# Patient Record
Sex: MALE | Race: WHITE
[De-identification: names, ages, dates, MRNs, and addresses within clinical notes are randomized per-mention and may not be internally consistent; named-entity substitution may affect disease eponyms.]

---
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
## 2023-01-13 ENCOUNTER — HOSPITAL ENCOUNTER (EMERGENCY)
Age: 18
Discharge: LEFT BEFORE DISCHARGE | End: 2023-01-13

## 2023-01-13 VITALS
SYSTOLIC BLOOD PRESSURE: 130 MMHG | RESPIRATION RATE: 16 BRPM | TEMPERATURE: 98.3 F | HEART RATE: 83 BPM | OXYGEN SATURATION: 94 % | DIASTOLIC BLOOD PRESSURE: 82 MMHG

## 2023-01-17 ENCOUNTER — HOSPITAL ENCOUNTER (EMERGENCY)
Age: 18
Discharge: LEFT BEFORE DISCHARGE | End: 2023-01-17
Payer: COMMERCIAL

## 2023-01-17 VITALS
RESPIRATION RATE: 16 BRPM | OXYGEN SATURATION: 98 % | HEIGHT: 68 IN | SYSTOLIC BLOOD PRESSURE: 124 MMHG | TEMPERATURE: 98.6 F | DIASTOLIC BLOOD PRESSURE: 80 MMHG | WEIGHT: 175 LBS | HEART RATE: 90 BPM | BODY MASS INDEX: 26.52 KG/M2

## 2023-01-17 DIAGNOSIS — Z53.21 ELOPED FROM EMERGENCY DEPARTMENT: Primary | ICD-10-CM

## 2023-01-17 PROCEDURE — 99281 EMR DPT VST MAYX REQ PHY/QHP: CPT

## 2023-01-17 ASSESSMENT — PAIN - FUNCTIONAL ASSESSMENT: PAIN_FUNCTIONAL_ASSESSMENT: NONE - DENIES PAIN

## 2023-01-17 NOTE — ED NOTES
Department of Emergency Medicine  FIRST PROVIDER ELOPEMENT NOTE                 [facility]          1/17/23  6:44 PM EST    MRN: [id number]    HPI: Cullen Julian is a 21 y.o. male who presents to the ED with the following complaint: Dizziness (Has not been eating (last intake 2 days ago))      (Please refer to [address] [place] [address] for pertinent ROS and PE documentation)  Labs:  No results found for this visit on 01/17/23. Imaging: All Radiology results interpreted by Radiologist unless otherwise noted. No orders to display     ED Course    Medications - No data to display     -------------------------  Disposition    Patient ELOPED from the department prior to completion of evaluation after triage. Results of triage orders that were completed at time of elopement as indicated above were reviewed.     Diagnosis at Time of Elopement: (Based on presenting complaint/available test results):   Eloped from the ED    Electronically signed by NAN Bran CNP   DD: 1/17/23      NAN Bran CNP  01/17/23 2443

## 2023-01-17 NOTE — ED PROVIDER NOTES
Department of Emergency Medicine  FIRST PROVIDER TRIAGE NOTE             Independent MLP           1/17/23  6:36 PM EST    Date of Encounter: 1/17/23   MRN: [id number]      HPI: Veronica Ferrara is a 21 y.o. male who presents to the ED for Dizziness (Has not been eating (last intake 2 days ago))   Patient reports he lives with his brother and took the bus here and does not have food in the home and has not eaten for 3 days and is dizzy and lightheaded. Patients grandmother and aunt brought him chicken nuggets, ff's and orange drink while sitting in triage and his finger stick blood sugar was 273 and states he is not diabetic and denies any excessive thirst, excessive urinating. He reports he was only drinking water and did not eat before the blood sugar was obtained. He denies any headache or head trauma. No neurologic or sensory deficits on examination and is no longer dizzy and smiling and eating out of a [employer] bag. ROS: Negative for cp, sob, abd pain, back pain, fever, cough, vomiting, diarrhea, urinary complaints, rash, headache, Suicidal ideation, Homicidal Ideation, bloody or tarry stools. PE: Gen Appearance/Constitutional: alert  HEENT: NC/NT. PERRLA,  Airway patent. Neck: supple  CV: regular rate  Pulm: CTA bilat  GI: soft and NT  Musculoskeletal: moves all extremities x 4  Lymphatics: no edema     Initial Plan of Care: All treatment areas with department are currently occupied. Plan to order/Initiate the following while awaiting opening in ED: labs and EKG to make sure the patient is not DM.   Initiate Treatment-Testing, Proceed toTreatment Area When Bed Available for ED Attending/MLP to Continue Care    Electronically signed by NAN Vogel CNP   DD: 1/17/23      NAN Vogel CNP  01/17/23 [address], APRN - CNP  01/17/23 2370

## 2023-01-22 ENCOUNTER — HOSPITAL ENCOUNTER (EMERGENCY)
Age: 18
Discharge: HOME | DRG: 753 | End: 2023-01-22
Attending: EMERGENCY MEDICINE
Payer: COMMERCIAL

## 2023-01-22 ENCOUNTER — APPOINTMENT (OUTPATIENT)
Dept: GENERAL RADIOLOGY | Age: 18
DRG: 753 | End: 2023-01-22
Payer: COMMERCIAL

## 2023-01-22 VITALS
RESPIRATION RATE: 16 BRPM | TEMPERATURE: 97.4 F | OXYGEN SATURATION: 98 % | DIASTOLIC BLOOD PRESSURE: 62 MMHG | RESPIRATION RATE: 16 BRPM | SYSTOLIC BLOOD PRESSURE: 125 MMHG | OXYGEN SATURATION: 98 % | DIASTOLIC BLOOD PRESSURE: 62 MMHG | SYSTOLIC BLOOD PRESSURE: 125 MMHG | TEMPERATURE: 97.4 F | HEART RATE: 98 BPM | HEART RATE: 98 BPM

## 2023-01-22 DIAGNOSIS — S93.401A SPRAIN OF RIGHT ANKLE, UNSPECIFIED LIGAMENT, INITIAL ENCOUNTER: Primary | ICD-10-CM

## 2023-01-22 PROCEDURE — 73610 X-RAY EXAM OF ANKLE: CPT

## 2023-01-22 PROCEDURE — 99283 EMERGENCY DEPT VISIT LOW MDM: CPT

## 2023-01-22 NOTE — ED PROVIDER NOTES
Shared CHRISTOPHER-ED Attending Visit. CC: [address]  Department of Emergency Medicine   ED  Encounter Note  Admit Date/RoomTime: 2023  3:07 PM  ED Room: [place] [address]    NAME: Misty Guzmán  : 2005  MRN: [id number]     Chief Complaint:  Ankle Pain (Slipped and fill on ice , complain of right ankle pain )    History of Present Illness        Misty Guzmán is a 21 y.o. old male presenting to the emergency department by private vehicle, for traumatic Right ankle pain which occured today, prior to arrival.  The complaint is due to slipping on ice. Since onset the symptoms have been remaining constant with ability to bear weight, but with some pain. Patient has no prior history of pain/injury with regards to today's visit. His pain is aggraveated by any use of and relieved by nothing, as no treatment has been provided prior to this visit. Patient stated that he slipped on ice. Patient states that he did not fall, just slipped and hurt his right ankle. ROS   Pertinent positives and negatives are stated within HPI, all other systems reviewed and are negative. Past Medical History:  has no past medical history on file. Surgical History:  has no past surgical history on file. Social History:      Family History: family history is not on file. Allergies: Patient has no known allergies. Physical Exam   Oxygen Saturation Interpretation: Normal.        ED Triage Vitals [23 1502]   BP Temp Temp src Heart Rate Resp SpO2 Height Weight   125/62 97.4 °F (36.3 °C) -- 98 16 98 % -- --       Physical Exam  Constitutional:  Alert, development consistent with age. Neck:  Normal ROM. Supple. Right Ankle: diffusely across ankle              Tenderness:  mild. Swelling: Mild. Deformity: no deformity observed/palpated. ROM: full range with pain. Skin:  no wounds, erythema, or swelling.        Neurovascular: Motor deficit: none. Sensory deficit:   none. Pulse deficit: none. Capillary refill: normal.  Right Knee:              Tenderness:  none. Swelling: none. Deformity: no deformity observed/palpated. ROM: full range of motion. Skin:  no wounds, erythema, or swelling. Right Foot: diffusely across entire foot              Tenderness:  none. Swelling: None. Deformity: no deformity observed/palpated. ROM: full range of motion. Skin:  no wounds, erythema, or swelling  Gait:  limp due to affected limb. Lymphatics: No lymphangitis or adenopathy noted. Neurological:  Oriented. Motor functions intact. Lab / Imaging Results   (All laboratory and radiology results have been personally reviewed by myself)  Labs:  No results found for this visit on 01/22/23. Imaging: All Radiology results interpreted by Radiologist unless otherwise noted. XR ANKLE RIGHT (MIN 3 VIEWS)   Final Result   Small well corticated bony fragment at the tip of the medial malleolus likely   a prior avulsion injury. No acute fractures. Soft tissue swelling laterally. ED Course / Medical Decision Making   Medications - No data to display     MDM:       Patient presents to the emergency department by private vehicle, for traumatic Right ankle pain which occured today, prior to arrival.  The complaint is due to slipping on ice. Since onset the symptoms have been remaining constant with ability to bear weight, but with some pain. Patient has no prior history of pain/injury with regards to today's visit. His pain is aggraveated by any use of and relieved by nothing, as no treatment has been provided prior to this visit. Patient stated that he slipped on ice. Patient states that he did not fall, just slipped and hurt his right ankle. Right ankle has diffuse tenderness noted with mild swelling. Full ROM with pain. No wounds, erythema, or deformity observed/palpated. Right foot is neurovascularly intact with no motor deficit, no sensory deficit, no pulse deficit, and normal capillary refill. All compartments soft and compressible. No proximal tibular tenderness to palpation. No tenderness, swelling or deformity observed/palpated to right knee. Patient ordered X-ray of right ankle that shows no acute fracture. Ace wrap applied to right ankle. Patient given number for [facility] so he can establish a PCP for follow up care. Patient verbalized understanding of discharge instructions and has no questions at this time. Plan of Care/Counseling:  NAN Hudson CNP reviewed today's visit with the patient in addition to providing specific details for the plan of care and counseling regarding the diagnosis and prognosis. Questions are answered at this time and are agreeable with the plan. Assessment      1. Sprain of right ankle, unspecified ligament, initial encounter      Plan   Discharged home. Patient condition is stable    New Medications     There are no discharge medications for this patient. Electronically signed by NAN Hudson CNP   DD: 1/22/23  **This report was transcribed using voice recognition software. Every effort was made to ensure accuracy; however, inadvertent computerized transcription errors may be present. END OF ED PROVIDER NOTE       NAN Hudson CNP  01/22/23 9189    ATTENDING PROVIDER ATTESTATION:     Calin Barba presented to the emergency department for evaluation of Ankle Pain (Slipped and fill on ice , complain of right ankle pain )    I have reviewed and discussed the case, including pertinent history (medical, surgical, family and social) and exam findings with the Midlevel and the Nurse assigned to Calin Barba.   I have personally performed and/or participated in a substantive portion of the history, exam, medical decision making, and procedures and agree with all pertinent clinical information. I personally saw the patient and performed a substantive portion of the visit including all aspects of the medical decision making    I, Dr. Maddy Valentin am the primary physician of record. Celestina Sneed is a 21 y.o. male who presents to the ED for ankle pain  Vital signs upon arrival /62   Pulse 98   Temp 97.4 °F (36.3 °C)   Resp 16   SpO2 98%     History From: The patient as well as the patient's medical record. Independent historian: None    Limitations to history: None    History: Patient is presenting to the emergency department with a chief complaint of right ankle pain. The patient states that he slipped on ice. He has had pain located diffusely in the right ankle. The patient's pain is moderate in severity. Worse with bearing weight. Nothing makes it better. No treatment prior to arrival.  He did not strike his head. He denies any numbness, weakness or paresthesias. Physical exam: Patient is sitting in the bed in no acute distress. The patient has no fibular head tenderness. He does have minimal base of fifth metatarsal tenderness. 2+ dorsalis pedis and posterior tibial pulse present. There is tenderness to palpation in the medial as well as lateral malleolus. All compartments of the right lower extremity are soft. Differential diagnosis includes but not limited to:  Fracture-not seen on imaging  Dislocation-not seen on imaging  Sprain-we will treat as sprain will give Ace wrap and crutches. Diagnostic Considerations : Consider CT of the ankle for further evaluation for occult fracture. At this time not necessary as patient's pain is well controlled. To consider x-ray of the foot as patient did have mild tenderness to palpation over the base of the fifth metatarsal.  Patient adamantly declined foot x-ray.     Patient treated with   Ace wrap      Imaging reviewed and interpreted by me demonstrates:  No acute fracture or dislocation. Discussions with other health care professionals:   Spoke with Nevaeh Flower NP who did see the patient as well. The patient will be discharged home with Ace wrap and crutches. Risk/Disposition: Mild to moderate-patient presents emergency department after ankle pain. The patient did have pain in his ankle. The patient had no fracture dislocation seen. He will be treated with Ace wrap and crutches. Pt will be d/c and will follow up with his PCP . He is educated on signs and symptoms that require emergent evaluation. Pt is advised to return to the ED if his symptoms change or worsen. If his pain persists, pt may need further evaluation. Pt is agreeable to plan and all questions have been answered at this time. PATIENT REFERRED TO:  [facility]  [phone]  Schedule an appointment as soon as possible for a visit   to schedule a follow-up appt. for re-evaluation      DISCHARGE MEDICATIONS:  There are no discharge medications for this patient. I have reviewed my findings and recommendations with Valeria Washington and members of family present at the time of disposition. My findings/plan: The encounter diagnosis was Sprain of right ankle, unspecified ligament, initial encounter. There are no discharge medications for this patient.     Meena Sr, [address],   01/24/23 0922

## 2023-01-22 NOTE — ED NOTES
Ace wrap applied to right ankle and crutches given.      [facility]  01/22/23 3101

## 2023-01-23 ENCOUNTER — HOSPITAL ENCOUNTER (INPATIENT)
Age: 18
LOS: 3 days | Discharge: HOME | DRG: 753 | End: 2023-01-27
Attending: EMERGENCY MEDICINE | Admitting: PSYCHIATRY & NEUROLOGY
Payer: COMMERCIAL

## 2023-01-23 DIAGNOSIS — F39 MOOD DISORDER (HCC): Primary | ICD-10-CM

## 2023-01-23 LAB
ACETAMINOPHEN LEVEL: <5 MCG/ML (ref 10–30)
ALBUMIN SERPL-MCNC: 4.2 G/DL (ref 3.5–5.2)
ALP BLD-CCNC: 84 U/L (ref 40–129)
ALT SERPL-CCNC: 11 U/L (ref 0–40)
AMPHETAMINE SCREEN, URINE: NOT DETECTED
ANION GAP SERPL CALCULATED.3IONS-SCNC: 10 MMOL/L (ref 7–16)
AST SERPL-CCNC: 16 U/L (ref 0–39)
BARBITURATE SCREEN URINE: NOT DETECTED
BASOPHILS ABSOLUTE: 0.05 E9/L (ref 0–0.2)
BASOPHILS RELATIVE PERCENT: 0.5 % (ref 0–2)
BENZODIAZEPINE SCREEN, URINE: NOT DETECTED
BILIRUB SERPL-MCNC: 0.3 MG/DL (ref 0–1.2)
BUN BLDV-MCNC: 12 MG/DL (ref 6–20)
CALCIUM SERPL-MCNC: 9.6 MG/DL (ref 8.6–10.2)
CANNABINOID SCREEN URINE: NOT DETECTED
CHLORIDE BLD-SCNC: 102 MMOL/L (ref 98–107)
CO2: 28 MMOL/L (ref 22–29)
COCAINE METABOLITE SCREEN URINE: NOT DETECTED
CREAT SERPL-MCNC: 0.9 MG/DL (ref 0.7–1.2)
EOSINOPHILS ABSOLUTE: 0.45 E9/L (ref 0.05–0.5)
EOSINOPHILS RELATIVE PERCENT: 4.9 % (ref 0–6)
ETHANOL: <10 MG/DL (ref 0–0.08)
FENTANYL SCREEN, URINE: NOT DETECTED
GFR SERPL CREATININE-BSD FRML MDRD: >60 ML/MIN/1.73
GLUCOSE BLD-MCNC: 115 MG/DL (ref 74–99)
HCT VFR BLD CALC: 47.4 % (ref 37–54)
HEMOGLOBIN: 16.3 G/DL (ref 12.5–16.5)
IMMATURE GRANULOCYTES #: 0.04 E9/L
IMMATURE GRANULOCYTES %: 0.4 % (ref 0–5)
LYMPHOCYTES ABSOLUTE: 2.98 E9/L (ref 1.5–4)
LYMPHOCYTES RELATIVE PERCENT: 32.5 % (ref 20–42)
Lab: NORMAL
MCH RBC QN AUTO: 29 PG (ref 26–35)
MCHC RBC AUTO-ENTMCNC: 34.4 % (ref 32–34.5)
MCV RBC AUTO: 84.3 FL (ref 80–99.9)
METHADONE SCREEN, URINE: NOT DETECTED
MONOCYTES ABSOLUTE: 0.61 E9/L (ref 0.1–0.95)
MONOCYTES RELATIVE PERCENT: 6.7 % (ref 2–12)
NEUTROPHILS ABSOLUTE: 5.03 E9/L (ref 1.8–7.3)
NEUTROPHILS RELATIVE PERCENT: 55 % (ref 43–80)
OPIATE SCREEN URINE: NOT DETECTED
OXYCODONE URINE: NOT DETECTED
PDW BLD-RTO: 12.4 FL (ref 11.5–15)
PHENCYCLIDINE SCREEN URINE: NOT DETECTED
PLATELET # BLD: 288 E9/L (ref 130–450)
PMV BLD AUTO: 8.9 FL (ref 7–12)
POTASSIUM SERPL-SCNC: 3.6 MMOL/L (ref 3.5–5)
RBC # BLD: 5.62 E12/L (ref 3.8–5.8)
SALICYLATE, SERUM: <0.3 MG/DL (ref 0–30)
SODIUM BLD-SCNC: 140 MMOL/L (ref 132–146)
TOTAL PROTEIN: 7.1 G/DL (ref 6.4–8.3)
TRICYCLIC ANTIDEPRESSANTS SCREEN SERUM: NEGATIVE NG/ML
VALPROIC ACID LEVEL: 3 MCG/ML (ref 50–100)
WBC # BLD: 9.2 E9/L (ref 4.5–11.5)

## 2023-01-23 PROCEDURE — 80307 DRUG TEST PRSMV CHEM ANLYZR: CPT

## 2023-01-23 PROCEDURE — 80053 COMPREHEN METABOLIC PANEL: CPT

## 2023-01-23 PROCEDURE — 85025 COMPLETE CBC W/AUTO DIFF WBC: CPT

## 2023-01-23 PROCEDURE — 80164 ASSAY DIPROPYLACETIC ACD TOT: CPT

## 2023-01-23 PROCEDURE — 80143 DRUG ASSAY ACETAMINOPHEN: CPT

## 2023-01-23 PROCEDURE — 82077 ASSAY SPEC XCP UR&BREATH IA: CPT

## 2023-01-23 PROCEDURE — 99285 EMERGENCY DEPT VISIT HI MDM: CPT

## 2023-01-23 PROCEDURE — 80179 DRUG ASSAY SALICYLATE: CPT

## 2023-01-23 ASSESSMENT — PAIN - FUNCTIONAL ASSESSMENT: PAIN_FUNCTIONAL_ASSESSMENT: NONE - DENIES PAIN

## 2023-01-23 NOTE — LETTER
[address] [id number]  Phone: [phone]          January 27, 2023     Patient: Orlando Zurita   YOB: 2005   Date of Visit: 1/23/2023       To Whom It May Concern:    Please be advised that Orlando Zurita was admitted to [address] [place] [address] in [facility] on 1/23/2023 and discharged on 1/27/2023. If you have any questions or concerns, please don't hesitate to call. Sincerely,    Chantelle Greer MSW, LSW.

## 2023-01-24 PROBLEM — F31.60 BIPOLAR AFFECTIVE, MIXED (HCC): Status: RESOLVED | Noted: 2022-08-20 | Resolved: 2023-01-24

## 2023-01-24 PROBLEM — F31.60 BIPOLAR 1 DISORDER, MIXED (HCC): Status: ACTIVE | Noted: 2023-01-24

## 2023-01-24 LAB
EKG ATRIAL RATE: 57 BPM
EKG P AXIS: 39 DEGREES
EKG P-R INTERVAL: 130 MS
EKG Q-T INTERVAL: 394 MS
EKG QRS DURATION: 82 MS
EKG QTC CALCULATION (BAZETT): 383 MS
EKG R AXIS: 52 DEGREES
EKG T AXIS: 52 DEGREES
EKG VENTRICULAR RATE: 57 BPM
INFLUENZA A: NOT DETECTED
INFLUENZA B: NOT DETECTED
SARS-COV-2 RNA, RT PCR: NOT DETECTED

## 2023-01-24 PROCEDURE — 6370000000 HC RX 637 (ALT 250 FOR IP): Performed by: PSYCHIATRY & NEUROLOGY

## 2023-01-24 PROCEDURE — 93010 ELECTROCARDIOGRAM REPORT: CPT | Performed by: INTERNAL MEDICINE

## 2023-01-24 PROCEDURE — 87636 SARSCOV2 & INF A&B AMP PRB: CPT

## 2023-01-24 PROCEDURE — 93005 ELECTROCARDIOGRAM TRACING: CPT | Performed by: EMERGENCY MEDICINE

## 2023-01-24 PROCEDURE — 6370000000 HC RX 637 (ALT 250 FOR IP): Performed by: NURSE PRACTITIONER

## 2023-01-24 PROCEDURE — 90792 PSYCH DIAG EVAL W/MED SRVCS: CPT | Performed by: NURSE PRACTITIONER

## 2023-01-24 PROCEDURE — 1240000000 HC EMOTIONAL WELLNESS R&B

## 2023-01-24 RX ORDER — MAGNESIUM HYDROXIDE/ALUMINUM HYDROXICE/SIMETHICONE 120; 1200; 1200 MG/30ML; MG/30ML; MG/30ML
30 SUSPENSION ORAL PRN
Status: DISCONTINUED | OUTPATIENT
Start: 2023-01-24 | End: 2023-01-27 | Stop reason: HOSPADM

## 2023-01-24 RX ORDER — LANOLIN ALCOHOL/MO/W.PET/CERES
3 CREAM (GRAM) TOPICAL NIGHTLY
Status: DISCONTINUED | OUTPATIENT
Start: 2023-01-24 | End: 2023-01-27 | Stop reason: HOSPADM

## 2023-01-24 RX ORDER — NICOTINE 21 MG/24HR
1 PATCH, TRANSDERMAL 24 HOURS TRANSDERMAL DAILY
Status: DISCONTINUED | OUTPATIENT
Start: 2023-01-24 | End: 2023-01-24

## 2023-01-24 RX ORDER — ACETAMINOPHEN 325 MG/1
650 TABLET ORAL EVERY 6 HOURS PRN
Status: DISCONTINUED | OUTPATIENT
Start: 2023-01-24 | End: 2023-01-27 | Stop reason: HOSPADM

## 2023-01-24 RX ORDER — HALOPERIDOL 5 MG/1
5 TABLET ORAL EVERY 6 HOURS PRN
Status: DISCONTINUED | OUTPATIENT
Start: 2023-01-24 | End: 2023-01-27 | Stop reason: HOSPADM

## 2023-01-24 RX ORDER — DIVALPROEX SODIUM 500 MG/1
1000 TABLET, EXTENDED RELEASE ORAL DAILY
Status: DISCONTINUED | OUTPATIENT
Start: 2023-01-24 | End: 2023-01-27 | Stop reason: HOSPADM

## 2023-01-24 RX ORDER — DIVALPROEX SODIUM 500 MG/1
1000 TABLET, EXTENDED RELEASE ORAL DAILY
COMMUNITY
End: 2023-03-09

## 2023-01-24 RX ORDER — HYDROXYZINE PAMOATE 25 MG/1
50 CAPSULE ORAL 3 TIMES DAILY PRN
Status: DISCONTINUED | OUTPATIENT
Start: 2023-01-24 | End: 2023-01-27 | Stop reason: HOSPADM

## 2023-01-24 RX ORDER — HALOPERIDOL 5 MG/ML
5 INJECTION INTRAMUSCULAR EVERY 6 HOURS PRN
Status: DISCONTINUED | OUTPATIENT
Start: 2023-01-24 | End: 2023-01-27 | Stop reason: HOSPADM

## 2023-01-24 RX ADMIN — DIVALPROEX SODIUM 1000 MG: 500 TABLET, EXTENDED RELEASE ORAL at 15:42

## 2023-01-24 RX ADMIN — NICOTINE POLACRILEX 4 MG: 2 GUM, CHEWING BUCCAL at 15:43

## 2023-01-24 RX ADMIN — MELATONIN 3 MG ORAL TABLET 3 MG: 3 TABLET ORAL at 21:06

## 2023-01-24 ASSESSMENT — PAIN SCALES - GENERAL: PAINLEVEL_OUTOF10: 0

## 2023-01-24 ASSESSMENT — LIFESTYLE VARIABLES
HOW MANY STANDARD DRINKS CONTAINING ALCOHOL DO YOU HAVE ON A TYPICAL DAY: PATIENT DOES NOT DRINK
HOW OFTEN DO YOU HAVE A DRINK CONTAINING ALCOHOL: NEVER
HOW MANY STANDARD DRINKS CONTAINING ALCOHOL DO YOU HAVE ON A TYPICAL DAY: PATIENT DOES NOT DRINK

## 2023-01-24 ASSESSMENT — SLEEP AND FATIGUE QUESTIONNAIRES
DO YOU HAVE DIFFICULTY SLEEPING: YES
AVERAGE NUMBER OF SLEEP HOURS: 8
AVERAGE NUMBER OF SLEEP HOURS: 5
DO YOU HAVE DIFFICULTY SLEEPING: NO
DO YOU USE A SLEEP AID: NO
SLEEP PATTERN: DISTURBED/INTERRUPTED SLEEP
SLEEP PATTERN: NORMAL
DO YOU USE A SLEEP AID: NO

## 2023-01-24 NOTE — ED NOTES
states this patient will need to be presented to psychiatrist for admission. Reviewed chart and noted no EKG completed. EKG is required for admission.       Bella Boone RN  01/24/23 4775

## 2023-01-24 NOTE — ED NOTES
Pt resting comfortably on cot with eyes closed. Chest rise even and RR non labored. CO remains at bedside. NAD noted. Bed locked and in lowest position with side rails up x2 for patient safety. Will continue to monitor.       Suma Diaz RN  01/24/23 9191

## 2023-01-24 NOTE — PROGRESS NOTES
Department of Psychiatry  History and Physical - Adult     CHIEF COMPLAINT:      Patient was seen after discussing with the treatment team and reviewing the chart    CIRCUMSTANCES OF ADMISSION:     HISTORY OF PRESENT ILLNESS:      The patient is a 21 y.o. male with significant past history of autistic disorder bipolar disorder was treated for ADHD in the past microcephaly presented to the ED after patient presented to [facility] for his ankle and was pink slipped after patient was heard making threats to his uncle apparently threatening to go get his Glock 39 and going to his parents house in [place] to kill them and robbed them arrival to the ED patient denied that he ever threatened to kill anyone. He states he told his uncle he was looking to beat his \"asked. \"  When he sees him but not kill him or his parents. He states that he is not sure why [place] children was saying this. He stated that he has been feeling more depressed lately because he lost a close friend in a house fire  in [facility]. In the ED his urine drug screen negative his blood alcohol level is negative he was medically cleared admitted to  S adult psychiatric unit for further psychiatric assessment stabilization and treatment. Upon evaluation today patient states that he never made any threats to kill anybody. He states he only went to [facility] because of his ankle. He vehemently denies any suicidal homicidal ideations intent or plan he denies any auditory or visual hallucinations. He states that he has been taking his medication has been getting his monthly injection every month and states that he is due for it in a few days. He states he follows outpatient [facility]to where he receives his injection. He does not appear to be internally stimulated or internally preoccupied and he denies any auditory or visual hallucinations.   His affect is bright and pleasant he is smiling he denies any suicidal homicidal ideations intent or plan denies any auditory visual hallucinations he is alert oriented time place and person                      Past psychiatric history  History of ADHD bipolar disorder autism and last admission in with multiple inpatient psychiatric hospitalizations last year at [facility] [address] in June 2022 he is also been at [facility] on 5/26/2020. He has a history of being a be noncompliant with medications. Evan Larsen He reports a past history of suicide attempts by walking into traffic     Substance abuse history  Patient states he was drunk when he made the suicidal statements blood alcohol is negative his urine drug is positive for cannabis     legal history  Patient is currently on probation [place] in [facility]      personal family and social history  Single never  has no children lives with her grandmother. Grandmother was POA until about age 23. His parents are both in intermediate for 18 years due to the same crime but he does not want to say what is it. He has 1 sister. Denies any major mental illness in the family. Denies physical sexual or emotional abuse while growing up. States he is currently living with his brother. Past Medical History:        Diagnosis Date    ADHD (attention deficit hyperactivity disorder)     Autism disorder     GERD (gastroesophageal reflux disease)     Hypotonia     Microcephalic (HCC)        Medications Prior to Admission:   Medications Prior to Admission: divalproex (DEPAKOTE ER) 500 MG extended release tablet, Take 1,000 mg by mouth daily  risperiDONE ER (PERSERIS) 120 MG PRSY subcutaneous injection, Inject 120 mg into the skin every 30 days Last dose was 12/28/22, due on 1/26/23  [DISCONTINUED] divalproex (DEPAKOTE) 250 MG DR tablet, Take 3 tablets by mouth 2 times daily    Past Surgical History:    History reviewed. No pertinent surgical history. Allergies:   Patient has no known allergies. Family History  History reviewed.  No pertinent family history.          EXAMINATION:    REVIEW OF SYSTEMS:    ROS:  [x] All negative/unchanged except if checked. Explain positive(checked items) below:  [] Constitutional  [] Eyes  [] Ear/Nose/Mouth/Throat  [] Respiratory  [] CV  [] GI  []   [] Musculoskeletal  [] Skin/Breast  [] Neurological  [] Endocrine  [] Heme/Lymph  [] Allergic/Immunologic    Explanation:     Vitals:  /71   Pulse 74   Temp 97.1 °F (36.2 °C) (Temporal)   Resp 16   Ht 5' 8\" (1.727 m)   Wt 175 lb (79.4 kg)   SpO2 98%   BMI 26.61 kg/m²      Physical Examination:   Head: x  Atraumatic: x normocephalic  Skin and Mucosa        Moist x  Dry   Pale  x Normal   Neck:  Thyroid  Palpable   x  Not palpable   venus distention   adenopathy   Chest: x Clear   Rhonchi     Wheezing   CV:  xS1   xS2    xNo murmer   Abdomen:  x  Soft    Tender    Viceromegaly   Extremities:  x No Edema     Edema     Cranial Nerves Examination:   CN II:   xPupils are reactive to light  Pupils are non reactive to light  CN III, IV, VI:  xNo eye deviation    No diplopia or ptosis   CN V:    xFacial Sensation is intact     Facial Sensation is not intact   CN IIIV:   x Hearing is normal to rubbing fingers   CN IX, X:     xNormal gag reflex and phonation   CN XI:   xShoulder shrug and neck rotation is normal  CNXII:    xTongue is midline no deviation or atrophy    Mental Status Examination:    Level of consciousness:  within normal limits   Appearance:  fair grooming and fair hygiene  Behavior/Motor:  no abnormalities noted  Attitude toward examiner:  cooperative  Speech:  spontaneous, normal rate and normal volume   Mood: \" My mood is good.\"  Affect: Bright and pleasant almost elated  Thought processes: Linear thought flight of ideas loose associations  Thought content: Devoid of any auditory visual hallucinations delusions or other perceptual normalities.  Denies SI/HI intent or plan   Language: able to name objects and repeate phrases  Remote Memory: intact  Recent Memory:  intact  Cognition:  oriented to person, place, and time   Fund of Knowledge: Vocabulary intact, pt is aware of current events and past history  Attetion and Concentration intact  Insight fair   Judgement fair       DIAGNOSIS:  Bipolar 1 mixed  Autism spectrum disorder          LABS: REVIEWED TODAY:  Recent Labs     01/23/23 2050   WBC 9.2   HGB 16.3        Recent Labs     01/23/23 2050      K 3.6      CO2 28   BUN 12   CREATININE 0.9   GLUCOSE 115*     Recent Labs     01/23/23 2050   BILITOT 0.3   ALKPHOS 84   AST 16   ALT 11     Lab Results   Component Value Date/Time    LABAMPH NOT DETECTED 01/23/2023 09:30 PM    BARBSCNU NOT DETECTED 01/23/2023 09:30 PM    LABBENZ NOT DETECTED 01/23/2023 09:30 PM    LABMETH NOT DETECTED 01/23/2023 09:30 PM    OPIATESCREENURINE NOT DETECTED 01/23/2023 09:30 PM    PHENCYCLIDINESCREENURINE NOT DETECTED 01/23/2023 09:30 PM    ETOH <10 01/23/2023 08:50 PM     Lab Results   Component Value Date/Time    TSH 2.210 05/27/2022 10:18 PM     No results found for: LITHIUM  Lab Results   Component Value Date    VALPROATE 3 (L) 01/23/2023     Lab Results   Component Value Date/Time    VALPROATE 3 01/23/2023 08:50 PM         Radiology XR CHEST PORTABLE    Result Date: 12/28/2022  EXAMINATION: ONE XRAY VIEW OF THE CHEST 12/28/2022 1:15 am COMPARISON: 12/15/2022 HISTORY: ORDERING SYSTEM PROVIDED HISTORY: cough, congestion TECHNOLOGIST PROVIDED HISTORY: Reason for exam:->cough, congestion FINDINGS: The lungs are without acute focal process. There is no effusion or pneumothorax. The cardiomediastinal silhouette is without acute process. The osseous structures are without acute process. No acute process. TREATMENT PLAN:    Risk Management: Based on the diagnosis and assessment biopsychosocial treatment model was presented to the patient and was given the opportunity to ask any question.   The patient was agreeable to the plan and all the patient's questions were answered to the patient's satisfaction. I discussed with the patient the risk, benefit, alternative and common side effects for the proposed medication treatment. The patient is consenting to this treatment. Collateral Information:  Will obtain collateral information from the family or friends. Will obtain medical records as appropriate from out patient providers  Will consult the hospitalist for a physical exam to rule out any co-morbid physical condition. Home medication Reconciled       New Medications started during this admission :        Prn Haldol 5mg and Vistaril 50mg q6hr for extreme agitation. Trazodone as ordered for insomnia  Vistaril as ordered for anxiety      Psychotherapy:   Encourage participation in milieu and group therapy  Individual therapy as needed        Patient's diagnosis, treatment plan, medication management was formulated at the end of evaluation and after reviewing relevant documentation. Patient was seen directly by myself and Dr. Dallin Escudero    Continue Depakote ER 1000 mg daily  Continue Perseris 120 mg subcu every 30 days due on 1/26/2023    Can discontinue constant observer.   Patient is deemed to be moderate risk for suicide based on productive risk factors as well as risk mitigation    Risk Factors: mental health diagnosis, recent loss, limited family/friend support, several inpatient psychiatric admissions, off prescribed psych meds, currently on probation      Protective Factors: support from brother, outpatient provider, help-seeking behavior, steady income-[facility]       Behavioral Services  Medicare Certification Upon Admission    I certify that this patient's inpatient psychiatric hospital admission is medically necessary for:    [x] (1) Treatment which could reasonably be expected to improve this patient's condition,       [ ] (2) Or for diagnostic study;     AND     [x](2) The inpatient psychiatric services are provided while the individual is under the care of a physician and are included in the individualized plan of care.     Estimated length of stay/service 3 to 7 days based on stability    Plan for post-hospital care outpatient psychiatric and counseling services      Electronically signed by NAN Chicas CNP on 6/69/3194 at 3:35 PM

## 2023-01-24 NOTE — ED NOTES
Belongings bag is in [address] which include pt's cell phone , birth certificate, clothes and shoes. Pt has a garcia hoodie with him that holds sentimental value.       Lana Ceballos RN  01/23/23 8436
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
## 2023-01-24 NOTE — GROUP NOTE
Group Therapy Note    Date: 1/24/2023    Group Start Time: 4301  Group End Time: 1110  Group Topic: Cognitive Skills    [facility] 7SE ACUTE BH 1    Thankful CHELSEY Todd, MURRAY        Group Therapy Note    Attendees: 20       Patient's Goal:  Pt attended community support group. We discussed unit rules and expectations. We also discussed trauma, how it impacts us and how to heal from it. Notes:  Pt attended group and was able to identify their daily goal as, \"I don't know yet. \"    Status After Intervention:  Unchanged    Participation Level: Active Listener    Participation Quality: Attentive and Sharing      Speech:  normal      Thought Process/Content: Logical      Affective Functioning: Incongruent      Mood: euthymic      Level of consciousness:  Attentive      Response to Learning: Able to verbalize current knowledge/experience      Endings: None Reported    Modes of Intervention: Education, Support, Socialization, Exploration, Clarifying, and Problem-solving      Discipline Responsible: /Counselor      Signature:   CHELSEY Gandhi, MURRAY

## 2023-01-24 NOTE — CARE COORDINATION
Navigator is following patient case in the community for linkage to additional services and possible community plan development. Navigator last correspondence with patient re-appointed  Angel Velasco at [facility] was on 12/20/22. Navigator had discussed patient case in the [address] monthly team meeting regarding exploration of DD services. [facility] expressed interest in re-evaluating patient for DD services if he were to sign an NINA. Patient had an appointment scheduled with [facility] 12/22/22 where they were going to have him sign a NINA. Unclear if this was obtained, if not Navigator will meet with patient to have him sign a NINA for [facility]. Navigator will follow up with patient [facility] , if he consents to sign a NINA for this admission.  Updated treatment team.    Electronically signed by CHELSEY Allen, MURRAY on 1/24/2023 at 10:42 AM

## 2023-01-24 NOTE — ED NOTES
Assigned [id number] to [facility] in admitting    [id number]     Angel Peters, W  01/24/23 [facility] 346, W  01/24/23 1021
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
## 2023-01-24 NOTE — ED NOTES
Call placed and message left requesting a return phone call to present the patient for admission.       Alexandra Vieira RN  01/24/23 2234

## 2023-01-24 NOTE — ED NOTES
Discussed case with Dr. Tony Tadeo / Eloise Bailey NP Patient to be accepted to [address] [place] [address] diagnosis of Bipolar affective mixed. Please ensure original pink slip / voluntary admission accompanies patient's chart. Addison Holloway to inform primary care RN of further admission details once bed is available.        Rolan Vital, BETINA  01/24/23 5130

## 2023-01-25 PROCEDURE — 99232 SBSQ HOSP IP/OBS MODERATE 35: CPT | Performed by: NURSE PRACTITIONER

## 2023-01-25 PROCEDURE — 1240000000 HC EMOTIONAL WELLNESS R&B

## 2023-01-25 PROCEDURE — 6370000000 HC RX 637 (ALT 250 FOR IP): Performed by: NURSE PRACTITIONER

## 2023-01-25 PROCEDURE — 6370000000 HC RX 637 (ALT 250 FOR IP): Performed by: PSYCHIATRY & NEUROLOGY

## 2023-01-25 RX ADMIN — NICOTINE POLACRILEX 4 MG: 2 GUM, CHEWING BUCCAL at 16:51

## 2023-01-25 RX ADMIN — HALOPERIDOL 5 MG: 5 TABLET ORAL at 19:10

## 2023-01-25 RX ADMIN — DIVALPROEX SODIUM 1000 MG: 500 TABLET, EXTENDED RELEASE ORAL at 08:44

## 2023-01-25 RX ADMIN — HYDROXYZINE PAMOATE 50 MG: 50 CAPSULE ORAL at 10:57

## 2023-01-25 RX ADMIN — NICOTINE POLACRILEX 4 MG: 2 GUM, CHEWING BUCCAL at 07:04

## 2023-01-25 NOTE — DISCHARGE INSTRUCTIONS
Follow up for Tobacco Cessation at:    [facility] Tobacco Treatment                                 Date:  Friday 2/3 at [address]  [place],  [address]   ([address]    take B elevators to 7th floor)   Phone: [phone]   Fax: [phone]

## 2023-01-25 NOTE — BH NOTE
[facility]  Initial Interdisciplinary Treatment Plan NOTE    Review Date & Time: 1/25 0915    Patient was in treatment team    Admission Type:   Admission Type:  Involuntary    Reason for admission:  Reason for Admission: \"I DIDN'T DO ANYTHING WRONG\"      Estimated Length of Stay Update:  5-7  Estimated Discharge Date Update: 2/2/23    EDUCATION:   Learner Progress Toward Treatment Goals: Reviewed results and recommendations of this team, Reviewed group plan and strategies, Reviewed signs, symptoms and risk of self harm and violent behavior, and Reviewed goals and plan of care    Method: Small group    Outcome: Verbalized understanding and Needs reinforcement    PATIENT GOALS: go home    PLAN/TREATMENT RECOMMENDATIONS UPDATE:encourage medications and group    GOALS UPDATE:   Time frame for Short-Term Goals: 1-3    Fay Norman RN

## 2023-01-25 NOTE — PLAN OF CARE
Problem: Shilpa  Goal: Will exhibit normal sleep and speech and no impulsivity  Description: INTERVENTIONS:  1. Administer medication as ordered  2. Set limits on impulsive behavior  3. Make attempts to decrease external stimuli as possible  1/25/2023 1654 by Margarette Landrum RN  Outcome: Progressing     Problem: Behavior  Goal: Pt/Family maintain appropriate behavior and adhere to behavioral management agreement, if implemented  Description: INTERVENTIONS:  1. Assess patient/family's coping skills and  non-compliant behavior (including use of illegal substances)  2. Notify security of behavior or suspected illegal substances which indicate the need for search of the family and/or belongings  3. Encourage verbalization of thoughts and concerns in a socially appropriate manner  4. Utilize positive, consistent limit setting strategies supporting safety of patient, staff and others  5. Encourage participation in the decision making process about the behavioral management agreement  6. If a visitor's behavior poses a threat to safety call refer to organization policy. 7. Initiate consult with , Psychosocial CNS, Spiritual Care as appropriate  1/25/2023 1654 by Margarette Landrum RN  Outcome: Progressing     Problem: Anxiety  Goal: Will report anxiety at manageable levels  Description: INTERVENTIONS:  1. Administer medication as ordered  2. Teach and rehearse alternative coping skills  3. Provide emotional support with 1:1 interaction with staff  1/25/2023 1654 by Margarette Landrum RN  Outcome: Progressing   Pt is out on the unit and is social with peers. He is pleasant on approach and states that he feels good. He denies any harmful ideations and hallucinations.

## 2023-01-25 NOTE — CARE COORDINATION
Shanita contacted [facility]  to schedule outpatient appointments for pt. They report that pt is scheduled for an injection 1/26. This injection is already noted in pt scheduled medications. Pt has appointment 2/2 at 11:30am for medication management in office.

## 2023-01-25 NOTE — GROUP NOTE
Group Therapy Note    Date: 1/25/2023    Group Start Time: 1000  Group End Time: 1100  Group Topic: Cognitive Skills    [facility] ACUTE BH 1    Thankful CHELSEY Todd LSW        Group Therapy Note    Attendees: 15       Patient's Goal:  Pt attended community support group. We discussed rules and expectations of the unit. We also learned about dissociation and practiced grounding techniques. Notes:  Pt was an active participant in group therapy. They were able to identify their daily goal as, \"be good and think about my son. \"    Status After Intervention:  Unchanged    Participation Level: Active Listener and Interactive    Participation Quality: Attentive, Sharing, and Intrusive      Speech:  normal      Thought Process/Content: Flight of ideas      Affective Functioning: Congruent      Mood: euthymic      Level of consciousness:  Attentive      Response to Learning: Able to verbalize current knowledge/experience and Able to retain information      Endings: None Reported    Modes of Intervention: Education, Support, Socialization, Exploration, Clarifying, and Problem-solving      Discipline Responsible: /Counselor      Signature:   CHELSEY Ceron LSW

## 2023-01-25 NOTE — CARE COORDINATION
Pt approached [facility] stating that he would like to speak with the dr. Today. Pt stated that he hurt his right ankle a few days ago and it has been hurting him and he is having a hard time walking. Pt stated that he would like to speak with the doctor to discuss his ankle pain and have it looked at.  SW informed NP.

## 2023-01-25 NOTE — CARE COORDINATION
Pt approached sw and told sw that he is going to be discharging soon. He reports that he would like referred to the crisis unit. Sw discussed with pt that he needs to stay at the crisis unit. Pt admits that he never stays at the crisis unit after he is accepted there. Sw discussed other discharge options with pt since he does not stay at the crisis unit. Pt states that he would like to discharge home with his brother. He states that he does not have his brother's contact information, but that the address is [place]. Pt states that he would like to take a bus to get there. Sw will continue to assist pt with discharge planning.

## 2023-01-25 NOTE — PROGRESS NOTES
BEHAVIORAL HEALTH FOLLOW-UP NOTE     1/25/2023     Patient was seen and examined in person, Chart reviewed   Patient's case discussed with staff/team    Chief Complaint: \"I am doing good I am not can hurt anybody. \"    Interim History: Patient seen up on the unit today. He vehemently denies suicidal homicidal ideations intent or plan denies any auditory visual hallucinations. He states that he is not can hurt anybody when he leaves the hospital.  He states he \"does not need to be here. \"  States everything is okay and that [address] children just misinterpreted what he was saying. He is out visible in the milieu attending groups social with peers there are no overt or covert signs psychosis eating well sleeping well no neurovegetative signs of depression      Appetite: [x] Normal/Unchanged  [] Increased  [] Decreased      Sleep:       [x] Normal/Unchanged  [] Fair       [] Poor              Energy:    [x] Normal/Unchanged  [] Increased  [] Decreased        SI [] Present  [x] Absent    HI  []Present  [x] Absent     Aggression:  [] yes  [x] no    Patient is [x] able  [] unable to CONTRACT FOR SAFETY     PAST MEDICAL/PSYCHIATRIC HISTORY:   Past Medical History:   Diagnosis Date    ADHD (attention deficit hyperactivity disorder)     Autism disorder     GERD (gastroesophageal reflux disease)     Hypotonia     Intermittent explosive disorder     Microcephalic ([address]     Schizoaffective disorder ([address]        FAMILY/SOCIAL HISTORY:  History reviewed. No pertinent family history.   Social History     Socioeconomic History    Marital status: Single     Spouse name: Not on file    Number of children: 2    Years of education: 11    Highest education level: Not on file   Occupational History    Not on file   Tobacco Use    Smoking status: Every Day     Packs/day: 1.00     Years: 6.00     Pack years: 6.00     Types: Cigarettes    Smokeless tobacco: Never   Vaping Use    Vaping Use: Every day    Substances: Nicotine    Devices: Disposable   Substance and Sexual Activity    Alcohol use: Yes     Alcohol/week: 15.0 standard drinks     Types: 15 Shots of liquor per week     Comment: 15 per weekend    Drug use: Yes     Frequency: 7.0 times per week     Types: Marijuana Lewisdeborah Esqueda)    Sexual activity: Yes     Partners: Female   Other Topics Concern    Not on file   Social History Narrative    Not on file     Social Determinants of Health     Financial Resource Strain: Not on file   Food Insecurity: Not on file   Transportation Needs: Not on file   Physical Activity: Not on file   Stress: Not on file   Social Connections: Not on file   Intimate Partner Violence: Not on file   Housing Stability: Not on file           ROS:  [x] All negative/unchanged except if checked.  Explain positive(checked items) below:  [] Constitutional  [] Eyes  [] Ear/Nose/Mouth/Throat  [] Respiratory  [] CV  [] GI  []   [] Musculoskeletal  [] Skin/Breast  [] Neurological  [] Endocrine  [] Heme/Lymph  [] Allergic/Immunologic    Explanation:     MEDICATIONS:    Current Facility-Administered Medications:     acetaminophen (TYLENOL) tablet 650 mg, 650 mg, Oral, Q6H PRN, Malick Yip MD    magnesium hydroxide (MILK OF MAGNESIA) 400 MG/5ML suspension 30 mL, 30 mL, Oral, Daily PRN, Malick Yip MD    aluminum & magnesium hydroxide-simethicone (MAALOX) 200-200-20 MG/5ML suspension 30 mL, 30 mL, Oral, PRN, Malick Yip MD    hydrOXYzine pamoate (VISTARIL) capsule 50 mg, 50 mg, Oral, TID PRN, Malick Yip MD, 50 mg at 01/25/23 1057    haloperidol (HALDOL) tablet 5 mg, 5 mg, Oral, Q6H PRN **OR** haloperidol lactate (HALDOL) injection 5 mg, 5 mg, IntraMUSCular, Q6H PRN, Malick Yip MD    melatonin tablet 3 mg, 3 mg, Oral, Nightly, Malick Yip MD, 3 mg at 01/24/23 2106    nicotine polacrilex (NICORETTE) gum 4 mg, 4 mg, Oral, F0P PRN, Damaris Eye Dellick, APRN - CNP, 4 mg at 01/25/23 0704    divalproex (DEPAKOTE ER) extended release tablet 1,000 mg, 1,000 mg, Oral, Daily, Oral Shirts, APRN - CNP, 1,228 mg at 01/25/23 0844    [START ON 1/26/2023] risperiDONE ER (PERSERIS) subcutaneous injection 120 mg, 120 mg, SubCUTAneous, Q30 Days, Georgiana Estradajohnyga, APRN - CNP      Examination:  BP (!) 115/55   Pulse 64   Temp 97.5 °F (36.4 °C) (Temporal)   Resp 16   Ht 5' 8\" (1.727 m)   Wt 175 lb (79.4 kg)   SpO2 98%   BMI 26.61 kg/m²   Gait - steady  Medication side effects(SE):     Mental Status Examination:    Level of consciousness:  within normal limits   Appearance:  fair grooming and fair hygiene  Behavior/Motor:  no abnormalities noted  Attitude toward examiner:  cooperative  Speech:  spontaneous, normal rate and normal volume   Mood: \" My mood is good. \"  Affect: Bright appropriate and pleasant  Thought processes: Linear thought flight of ideas loose associations  Thought content: Devoid of any auditory visual hallucinations delusions or other perceptual normalities.   Denies SI/HI intent or plan   Language: able to name objects and repeate phrases  Remote Memory: intact  Recent Memory: intact  Cognition:  oriented to person, place, and time   Fund of Knowledge: Vocabulary intact, pt is aware of current events and past history  Attetion and Concentration intact  Insight fair   Judgement fair       ASSESSMENT: Patient symptoms are:  [] Well controlled  [] Improving  [] Worsening  [] No change      Diagnosis:  Principal Problem:    Bipolar 1 disorder, mixed ([facility]  Active Problems:    Autism spectrum disorder  Resolved Problems:    Bipolar affective, mixed ([facility]      LABS:    Recent Labs     01/23/23 2050   WBC 9.2   HGB 16.3        Recent Labs     01/23/23 2050      K 3.6      CO2 28   BUN 12   CREATININE 0.9   GLUCOSE 115*     Recent Labs     01/23/23 2050   BILITOT 0.3   ALKPHOS 84   AST 16   ALT 11     Lab Results   Component Value Date/Time    LABAMPH NOT DETECTED 01/23/2023 09:30 PM    BARBSCNU NOT DETECTED 01/23/2023 09:30 PM    LABBENZ NOT DETECTED 01/23/2023 09:30 PM    LABMETH NOT DETECTED 01/23/2023 09:30 PM    OPIATESCREENURINE NOT DETECTED 01/23/2023 09:30 PM    PHENCYCLIDINESCREENURINE NOT DETECTED 01/23/2023 09:30 PM    ETOH <10 01/23/2023 08:50 PM     Lab Results   Component Value Date/Time    TSH 2.210 05/27/2022 10:18 PM     No results found for: LITHIUM  Lab Results   Component Value Date    VALPROATE 3 (L) 01/23/2023           Treatment Plan:  Reviewed current Medications with the patient. Risks, benefits, side effects, drug-to-drug interactions and alternatives to treatment were discussed. Collateral information:   CD evaluation  Encourage patient to attend group and other milieu activities.   Discharge planning discussed with the patient and treatment team.  Depakote DR 1000 mg at bedtime  Patient is due for his Perseris injection 120 mg subcu tomorrow      PSYCHOTHERAPY/COUNSELING:  [x] Therapeutic interview  [x] Supportive  [] CBT  [] Ongoing  [] Other    [x] Patient continues to need, on a daily basis, active treatment furnished directly by or requiring the supervision of inpatient psychiatric personnel      Anticipated Length of stay: 3 to 7 days based on stability            Electronically signed by NAN Jaquez CNP on 0/13/0199 at 4:03 PM

## 2023-01-25 NOTE — BH NOTE
Patient is bright and loud in the day area. Patient voices no complaints of discomfort. Patient sociable with others. Patient repeatedly states he does not need to be here. Takes meds and goes to groups. Patient attention seeking.

## 2023-01-25 NOTE — PROGRESS NOTES
Pt's mother called from senior living giving information. She did have pin number. She stated that the pt has an appointment on Thursday at [facility] for his BONILLA. He see's Dr. Jane Mark and his  is Migue Tatum. She stated that pt refuses to take depakote d/t it causing him to drool. She also wanted to add 2 allergies to the pt's list. Abilify and Ritalin. Unable to verify this with pt d/t pt resting in bed at this time. Pt denies SI, HI and AVH. Pt social. Incongruent. Tangential. Spoke about getting a girl pregnant \"She's going to give me a baby girl. \" Pt attends group. Medication compliant. Will continue to monitor.

## 2023-01-26 PROCEDURE — 1240000000 HC EMOTIONAL WELLNESS R&B

## 2023-01-26 PROCEDURE — 99232 SBSQ HOSP IP/OBS MODERATE 35: CPT | Performed by: NURSE PRACTITIONER

## 2023-01-26 PROCEDURE — 6370000000 HC RX 637 (ALT 250 FOR IP): Performed by: NURSE PRACTITIONER

## 2023-01-26 PROCEDURE — 6370000000 HC RX 637 (ALT 250 FOR IP): Performed by: PSYCHIATRY & NEUROLOGY

## 2023-01-26 RX ADMIN — DIVALPROEX SODIUM 1000 MG: 500 TABLET, EXTENDED RELEASE ORAL at 08:45

## 2023-01-26 RX ADMIN — MELATONIN 3 MG ORAL TABLET 3 MG: 3 TABLET ORAL at 20:57

## 2023-01-26 RX ADMIN — HYDROXYZINE PAMOATE 50 MG: 50 CAPSULE ORAL at 12:07

## 2023-01-26 RX ADMIN — NICOTINE POLACRILEX 4 MG: 2 GUM, CHEWING BUCCAL at 08:28

## 2023-01-26 ASSESSMENT — PAIN SCALES - GENERAL
PAINLEVEL_OUTOF10: 0
PAINLEVEL_OUTOF10: 0

## 2023-01-26 NOTE — CARE COORDINATION
Yovani contacted pt friend/brother Luis . No answer, yovani unable to leave voicemail due to mailbox not being set up.

## 2023-01-26 NOTE — CARE COORDINATION
Sw met with pt to discuss the threats that he made that are documented in his chart. Pt states that he never made any threats. He states that his mother is in detention and his father left when he was 1years old and he has no idea where he currently is. Pt states that he never made any threats against his grandparents either. Pt denies that he made any threats against his uncle Marii Yeung who lives by [employer], but was documented to have made threats against him. Pt agreed to sign NINA for his brother/friend that he has been staying with and plans to discharge [facility] [address] [facility]. Yovani contacted [employer] [phone] and spoke with [facility] who states that she took down the information, but that yovani will need to contact [phone] to make a formal report to make sure all bases are covered. Yovani called and spoke with Lennette Gilford and provided information relevant for duty to warn. She states that she is familiar with pt. Yovani contacted pt friend/brother Luis . No answer, yovani unable to leave voicemail due to mailbox not being set up.

## 2023-01-26 NOTE — GROUP NOTE
Group Therapy Note    Date: 1/26/2023    Group Start Time: 1000  Group End Time: 1050  Group Topic: Cognitive Skills    [facility] ACUTE BH 1    Thankful CHELSEY Todd, MURRAY        Group Therapy Note    Attendees: 10       Patient's Goal:  Pt was an active participant in group therapy. We discussed unit rules and regulations. We also learned about depression and mental health facts. Notes:  Pt identified their daily goal as, \"contact brother for collateral.\"    Status After Intervention:  Unchanged    Participation Level: Minimal    Participation Quality: Resistant      Speech:  normal      Thought Process/Content: Logical      Affective Functioning: Congruent      Mood: depressed and irritable      Level of consciousness:  Inattentive      Response to Learning: Resistant      Endings: None Reported    Modes of Intervention: Education, Support, Socialization, Exploration, Clarifying, and Problem-solving      Discipline Responsible: /Counselor      Signature:   CHELSEY Varghese, MURRAY

## 2023-01-26 NOTE — PLAN OF CARE
Problem: Shilpa  Goal: Will exhibit normal sleep and speech and no impulsivity  Description: INTERVENTIONS:  1. Administer medication as ordered  2. Set limits on impulsive behavior  3. Make attempts to decrease external stimuli as possible  1/26/2023 0748 by Larry Virk RN  Outcome: Progressing  1/26/2023 0524 by Shirin Pratt RN  Outcome: Progressing     Problem: Behavior  Goal: Pt/Family maintain appropriate behavior and adhere to behavioral management agreement, if implemented  Description: INTERVENTIONS:  1. Assess patient/family's coping skills and  non-compliant behavior (including use of illegal substances)  2. Notify security of behavior or suspected illegal substances which indicate the need for search of the family and/or belongings  3. Encourage verbalization of thoughts and concerns in a socially appropriate manner  4. Utilize positive, consistent limit setting strategies supporting safety of patient, staff and others  5. Encourage participation in the decision making process about the behavioral management agreement  6. If a visitor's behavior poses a threat to safety call refer to organization policy. 7. Initiate consult with , Psychosocial CNS, Spiritual Care as appropriate  1/26/2023 4304 by Larry Virk RN  Outcome: Progressing  1/26/2023 0524 by Shirin Pratt RN  Outcome: Progressing     Problem: Anxiety  Goal: Will report anxiety at manageable levels  Description: INTERVENTIONS:  1. Administer medication as ordered  2. Teach and rehearse alternative coping skills  3. Provide emotional support with 1:1 interaction with staff  1/26/2023 0748 by Larry Virk RN  Outcome: Progressing  1/26/2023 0524 by Shirin Pratt RN  Outcome: Progressing     Problem: Sleep Disturbance  Goal: Will exhibit normal sleeping pattern  Description: INTERVENTIONS:  1. Administer medication as ordered  2. Decrease environmental stimuli, including noise, as appropriate  3.  Discourage social isolation and naps during the day  1/26/2023 0748 by Betsey Dc RN  Outcome: Progressing  1/26/2023 0524 by Nick Pedersen RN  Outcome: Progressing     Problem: Involuntary Admit  Goal: Will cooperate with staff recommendations and doctor's orders and will demonstrate appropriate behavior  Description: INTERVENTIONS:  1. Treat underlying conditions and offer medication as ordered  2. Educate regarding involuntary admission procedures and rules  3. Contain excessive/inappropriate behavior per unit and hospital policies  9/63/6119 [id number] by Betsey Dc RN  Outcome: Progressing  1/26/2023 0524 by Nick Pedersen RN  Outcome: Progressing     Problem: Risk for Elopement  Goal: Patient will not exit the unit/facility without proper excort  1/26/2023 0748 by Betsey Dc RN  Outcome: Progressing  1/26/2023 0524 by Nick Pedersen RN  Outcome: Progressing     Problem: Pain  Goal: Verbalizes/displays adequate comfort level or baseline comfort level  1/26/2023 0748 by Betsey Dc RN  Outcome: Progressing  1/26/2023 0524 by Nick Pedersen RN  Outcome: Progressing     Received report from previous shift, alert and oriented x 4, pleasant and cooperative with staff, irritable, easily agitated, poor insight, impulsive, disheveled dressed in own clothes, fixated on discharge today, signed voluntary admission form, needs redirection. Pt medication compliant, attended groups, denies suicidal or homicidal ideation, contracts for safety, denies hallucinations, no somatic complaints noted, Q 15 minute checks for his safety and protection. 1204 hours, Pt agitated about having to wait to find out if he's being discharged, needing frequent redirection, poor insight, impulsive, demanding, expects immediate gratification. 1207 hours, Pt complaining of anxiety, requested and received vistaril 50 mg p.o., will assess for results.

## 2023-01-26 NOTE — PROGRESS NOTES
BEHAVIORAL HEALTH FOLLOW-UP NOTE     1/26/2023     Patient was seen and examined in person, Chart reviewed   Patient's case discussed with staff/team    Chief Complaint: \"I never threatened to hurt anybody. \"    Interim History: Patient up on the unit denies that he ever threatened to hurt anybody states that he never made those threats at [facility] children. 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 He states that he plans to live with his friend/brother on discharge and is working with social workers to provide the phone number. He is out visible in the milieu he denies suicidal or homicidal ideations intent or plan denies any auditory or visual hallucinations no overt or covert signs of psychosis he has been eating well sleeping well no neurovegetative signs of depression    Appetite: [x] Normal/Unchanged  [] Increased  [] Decreased      Sleep:       [x] Normal/Unchanged  [] Fair       [] Poor              Energy:    [x] Normal/Unchanged  [] Increased  [] Decreased        SI [] Present  [x] Absent    HI  []Present  [x] Absent     Aggression:  [] yes  [x] no    Patient is [x] able  [] unable to CONTRACT FOR SAFETY     PAST MEDICAL/PSYCHIATRIC HISTORY:   Past Medical History:   Diagnosis Date    ADHD (attention deficit hyperactivity disorder)     Autism disorder     GERD (gastroesophageal reflux disease)     Hypotonia     Intermittent explosive disorder     Microcephalic ([address]     Schizoaffective disorder ([facility]        FAMILY/SOCIAL HISTORY:  History reviewed. No pertinent family history.   Social History     Socioeconomic History    Marital status: Single     Spouse name: Not on file    Number of children: 2    Years of education: 11    Highest education level: Not on file   Occupational History    Not on file   Tobacco Use    Smoking status: Every Day     Packs/day: 1.00     Years: 6.00     Pack years: 6.00     Types: Cigarettes    Smokeless tobacco: Never   Vaping Use    Vaping Use: Every day Substances: Nicotine    Devices: Disposable   Substance and Sexual Activity    Alcohol use: Yes     Alcohol/week: 15.0 standard drinks     Types: 15 Shots of liquor per week     Comment: 15 per weekend    Drug use: Yes     Frequency: 7.0 times per week     Types: Marijuana [place])    Sexual activity: Yes     Partners: Female   Other Topics Concern    Not on file   Social History Narrative    Not on file     Social Determinants of Health     Financial Resource Strain: Not on file   Food Insecurity: Not on file   Transportation Needs: Not on file   Physical Activity: Not on file   Stress: Not on file   Social Connections: Not on file   Intimate Partner Violence: Not on file   Housing Stability: Not on file           ROS:  [x] All negative/unchanged except if checked.  Explain positive(checked items) below:  [] Constitutional  [] Eyes  [] Ear/Nose/Mouth/Throat  [] Respiratory  [] CV  [] GI  []   [] Musculoskeletal  [] Skin/Breast  [] Neurological  [] Endocrine  [] Heme/Lymph  [] Allergic/Immunologic    Explanation:     MEDICATIONS:    Current Facility-Administered Medications:     acetaminophen (TYLENOL) tablet 650 mg, 650 mg, Oral, Q6H PRN, Airam Arizmendi MD    magnesium hydroxide (MILK OF MAGNESIA) 400 MG/5ML suspension 30 mL, 30 mL, Oral, Daily PRN, Airam Arizmendi MD    aluminum & magnesium hydroxide-simethicone (MAALOX) 200-200-20 MG/5ML suspension 30 mL, 30 mL, Oral, PRN, Airam Arizmendi MD    hydrOXYzine pamoate (VISTARIL) capsule 50 mg, 50 mg, Oral, TID PRN, Airam Arizmendi MD, 50 mg at 01/26/23 1207    haloperidol (HALDOL) tablet 5 mg, 5 mg, Oral, Q6H PRN, 5 mg at 01/25/23 1910 **OR** haloperidol lactate (HALDOL) injection 5 mg, 5 mg, IntraMUSCular, Q6H PRN, Airam Arizmendi MD    melatonin tablet 3 mg, 3 mg, Oral, Nightly, Airam Arizmendi MD, 3 mg at 01/24/23 2106    nicotine polacrilex (NICORETTE) gum 4 mg, 4 mg, Oral, H0B PRN, NAN Dial CNP, 4 mg at 01/26/23 0828    divalproex (DEPAKOTE ER) extended release tablet 1,000 mg, 1,000 mg, Oral, Daily, Wing Emma Hart APRMARLO - CNP, 1,131 mg at 01/26/23 0845    risperiDONE ER (PERSERIS) subcutaneous injection 120 mg, 120 mg, SubCUTAneous, Q30 Days, Wing Emma Hart APRMARLO Joe CNP, 548 mg at 01/26/23 1112      Examination:  BP (!) 104/56   Pulse 51   Temp 97.8 °F (36.6 °C) (Oral)   Resp 15   Ht 5' 8\" (1.727 m)   Wt 175 lb (79.4 kg)   SpO2 98%   BMI 26.61 kg/m²   Gait - steady  Medication side effects(SE):     Mental Status Examination:    Level of consciousness:  within normal limits   Appearance:  fair grooming and fair hygiene  Behavior/Motor:  no abnormalities noted  Attitude toward examiner:  cooperative  Speech:  spontaneous, normal rate and normal volume   Mood: \" My mood is good. \"  Affect: Bright appropriate and pleasant  Thought processes: Linear thought flight of ideas loose associations  Thought content: Devoid of any auditory visual hallucinations delusions or other perceptual normalities.   Denies SI/HI intent or plan   Language: able to name objects and repeate phrases  Remote Memory: intact  Recent Memory: intact  Cognition:  oriented to person, place, and time   Fund of Knowledge: Vocabulary intact, pt is aware of current events and past history  Attetion and Concentration intact  Insight fair   Judgement fair       ASSESSMENT: Patient symptoms are:  [] Well controlled  [] Improving  [] Worsening  [] No change      Diagnosis:  Principal Problem:    Bipolar 1 disorder, mixed ([facility]  Active Problems:    Autism spectrum disorder  Resolved Problems:    Bipolar affective, mixed ([facility]      LABS:    Recent Labs     01/23/23 2050   WBC 9.2   HGB 16.3        Recent Labs     01/23/23 2050      K 3.6      CO2 28   BUN 12   CREATININE 0.9   GLUCOSE 115*     Recent Labs     01/23/23 2050   BILITOT 0.3   ALKPHOS 84   AST 16   ALT 11     Lab Results   Component Value Date/Time    LABAMPH NOT DETECTED 01/23/2023 09:30 PM BARBSCNU NOT DETECTED 01/23/2023 09:30 PM    LABBENZ NOT DETECTED 01/23/2023 09:30 PM    LABMETH NOT DETECTED 01/23/2023 09:30 PM    OPIATESCREENURINE NOT DETECTED 01/23/2023 09:30 PM    PHENCYCLIDINESCREENURINE NOT DETECTED 01/23/2023 09:30 PM    ETOH <10 01/23/2023 08:50 PM     Lab Results   Component Value Date/Time    TSH 2.210 05/27/2022 10:18 PM     No results found for: LITHIUM  Lab Results   Component Value Date    VALPROATE 3 (L) 01/23/2023           Treatment Plan:  Reviewed current Medications with the patient. Risks, benefits, side effects, drug-to-drug interactions and alternatives to treatment were discussed. Collateral information:   CD evaluation  Encourage patient to attend group and other milieu activities.   Discharge planning discussed with the patient and treatment team.  Depakote DR 1000 mg at bedtime  Patient is due for his Perseris injection 120 mg subcu today    Duty to warn is being conducted by   Social workers attempting to get in touch with patient's friend/brother  PSYCHOTHERAPY/COUNSELING:  [x] Therapeutic interview  [x] Supportive  [] CBT  [] Ongoing  [] Other    [x] Patient continues to need, on a daily basis, active treatment furnished directly by or requiring the supervision of inpatient psychiatric personnel      Anticipated Length of stay: 3 to 7 days based on stability            Electronically signed by NAN Cosby CNP on 4/69/0706 at 3:01 PM
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
## 2023-01-27 VITALS
HEIGHT: 68 IN | WEIGHT: 175 LBS | BODY MASS INDEX: 26.52 KG/M2 | OXYGEN SATURATION: 98 % | DIASTOLIC BLOOD PRESSURE: 51 MMHG | SYSTOLIC BLOOD PRESSURE: 93 MMHG | HEART RATE: 89 BPM | RESPIRATION RATE: 14 BRPM | TEMPERATURE: 97 F

## 2023-01-27 LAB — VALPROIC ACID LEVEL: 35 MCG/ML (ref 50–100)

## 2023-01-27 PROCEDURE — 6370000000 HC RX 637 (ALT 250 FOR IP): Performed by: NURSE PRACTITIONER

## 2023-01-27 PROCEDURE — 36415 COLL VENOUS BLD VENIPUNCTURE: CPT

## 2023-01-27 PROCEDURE — 80164 ASSAY DIPROPYLACETIC ACD TOT: CPT

## 2023-01-27 PROCEDURE — 99239 HOSP IP/OBS DSCHRG MGMT >30: CPT | Performed by: NURSE PRACTITIONER

## 2023-01-27 RX ORDER — LANOLIN ALCOHOL/MO/W.PET/CERES
3 CREAM (GRAM) TOPICAL NIGHTLY
Qty: 30 TABLET | Refills: 0 | Status: SHIPPED | OUTPATIENT
Start: 2023-01-27 | End: 2023-02-26

## 2023-01-27 RX ADMIN — NICOTINE POLACRILEX 4 MG: 2 GUM, CHEWING BUCCAL at 09:17

## 2023-01-27 RX ADMIN — DIVALPROEX SODIUM 1000 MG: 500 TABLET, EXTENDED RELEASE ORAL at 12:26

## 2023-01-27 ASSESSMENT — PAIN SCALES - GENERAL: PAINLEVEL_OUTOF10: 0

## 2023-01-27 NOTE — BH NOTE
[facility]  Day 3 Interdisciplinary Treatment Plan NOTE    Review Date & Time: 1/27 0915    Patient was in treatment team    Estimated Length of Stay Update:  3-5  Estimated Discharge Date Update: 1/27    EDUCATION:   Learner Progress Toward Treatment Goals: Reviewed results and recommendations of this team, Reviewed group plan and strategies, Reviewed signs, symptoms and risk of self harm and violent behavior, and Reviewed goals and plan of care    Method: Small group    Outcome: Verbalized understanding and Demonstrated Understanding    PATIENT GOALS: go home    PLAN/TREATMENT RECOMMENDATIONS UPDATE:encourage meds and groups    GOALS UPDATE:   Time frame for Short-Term Goals: 1-3      Denis Bonilla RN

## 2023-01-27 NOTE — GROUP NOTE
Group Therapy Note    Date: 1/27/2023    Group Start Time: 1000  Group End Time: 1100  Group Topic: Cognitive Skills    [facility] ACUTE BH 1    Thankful CHELSEY Todd, MURRAY        Group Therapy Note    Attendees: 12       Patient's Goal:  Pt attended community support group. We discussed unit rules and expectations. We also learned about music therapy, the benefits of music and how it impacts us, our mood and behavior. Notes:  Pt was an active participant in group therapy. They identified their daily goal as, \"to stop acting stupid and coming back. \"    Status After Intervention:  Improved    Participation Level: Active Listener, Interactive, and Monopolizing    Participation Quality: Attentive, Sharing, and Intrusive      Speech:  normal      Thought Process/Content: Logical      Affective Functioning: Congruent      Mood: euthymic      Level of consciousness:  Alert and Attentive      Response to Learning: Able to retain information      Endings: None Reported    Modes of Intervention: Education, Support, Socialization, Exploration, Clarifying, and Problem-solving      Discipline Responsible: /Counselor      Signature:   CHELSEY Velasquez LSW

## 2023-01-27 NOTE — DISCHARGE SUMMARY
DISCHARGE SUMMARY      Patient ID:  Kavitha Burton  [id number]  21 y.o.  2005    Admit date: 1/23/2023    Discharge date and time: 1/27/2023    Admitting Physician: Kimberly Barrett MD     Discharge Physician: Dr Flaca Macdonald MD    Discharge Diagnoses:   Patient Active Problem List   Diagnosis    Acute psychosis ([facility]    Bipolar affective disorder, manic, severe, with psychotic behavior ([facility]    Autism spectrum disorder    Cannabis abuse    Bipolar 1 disorder, mixed ([facility]       Admission Condition: poor    Discharged Condition: stable    Admission Circumstance: presented to [facility] for his ankle and was pink slipped after patient was heard making threats to his uncle apparently threatening to go get his Glock 39 and going to his parents house in [place] to kill them and robbed them arrival to the ED patient denied that he ever threatened to kill anyone. PAST MEDICAL/PSYCHIATRIC HISTORY:   Past Medical History:   Diagnosis Date    ADHD (attention deficit hyperactivity disorder)     Autism disorder     GERD (gastroesophageal reflux disease)     Hypotonia     Intermittent explosive disorder     Microcephalic ([facility]     Schizoaffective disorder (HCC)        FAMILY/SOCIAL HISTORY:  History reviewed. No pertinent family history. Social History     Socioeconomic History    Marital status: Single     Spouse name: Not on file    Number of children: 2    Years of education: 11    Highest education level: Not on file   Occupational History    Not on file   Tobacco Use    Smoking status: Every Day     Packs/day: 1.00     Years: 6.00     Pack years: 6.00     Types: Cigarettes    Smokeless tobacco: Never   Vaping Use    Vaping Use: Every day    Substances: Nicotine    Devices: Disposable   Substance and Sexual Activity    Alcohol use:  Yes     Alcohol/week: 15.0 standard drinks     Types: 15 Shots of liquor per week     Comment: 15 per weekend    Drug use: Yes     Frequency: 7.0 times per week Types: Marijuana (Jennifer Quale)    Sexual activity: Yes     Partners: Female   Other Topics Concern    Not on file   Social History Narrative    Not on file     Social Determinants of Health     Financial Resource Strain: Not on file   Food Insecurity: Not on file   Transportation Needs: Not on file   Physical Activity: Not on file   Stress: Not on file   Social Connections: Not on file   Intimate Partner Violence: Not on file   Housing Stability: Not on file       MEDICATIONS:    Current Facility-Administered Medications:     acetaminophen (TYLENOL) tablet 650 mg, 650 mg, Oral, Q6H PRN, Eveline Snell MD    magnesium hydroxide (MILK OF MAGNESIA) 400 MG/5ML suspension 30 mL, 30 mL, Oral, Daily PRN, Eveline Snell MD    aluminum & magnesium hydroxide-simethicone (MAALOX) 200-200-20 MG/5ML suspension 30 mL, 30 mL, Oral, PRN, Eveline Snell MD    hydrOXYzine pamoate (VISTARIL) capsule 50 mg, 50 mg, Oral, TID PRN, Eveline Snell MD, 50 mg at 01/26/23 1207    haloperidol (HALDOL) tablet 5 mg, 5 mg, Oral, Q6H PRN, 5 mg at 01/25/23 1910 **OR** haloperidol lactate (HALDOL) injection 5 mg, 5 mg, IntraMUSCular, Q6H PRN, Eveline Snell MD    melatonin tablet 3 mg, 3 mg, Oral, Nightly, Eveline Snell MD, 3 mg at 01/26/23 2057    nicotine polacrilex (NICORETTE) gum 4 mg, 4 mg, Oral, I8F PRN, Rachel Ramon APRN - CNP, 4 mg at 01/27/23 0917    divalproex (DEPAKOTE ER) extended release tablet 1,000 mg, 1,000 mg, Oral, Daily, Rachel Ramon APRN - CNP, 6,387 mg at 01/27/23 1226    risperiDONE ER (PERSERIS) subcutaneous injection 120 mg, 120 mg, SubCUTAneous, Q30 Days, Clarita Hart APRN - CNP, 718 mg at 01/26/23 1112    Examination:  BP (!) 93/51   Pulse 89   Temp 97 °F (36.1 °C) (Oral)   Resp 14   Ht 5' 8\" (1.727 m)   Wt 175 lb (79.4 kg)   SpO2 98%   BMI 26.61 kg/m²   Gait - steady    HOSPITAL COURSE[de-identified]   Patient was admitted to the unit on 1/23/23 was closely monitored for suicidal ideations.   He was evaluated was treated with Perseris 120 mg subcu every 30 days given on 1/26/2023 Next injection be due 2/25/2023 and Depakote ER 1000 g at bedtime. Medical events were insignificant and patient continued to improve on the floor. He start coming out of his room he is attending groups to socializing with peers. He never made any suicidal statements or any suicidal gestures while in the unit. He never made any homicidal statements or any homicidal threats. He denies that he ever made any homicidal threat towards anyone denies access to any guns. Social workers to conduct a duty to warn due to report the patient made threats about [place] children[facility] although he never made any threats while here on the unit. Social workers attempted to gain confirmation from patient's brother who patient lives with however social workers did not receive a call back despite multiple times to contact him. Patient indicated that he knew the address and knew how to get there on the bus so patient was discharged the bus station in order to transport back to where he had been staying. He was treatment team felt the patient obtain the maximum benefit from his hospitalization he was set up with an outpatient mental health agency for outpatient follow-up services. At the time of discharge patient did not show any impulsive behavior. He was up on the unit he was attending groups and socializing with peers. He vehemently denied any suicidal homicidal ideations intent or plan. He was eating well and sleeping well there are no neurovegetative signs or symptoms of depression he denied any auditory or visual hallucinations. There are no overt or covert signs of psychosis. He was appreciative of the help that he received here. This patient no longer meets criteria for inpatient hospitalization.       No AVH or paranoid thoughts  No Hopeless or worthless feeling  No active SI/HI  Appetite:  [x] Normal  [] Increased  [] Decreased    Sleep:       [x] Normal  [] Fair       [] Poor            Energy:    [x] Normal  [] Increased  [] Decreased     SI [] Present  [x] Absent  HI  []Present  [x] Absent   Aggression:  [] yes  [x] no  Patient is [x] able  [] unable to CONTRACT FOR SAFETY   Medication side effects(SE):  [x] None(Psych. Meds.) [] Other      Mental Status Examination on discharge:    Level of consciousness:  within normal limits   Appearance:  well-appearing  Behavior/Motor:  no abnormalities noted  Attitude toward examiner:  attentive and good eye contact  Speech:  spontaneous, normal rate and normal volume   Mood: \"My mood is good. \"  Affect: Appropriate and pleasant  Thought processes: linear without flight of ideas loose associations  Thought content: Devoid of any auditory visual hallucinations delusions or perceptual abnormalities. SI/HI intent or plan  Cognition:  oriented to person, place, and time   Concentration intact  Memory intact  Insight good   Judgement fair   Fund of Knowledge adequate      ASSESSMENT:  Patient symptoms are:  [x] Well controlled  [x] Improving  [] Worsening  [] No change    Reason for more than one antipsychotic:  [x] N/A  [] 3 Failed Monotherapy attempts (Drugs tried:)  [] Crossover to a new antipsychotic  [] Taper to Monotherapy from Polypharmacy  [] Augmentation of clozapine therapy due to treatment resistance to single therapy    Diagnosis:  Principal Problem:    Bipolar 1 disorder, mixed ([facility]  Active Problems:    Autism spectrum disorder  Resolved Problems:    Bipolar affective, mixed ([facility]      LABS:    No results for input(s): WBC, HGB, PLT in the last 72 hours. No results for input(s): NA, K, CL, CO2, BUN, CREATININE, GLUCOSE in the last 72 hours. No results for input(s): BILITOT, ALKPHOS, AST, ALT in the last 72 hours.   Lab Results   Component Value Date/Time    LABAMPH NOT DETECTED 01/23/2023 09:30 PM    BARBSCNU NOT DETECTED 01/23/2023 09:30 PM    LABBENZ NOT DETECTED 01/23/2023 09:30 PM    LABMETH NOT DETECTED 01/23/2023 09:30 PM    OPIATESCREENURINE NOT DETECTED 01/23/2023 09:30 PM    PHENCYCLIDINESCREENURINE NOT DETECTED 01/23/2023 09:30 PM    ETOH <10 01/23/2023 08:50 PM     Lab Results   Component Value Date/Time    TSH 2.210 05/27/2022 10:18 PM     No results found for: LITHIUM  Lab Results   Component Value Date    VALPROATE 3 (L) 01/23/2023       RISK ASSESSMENT AT DISCHARGE: Low risk for suicide and homicide. Treatment Plan:  Reviewed current Medications with the patient. Education provided on the complaince with treatment. Risks, benefits, side effects, drug-to-drug interactions and alternatives to treatment were discussed. Encourage patient to attend outpatient follow up appointment and therapy. Patient was advised to call the outpatient provider, visit the nearest ED or call 911 if symptoms are not manageable. Patient's family member was contacted prior to the discharge.          Medication List        START taking these medications      melatonin 3 MG Tabs tablet  Take 1 tablet by mouth nightly     nicotine polacrilex 4 MG gum  Commonly known as: NICORETTE  Take 1 each by mouth every 2 hours as needed for Smoking cessation            CHANGE how you take these medications      risperiDONE  MG Prsy subcutaneous injection  Commonly known as: PERSERIS  Inject 0.8 mLs into the skin every 30 days for 1 day Next injection is due 2/25/23  Start taking on: February 25, 2023  What changed: additional instructions            CONTINUE taking these medications      divalproex 500 MG extended release tablet  Commonly known as: DEPAKOTE ER            STOP taking these medications      risperiDONE microspheres ER 50 MG injection  Commonly known as: RISPERNARAYAN PARKA               Where to Get Your Medications        These medications were sent to [facility] [address] [address] [address]      Phone: [phone]   melatonin 3 MG Tabs tablet       You can get these medications from any pharmacy    Bring a paper prescription for each of these medications  risperiDONE  MG Prsy subcutaneous injection       Information about where to get these medications is not yet available    Ask your nurse or doctor about these medications  nicotine polacrilex 4 MG gum       Patient is counseled this may complaint all medications outpatient follow-up ointments    Patient is discharged home in stable condition    TIME SPEND - [facility], DISCHARGE SUMMARY, MEDICATION RECONCILIATION AND FOLLOW UP CARE     Signed:  NAN Sarmiento - CNP  0/10/9011  12:46 PM

## 2023-01-27 NOTE — PROGRESS NOTES
CLINICAL PHARMACY NOTE: MEDS TO BEDS    Total # of Prescriptions Filled: 1   The following medications were delivered to the patient:  Melatonin 3    Additional Documentation:

## 2023-01-27 NOTE — CARE COORDINATION
SW met with pt in treatment team to discuss a possible discharge today. Pt denied having any SI, HI, AVH. Pt reported that he does not have access to any guns. Pt stated that he is going to be staying with his brother when he is discharged from the hospital but he has not been in contact with his brother and feels that his phone is off. Pt stated that he is feeling good and his is planning on taking the bus home. Pt reported that he will be discharged to [place] [id number]. Pt reported that he may be able to call his grandma for transportation if needed. Pt did not give SW permission to speak with grandma to discuss discharge planning. Pt was bright and happy when he came into treatment team and he was smiling. Pt denied any SI, HI, AVH. NINA is signed for brother George Suarez. SW has not been able to get into contact with brother. Pt has follow up appointments scheduled at [facility] on 2/2.   In order to ensure appropriate transition and discharge planning is in place, the following documents have been transmitted to [facility], as the new outpatient provider:    The d/c diagnosis was transmitted to the next care provider  The reason for hospitalization was transmitted to the next care provider  The d/c medications (dosage and indication) were transmitted to the next care provider   The continuing care plan was transmitted to the next care provider'

## 2023-01-27 NOTE — CARE COORDINATION
Pt approached SW stating that at this time he will have to use the bus for transportation. Pt reported that he does not know how to use the bus to get back home. SW asked pt how he normally gets around in the community and pt reported that he uses the bus. Pt was then able to state that he knows what buses he needs to take to get back home where he was living before he came into the hospital.     At this time pt will be using the bus to return back home to his brothers house. Shanita has not been able to get into contact with pt's brother. Shanita contacted pt friend/brother Luis . There was no answer and his voicemail is not set up.

## 2023-01-27 NOTE — GROUP NOTE
Group Therapy Note    Date: 1/27/2023    Group Start Time: 2292  Group End Time: 1150  Group Topic: Psychotherapy    [facility]  [address], [facility]        Group Therapy Note    Attendees: 7       Goal:  To increase social interaction and improve relationships with others. Notes: Pt was attentive in group and was able to identify an agenda. Pt was also able to verbalize relating to others within the group.      Status After Intervention:  Improved    Participation Level: Monopolizing    Participation Quality: Appropriate, Attentive, and Sharing      Speech:  pressured      Thought Process/Content: Flight of ideas      Affective Functioning: Exaggerated      Mood: anxious      Level of consciousness:  Preoccupied      Response to Learning: Resistant      Endings: None Reported    Modes of Intervention: Support, Socialization, and Exploration      Discipline Responsible: /Counselor      Signature:  CHELSEY Olivares, [place]

## 2023-01-27 NOTE — PLAN OF CARE
Patient denies suicidal ideation, homicidal ideations and AVH. Patient denies anxiety and depression. Patient appears flat and anxious on approach but brightens with conversation. Patient states he is feeling Candor of Man. \"  Presents calm and cooperative during assessment. Patient is out on the unit and is social with peers. Medications taken without issue. No complaints or concerns verbalized at this time. No unit problems reported. Will continue to observe and support. Problem: Shilpa  Goal: Will exhibit normal sleep and speech and no impulsivity  Description: INTERVENTIONS:  1. Administer medication as ordered  2. Set limits on impulsive behavior  3. Make attempts to decrease external stimuli as possible  1/26/2023 2107 by Martina Ramey RN  Outcome: Progressing     Problem: Behavior  Goal: Pt/Family maintain appropriate behavior and adhere to behavioral management agreement, if implemented  Description: INTERVENTIONS:  1. Assess patient/family's coping skills and  non-compliant behavior (including use of illegal substances)  2. Notify security of behavior or suspected illegal substances which indicate the need for search of the family and/or belongings  3. Encourage verbalization of thoughts and concerns in a socially appropriate manner  4. Utilize positive, consistent limit setting strategies supporting safety of patient, staff and others  5. Encourage participation in the decision making process about the behavioral management agreement  6. If a visitor's behavior poses a threat to safety call refer to organization policy. 7. Initiate consult with , Psychosocial CNS, Spiritual Care as appropriate  1/26/2023 2107 by Martina Ramey RN  Outcome: Progressing     Problem: Anxiety  Goal: Will report anxiety at manageable levels  Description: INTERVENTIONS:  1. Administer medication as ordered  2. Teach and rehearse alternative coping skills  3.  Provide emotional support with 1:1 interaction with staff  1/26/2023 2107 by Philipp Elizalde RN  Outcome: Progressing  Flowsheets (Taken 1/26/2023 1256 by Bret Shah RN)  Will report anxiety at manageable levels:   Teach and rehearse alternative coping skills   Provide emotional support with 1:1 interaction with staff     Problem: Sleep Disturbance  Goal: Will exhibit normal sleeping pattern  Description: INTERVENTIONS:  1. Administer medication as ordered  2. Decrease environmental stimuli, including noise, as appropriate  3. Discourage social isolation and naps during the day  1/26/2023 2107 by Philipp Elizalde RN  Outcome: Progressing     Problem: Involuntary Admit  Goal: Will cooperate with staff recommendations and doctor's orders and will demonstrate appropriate behavior  Description: INTERVENTIONS:  1. Treat underlying conditions and offer medication as ordered  2. Educate regarding involuntary admission procedures and rules  3.  Contain excessive/inappropriate behavior per unit and hospital policies  6/28/4567 [id number] by Philipp Elizalde RN  Outcome: Progressing

## 2023-01-28 ENCOUNTER — APPOINTMENT (OUTPATIENT)
Dept: GENERAL RADIOLOGY | Age: 18
End: 2023-01-28
Payer: COMMERCIAL

## 2023-01-28 ENCOUNTER — HOSPITAL ENCOUNTER (EMERGENCY)
Age: 18
Discharge: HOME | End: 2023-01-28
Payer: COMMERCIAL

## 2023-01-28 VITALS
WEIGHT: 175 LBS | HEIGHT: 68 IN | RESPIRATION RATE: 16 BRPM | OXYGEN SATURATION: 97 % | HEART RATE: 79 BPM | BODY MASS INDEX: 26.52 KG/M2 | TEMPERATURE: 98.7 F | SYSTOLIC BLOOD PRESSURE: 137 MMHG | DIASTOLIC BLOOD PRESSURE: 85 MMHG

## 2023-01-28 DIAGNOSIS — M25.561 ACUTE PAIN OF RIGHT KNEE: Primary | ICD-10-CM

## 2023-01-28 DIAGNOSIS — Z59.00 HOMELESS: ICD-10-CM

## 2023-01-28 DIAGNOSIS — S80.01XA CONTUSION OF RIGHT KNEE, INITIAL ENCOUNTER: ICD-10-CM

## 2023-01-28 PROCEDURE — 73552 X-RAY EXAM OF FEMUR 2/>: CPT

## 2023-01-28 PROCEDURE — 6370000000 HC RX 637 (ALT 250 FOR IP): Performed by: NURSE PRACTITIONER

## 2023-01-28 PROCEDURE — 99283 EMERGENCY DEPT VISIT LOW MDM: CPT

## 2023-01-28 PROCEDURE — 73562 X-RAY EXAM OF KNEE 3: CPT

## 2023-01-28 RX ORDER — IBUPROFEN 800 MG/1
800 TABLET ORAL ONCE
Status: COMPLETED | OUTPATIENT
Start: 2023-01-28 | End: 2023-01-28

## 2023-01-28 RX ADMIN — IBUPROFEN 800 MG: 800 TABLET, FILM COATED ORAL at 20:12

## 2023-01-28 SDOH — ECONOMIC STABILITY - HOUSING INSECURITY: HOMELESSNESS UNSPECIFIED: Z59.00

## 2023-01-29 NOTE — ED PROVIDER NOTES
independent  HPI:  1/28/23, Time: 8:10 PM ANTHONY Santiago is a 21 y.o. male presenting to the ED for complaints of right knee pain after a fall just prior to arrival.  Patient presents to the emergency department by EMS, stating that he tripped over a large curb landing on his right knee. Patient denies striking his head denied feeling weak or dizzy. Reports pain primarily to the right lateral knee. Denies take anything for pain relief. Patient also requesting to speak with the  due to him being homeless. Per medical records patient was actually seen at [facility] earlier in the day with complaints of right ankle pain and also requested to see the . Per their note patient was seen by the  and patient at 1 point was in the [address] but he left there because he thought that it was dirty. Patient is not allowed in the  [facility] rescue [facility] due to noncompliance. The  did call that facility to verify this. The  also called the [address] but they do not have an intake person over the weekend and he was advised to check on Monday morning. Also per medical records they provided him with a round trip ride to the [place] rescue [facility] and he was to go directly to the [facility] leaving [facility]. Patient now here in  [place] and would like to speak with the  again. Patient is denying any suicidal or homicidal ideations. No hallucinations. No delusions.     Review of Systems:   A complete review of systems was performed and pertinent positives and negatives are stated within HPI, all other systems reviewed and are negative.          --------------------------------------------- PAST HISTORY ---------------------------------------------  Past Medical History:  has a past medical history of ADHD (attention deficit hyperactivity disorder), Autism disorder, GERD (gastroesophageal reflux disease), Hypotonia, Intermittent explosive disorder, Microcephalic ([facility] [address] and Schizoaffective disorder ([facility] [address] Past Surgical History:  has no past surgical history on file. Social History:  reports that he has been smoking cigarettes. He has a 6.00 pack-year smoking history. He has never used smokeless tobacco. He reports current alcohol use of about 15.0 standard drinks per week. He reports current drug use. Frequency: 7.00 times per week. Drug: Marijuana Berneta Kit). Family History: family history is not on file. The patients home medications have been reviewed. Allergies: Patient has no known allergies. -------------------------------------------------- RESULTS -------------------------------------------------  All laboratory and radiology results have been personally reviewed by myself   LABS:  No results found for this visit on 01/28/23. RADIOLOGY:  Interpreted by Radiologist.  XR KNEE RIGHT (3 VIEWS)   Final Result   Normal right femur and normal right knee. RECOMMENDATION:   The patient has a long history of right femur pain and if further evaluation   is desired, I recommend a bone scan         XR FEMUR RIGHT (MIN 2 VIEWS)   Final Result   Normal right femur and normal right knee. RECOMMENDATION:   The patient has a long history of right femur pain and if further evaluation   is desired, I recommend a bone scan             ------------------------- NURSING NOTES AND VITALS REVIEWED ---------------------------   The nursing notes within the ED encounter and vital signs as below have been reviewed.    /85   Pulse 79   Temp 98.7 °F (37.1 °C)   Resp 16   Ht 5' 8\" (1.727 m)   Wt 175 lb (79.4 kg)   SpO2 97%   BMI 26.61 kg/m²   Oxygen Saturation Interpretation: Normal      ---------------------------------------------------PHYSICAL EXAM--------------------------------------      Constitutional/General: Alert and oriented x3, unkept  Head: Normocephalic and atraumatic  Eyes: PERRL, EOMI  Mouth: Oropharynx clear, handling secretions, no trismus  Neck: Supple, full ROM,   Pulmonary: Lungs clear to auscultation bilaterally, no wheezes, rales, or rhonchi. Not in respiratory distress  Cardiovascular:  Regular rate and rhythm, no murmurs, gallops, or rubs. 2+ distal pulses  Abdomen: Soft, non tender, non distended,   Extremities: Moves all extremities x 4. Warm and well perfused, no obvious deformity or any area of soft tissue swelling or abrasion noted to the right knee. Patient expressed more point tenderness to the right lateral patella. Compartments soft otherwise full sensation intact. 2+ posterior tibial pulse  Skin: warm and dry without rash  Neurologic: GCS 15,  Psych: Normal Affect      ------------------------------ ED COURSE/MEDICAL DECISION MAKING----------------------  Medications   ibuprofen (ADVIL;MOTRIN) tablet 800 mg (800 mg Oral Given 1/28/23 2012)         ED COURSE:       Medical Decision Making:    Angel Foley is a 21 y.o. male presenting to the ED for complaints of right knee pain after a fall just prior to arrival.  Patient presents to the emergency department by EMS, stating that he tripped over a large curb landing on his right knee. Patient denies striking his head denied feeling weak or dizzy. Reports pain primarily to the right lateral knee. Denies take anything for pain relief. Patient also requesting to speak with the  due to him being homeless. Per medical records patient was actually seen at [facility] earlier in the day with complaints of right ankle pain and also requested to see the . Per their note patient was seen by the  and patient at 1 point was in the [address] but he left there because he thought that it was dirty. Patient is not allowed in the [place] [facility] due to noncompliance.   The  did call that facility to verify this.  The  also called the [address] but they do not have an intake person over the weekend and he was advised to check on Monday morning. Also per medical records they provided him with a round trip ride to the "[employer]" and he was to go directly to the [facility] after leaving [facility]. Patient now here in [facility] and would like to speak with the  again. Patient is denying any suicidal or homicidal ideations. No hallucinations. No delusions. Differential diagnosis includes right patella fracture versus right patella contusion versus right knee sprain.  also consulted. Patient provided with Motrin 800 mg tablet. X-ray of the right knee and the femur showing normal right femur as well as normal right knee. .  I did speak further with patient asking him why he was not at the [facility] per notes from [facility] stated that he was going to go there. Patient reports that at last minute he changed his mind and had them bring him to his brother's house. Patient states that the [facility] was not able to take him in for about another hour and he did not want to wait that 1 hour. Patient states he then went to his brother's house but his brother was not home. Patient noted to be on the phone during most of his emergency room stay. I question to he was on the phone with as if he could probably stay there. Patient states he was talking to his cousin Arabella Bonilla and she will let him stay there. I did speak with Hope this  she was made aware of patient's earlier visit at [facility].  She reports that she is well aware of him. She will try calling the [facility]. Patient no longer wants to wait to see the . He states that he will try calling friends himself. Patient was provided with 2 pops as well as a turkey sandwich. Patient discharged.   He did have an Ace wrap applied to his right knee. Patient educated on following up and following up with [facility] or any additional services to assist with home assistance. Patient is neurovascular and hemodynamically intact. Patient will be safely discharged        Counseling: The emergency provider has spoken with the patient and discussed todays results, in addition to providing specific details for the plan of care and counseling regarding the diagnosis and prognosis. Questions are answered at this time and they are agreeable with the plan. History from : Patient and Medical records     Limitations to history : None    Chronic Conditions: has a past medical history of ADHD (attention deficit hyperactivity disorder), Autism disorder, GERD (gastroesophageal reflux disease), Hypotonia, Intermittent explosive disorder, Microcephalic ([address] and Schizoaffective disorder ([address] CONSULTS:    Discussion with Other Profesionals : None    Social Determinants : Homeless,  reports that he has been smoking cigarettes. He has a 6.00 pack-year smoking history. He has never used smokeless tobacco. He reports current alcohol use of about 15.0 standard drinks per week. He reports current drug use. Frequency: 7.00 times per week. Drug: Marijuana Leela Amble). Records Reviewed : Source patient and Inpatient Notes epic medical records        Disposition Considerations (Tests not ordered but considered, Shared Decision Making, Pt Expectation of Test or Tx.):   Appropriate for outpatient management yes and Evaluation by myself and discharge recommended. I am the Primary Clinician of Record.    --------------------------------- IMPRESSION AND DISPOSITION ---------------------------------    IMPRESSION  1. Acute pain of right knee    2. Contusion of right knee, initial encounter    3.  Homeless        DISPOSITION  Disposition: Discharge   Patient condition is good      NOTE: This report was transcribed using voice recognition software.  Every effort was made to ensure accuracy; however, inadvertent computerized transcription errors may be present      NAN Doherty - CNP  01/28/23 [facility]:     Supervising Physician, on-site, available for consultation, non-participatory in the evaluation or care of this patient       Imtiaz Gleason MD  01/28/23 [id number]

## 2023-01-30 ENCOUNTER — HOSPITAL ENCOUNTER (EMERGENCY)
Age: 18
Discharge: HOME | End: 2023-01-31
Payer: COMMERCIAL

## 2023-01-30 VITALS
BODY MASS INDEX: 25.92 KG/M2 | SYSTOLIC BLOOD PRESSURE: 124 MMHG | HEIGHT: 69 IN | HEART RATE: 93 BPM | OXYGEN SATURATION: 100 % | DIASTOLIC BLOOD PRESSURE: 80 MMHG | TEMPERATURE: 98.7 F | RESPIRATION RATE: 18 BRPM | WEIGHT: 175 LBS

## 2023-01-30 DIAGNOSIS — B07.0 PLANTAR WART OF RIGHT FOOT: Primary | ICD-10-CM

## 2023-01-30 PROCEDURE — 99283 EMERGENCY DEPT VISIT LOW MDM: CPT

## 2023-01-30 ASSESSMENT — PAIN - FUNCTIONAL ASSESSMENT: PAIN_FUNCTIONAL_ASSESSMENT: 0-10

## 2023-01-30 ASSESSMENT — PAIN SCALES - GENERAL: PAINLEVEL_OUTOF10: 10

## 2023-01-31 ENCOUNTER — APPOINTMENT (OUTPATIENT)
Dept: GENERAL RADIOLOGY | Age: 18
End: 2023-01-31
Payer: COMMERCIAL

## 2023-01-31 ENCOUNTER — HOSPITAL ENCOUNTER (EMERGENCY)
Age: 18
Discharge: HOME | End: 2023-02-01
Attending: STUDENT IN AN ORGANIZED HEALTH CARE EDUCATION/TRAINING PROGRAM
Payer: COMMERCIAL

## 2023-01-31 DIAGNOSIS — F48.9 MENTAL HEALTH PROBLEM: ICD-10-CM

## 2023-01-31 DIAGNOSIS — M79.673 PAIN OF FOOT, UNSPECIFIED LATERALITY: Primary | ICD-10-CM

## 2023-01-31 LAB
ALBUMIN SERPL-MCNC: 4.9 G/DL (ref 3.5–5.2)
ALP BLD-CCNC: 87 U/L (ref 40–129)
ALT SERPL-CCNC: 12 U/L (ref 0–40)
ANION GAP SERPL CALCULATED.3IONS-SCNC: 11 MMOL/L (ref 7–16)
AST SERPL-CCNC: 20 U/L (ref 0–39)
BACTERIA: NORMAL /HPF
BASOPHILS ABSOLUTE: 0.05 E9/L (ref 0–0.2)
BASOPHILS RELATIVE PERCENT: 0.5 % (ref 0–2)
BILIRUB SERPL-MCNC: 0.3 MG/DL (ref 0–1.2)
BILIRUBIN URINE: NEGATIVE
BLOOD, URINE: NEGATIVE
BUN BLDV-MCNC: 11 MG/DL (ref 6–20)
CALCIUM SERPL-MCNC: 9.7 MG/DL (ref 8.6–10.2)
CHLORIDE BLD-SCNC: 100 MMOL/L (ref 98–107)
CLARITY: CLEAR
CO2: 29 MMOL/L (ref 22–29)
COLOR: YELLOW
CREAT SERPL-MCNC: 0.9 MG/DL (ref 0.7–1.2)
EOSINOPHILS ABSOLUTE: 0.93 E9/L (ref 0.05–0.5)
EOSINOPHILS RELATIVE PERCENT: 9.3 % (ref 0–6)
GFR SERPL CREATININE-BSD FRML MDRD: >60 ML/MIN/1.73
GLUCOSE BLD-MCNC: 88 MG/DL (ref 74–99)
GLUCOSE URINE: NEGATIVE MG/DL
HCT VFR BLD CALC: 51.1 % (ref 37–54)
HEMOGLOBIN: 17 G/DL (ref 12.5–16.5)
IMMATURE GRANULOCYTES #: 0.04 E9/L
IMMATURE GRANULOCYTES %: 0.4 % (ref 0–5)
INFLUENZA A: NOT DETECTED
INFLUENZA B: NOT DETECTED
KETONES, URINE: NEGATIVE MG/DL
LEUKOCYTE ESTERASE, URINE: NEGATIVE
LYMPHOCYTES ABSOLUTE: 3.09 E9/L (ref 1.5–4)
LYMPHOCYTES RELATIVE PERCENT: 31.1 % (ref 20–42)
MCH RBC QN AUTO: 29.5 PG (ref 26–35)
MCHC RBC AUTO-ENTMCNC: 33.3 % (ref 32–34.5)
MCV RBC AUTO: 88.6 FL (ref 80–99.9)
MONOCYTES ABSOLUTE: 0.6 E9/L (ref 0.1–0.95)
MONOCYTES RELATIVE PERCENT: 6 % (ref 2–12)
NEUTROPHILS ABSOLUTE: 5.24 E9/L (ref 1.8–7.3)
NEUTROPHILS RELATIVE PERCENT: 52.7 % (ref 43–80)
NITRITE, URINE: NEGATIVE
PDW BLD-RTO: 12.6 FL (ref 11.5–15)
PH UA: 7 (ref 5–9)
PLATELET # BLD: 236 E9/L (ref 130–450)
PMV BLD AUTO: 9.1 FL (ref 7–12)
POTASSIUM REFLEX MAGNESIUM: 3.8 MMOL/L (ref 3.5–5)
PROTEIN UA: NEGATIVE MG/DL
RBC # BLD: 5.77 E12/L (ref 3.8–5.8)
RBC UA: NORMAL /HPF (ref 0–2)
SARS-COV-2 RNA, RT PCR: NOT DETECTED
SODIUM BLD-SCNC: 140 MMOL/L (ref 132–146)
SPECIFIC GRAVITY UA: 1.01 (ref 1–1.03)
TOTAL PROTEIN: 7.6 G/DL (ref 6.4–8.3)
UROBILINOGEN, URINE: 1 E.U./DL
WBC # BLD: 10 E9/L (ref 4.5–11.5)
WBC UA: NORMAL /HPF (ref 0–5)

## 2023-01-31 PROCEDURE — 99285 EMERGENCY DEPT VISIT HI MDM: CPT

## 2023-01-31 PROCEDURE — 6370000000 HC RX 637 (ALT 250 FOR IP): Performed by: PHYSICIAN ASSISTANT

## 2023-01-31 PROCEDURE — 80179 DRUG ASSAY SALICYLATE: CPT

## 2023-01-31 PROCEDURE — 81001 URINALYSIS AUTO W/SCOPE: CPT

## 2023-01-31 PROCEDURE — 73610 X-RAY EXAM OF ANKLE: CPT

## 2023-01-31 PROCEDURE — 85025 COMPLETE CBC W/AUTO DIFF WBC: CPT

## 2023-01-31 PROCEDURE — 82077 ASSAY SPEC XCP UR&BREATH IA: CPT

## 2023-01-31 PROCEDURE — 80143 DRUG ASSAY ACETAMINOPHEN: CPT

## 2023-01-31 PROCEDURE — 87636 SARSCOV2 & INF A&B AMP PRB: CPT

## 2023-01-31 PROCEDURE — 80307 DRUG TEST PRSMV CHEM ANLYZR: CPT

## 2023-01-31 PROCEDURE — 36415 COLL VENOUS BLD VENIPUNCTURE: CPT

## 2023-01-31 PROCEDURE — 80053 COMPREHEN METABOLIC PANEL: CPT

## 2023-01-31 PROCEDURE — 73630 X-RAY EXAM OF FOOT: CPT

## 2023-01-31 RX ORDER — GINSENG 100 MG
CAPSULE ORAL ONCE
Status: COMPLETED | OUTPATIENT
Start: 2023-01-31 | End: 2023-01-31

## 2023-01-31 RX ADMIN — Medication 1 APPLICATION: at 01:11

## 2023-01-31 ASSESSMENT — PAIN - FUNCTIONAL ASSESSMENT
PAIN_FUNCTIONAL_ASSESSMENT: ACTIVITIES ARE NOT PREVENTED
PAIN_FUNCTIONAL_ASSESSMENT: ACTIVITIES ARE NOT PREVENTED
PAIN_FUNCTIONAL_ASSESSMENT: 0-10
PAIN_FUNCTIONAL_ASSESSMENT: 0-10

## 2023-01-31 ASSESSMENT — PAIN SCALES - GENERAL
PAINLEVEL_OUTOF10: 9
PAINLEVEL_OUTOF10: 9

## 2023-01-31 ASSESSMENT — ENCOUNTER SYMPTOMS
CHEST TIGHTNESS: 0
BACK PAIN: 0
ABDOMINAL DISTENTION: 0
NAUSEA: 0
PHOTOPHOBIA: 0
ABDOMINAL PAIN: 0
COUGH: 0
VOMITING: 0
SHORTNESS OF BREATH: 0
DIARRHEA: 0

## 2023-01-31 ASSESSMENT — PAIN DESCRIPTION - ORIENTATION
ORIENTATION: RIGHT
ORIENTATION: RIGHT

## 2023-01-31 ASSESSMENT — PAIN DESCRIPTION - PAIN TYPE
TYPE: ACUTE PAIN
TYPE: ACUTE PAIN

## 2023-01-31 ASSESSMENT — PAIN DESCRIPTION - ONSET
ONSET: ON-GOING
ONSET: ON-GOING

## 2023-01-31 ASSESSMENT — PAIN DESCRIPTION - FREQUENCY
FREQUENCY: CONTINUOUS
FREQUENCY: CONTINUOUS

## 2023-01-31 ASSESSMENT — PAIN DESCRIPTION - DESCRIPTORS: DESCRIPTORS: ACHING

## 2023-01-31 ASSESSMENT — PAIN DESCRIPTION - LOCATION
LOCATION: FOOT
LOCATION: FOOT

## 2023-01-31 NOTE — ED PROVIDER NOTES
Independent CHRISTOPHER Visit. [facility]  Department of Emergency Medicine   ED  Encounter Note  Admit Date/RoomTime: 2023 11:56 PM  ED Room: [facility] [address]    NAME: Sierra Boone  : 2005  MRN: [id number]     Chief Complaint:  Foot Pain (Patient states he has a callous on the bottom of his right foot, he was seen at [facility] a few days ago but lost the rx for the \"cream\" they wanted him to use\")    History of Present Illness        Sierra Boone is a 21 y.o. old male who presents to the emergency department by private vehicle, for evaluation of callous located on right foot, which occured a few day(s) prior to arrival.  Pt had a cream from another hospital which he lost. The wound is / has clean and dry. Tetanus Status:  up to date. ROS   Pertinent positives and negatives are stated within HPI, all other systems reviewed and are negative. Past Medical History:  has a past medical history of ADHD (attention deficit hyperactivity disorder), Autism disorder, GERD (gastroesophageal reflux disease), Hypotonia, Intermittent explosive disorder, Microcephalic ([address] and Schizoaffective disorder ([address] Surgical History:  has no past surgical history on file. Social History:  reports that he has been smoking cigarettes. He has a 6.00 pack-year smoking history. He has never used smokeless tobacco. He reports current alcohol use of about 15.0 standard drinks per week. He reports current drug use. Frequency: 7.00 times per week. Drug: Marijuana Jenet Darien). Family History: family history is not on file. Allergies: Strawberry    Physical Exam   Oxygen Saturation Interpretation: Normal.        ED Triage Vitals [23 2301]   BP Temp Temp Source Heart Rate Resp SpO2 Height Weight   124/80 98.7 °F (37.1 °C) Oral 93 18 100 % 5' 9\" (1.753 m) 175 lb (79.4 kg)         Physical Exam  Constitutional:  Alert, development consistent with age. HEENT:  NC/NT.   Airway patent.  Extremities: No tenderness or edema noted.  On the right foot overlying the approximate area of the base of the fifth metatarsal there is a plantars wart 1 cm.  Integument:  Normal turgor.  Warm, dry, without visible rash, unless noted elsewhere.  Lymphatics: No lymphangitis or adenopathy noted.  Neurological:  Oriented.  Motor functions intact.     Lab / Imaging Results   (All laboratory and radiology results have been personally reviewed by myself)  Labs:  No results found for this visit on 01/30/23.  Imaging:  All Radiology results interpreted by Radiologist unless otherwise noted.  No orders to display     ED Course / Medical Decision Making     Medications   bacitracin ointment (1 application Topical Given 1/31/23 0111)        Consult(s):   None    Procedure(s):   None    MDM:   Patient presents to emergency for a refill of cream that he was prescribed for a callus on his right foot.  Examination of the area on patient's foot shows that it is a plantars wart.  He is advised to follow-up with podiatry for removal of the wart.  Advised patient that no cream will really help the area however I did give him a tube of bacitracin and some gauze rolls at his request.  Patient is asking for crutches.  I advised patient that he does not need crutches for plantars wart he can walk on it.    Plan of Care/Counseling:  Gela Paez PA-C reviewed today's visit with the patient in addition to providing specific details for the plan of care and counseling regarding the diagnosis and prognosis.  Questions are answered at this time and are agreeable with the plan.    Assessment     1. Plantar wart of right foot      Plan   Discharged home.  Patient condition is good    New Medications     Discharge Medication List as of 1/31/2023 12:45 AM        Electronically signed by Gela Paez PA-C   DD: 1/31/23  **This report was transcribed using voice recognition software. Every effort was made to ensure accuracy; however,  inadvertent computerized transcription errors may be present.  END OF ED PROVIDER NOTE       Gela Paez PA-C  01/31/23 0319

## 2023-02-01 VITALS
RESPIRATION RATE: 16 BRPM | TEMPERATURE: 97.7 F | OXYGEN SATURATION: 99 % | SYSTOLIC BLOOD PRESSURE: 112 MMHG | BODY MASS INDEX: 25.84 KG/M2 | DIASTOLIC BLOOD PRESSURE: 56 MMHG | HEART RATE: 60 BPM | WEIGHT: 175 LBS

## 2023-02-01 LAB
ACETAMINOPHEN LEVEL: <5 MCG/ML (ref 10–30)
AMPHETAMINE SCREEN, URINE: NOT DETECTED
BARBITURATE SCREEN URINE: NOT DETECTED
BENZODIAZEPINE SCREEN, URINE: NOT DETECTED
CANNABINOID SCREEN URINE: NOT DETECTED
COCAINE METABOLITE SCREEN URINE: NOT DETECTED
EKG ATRIAL RATE: 64 BPM
EKG P AXIS: 54 DEGREES
EKG P-R INTERVAL: 138 MS
EKG Q-T INTERVAL: 390 MS
EKG QRS DURATION: 88 MS
EKG QTC CALCULATION (BAZETT): 402 MS
EKG R AXIS: 47 DEGREES
EKG T AXIS: 51 DEGREES
EKG VENTRICULAR RATE: 64 BPM
ETHANOL: <10 MG/DL (ref 0–0.08)
FENTANYL SCREEN, URINE: NOT DETECTED
Lab: NORMAL
METHADONE SCREEN, URINE: NOT DETECTED
OPIATE SCREEN URINE: NOT DETECTED
OXYCODONE URINE: NOT DETECTED
PHENCYCLIDINE SCREEN URINE: NOT DETECTED
SALICYLATE, SERUM: <0.3 MG/DL (ref 0–30)
TOTAL CK: 207 U/L (ref 20–200)
TRICYCLIC ANTIDEPRESSANTS SCREEN SERUM: NEGATIVE NG/ML
VALPROIC ACID LEVEL: 3 MCG/ML (ref 50–100)

## 2023-02-01 PROCEDURE — 93005 ELECTROCARDIOGRAM TRACING: CPT | Performed by: STUDENT IN AN ORGANIZED HEALTH CARE EDUCATION/TRAINING PROGRAM

## 2023-02-01 PROCEDURE — 80164 ASSAY DIPROPYLACETIC ACD TOT: CPT

## 2023-02-01 PROCEDURE — 36415 COLL VENOUS BLD VENIPUNCTURE: CPT

## 2023-02-01 PROCEDURE — 93010 ELECTROCARDIOGRAM REPORT: CPT | Performed by: INTERNAL MEDICINE

## 2023-02-01 PROCEDURE — 82550 ASSAY OF CK (CPK): CPT

## 2023-02-01 ASSESSMENT — PAIN - FUNCTIONAL ASSESSMENT: PAIN_FUNCTIONAL_ASSESSMENT: NONE - DENIES PAIN

## 2023-02-01 ASSESSMENT — LIFESTYLE VARIABLES
HOW MANY STANDARD DRINKS CONTAINING ALCOHOL DO YOU HAVE ON A TYPICAL DAY: PATIENT DOES NOT DRINK
HOW OFTEN DO YOU HAVE A DRINK CONTAINING ALCOHOL: MONTHLY OR LESS
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
## 2023-02-01 NOTE — ED NOTES
I have faxed and emailed pt's chart to [facility] 5 times - they are having connection difficulties    [facility] so [facility] can review for [facility]    Awaiting call back     MURRAY Sheridan  02/01/23 6930
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
## 2023-02-01 NOTE — ED NOTES
FAX AND EMAILED FAILED AGAIN    I CALLED AND SPOKE TO THE CSU DIRECTOR Darek Rainey, REVIEWED CHART AND PT WAS ACCEPTED     CALLED [facility] AND ARRANGED TRANSPORT TO [facility]    [facility] ([phone]) WILL CALL PT'S NURSE WHEN THEY ARRIVE - TICKET NUMBER #[phone] FOR TRANSPORT         Deni Amos, [place]  02/01/23 0959

## 2023-02-01 NOTE — ED NOTES
Patient awakened and notified that a  will be calling on the IPAD to speak to him via telehealth. Signed consent which was faxed to [address] [place] [address] with face sheet.       Nidia Das RN  02/01/23 0433

## 2023-02-01 NOTE — ED NOTES
Pt on computer pad talking with social work personal      Roland Borja [place]  02/01/23 9842

## 2023-02-01 NOTE — ED PROVIDER NOTES
12:51 PM EST    I received this patient at sign out from Dr. Josef Goel   I have discussed the patient's initial exam, treatment, and plan of care with the out going physician. Time he denies any homicidal ideation or suicidal ideation. He appears well, nontoxic, tolerated a meal here and is ambulatory in the ER. He is requesting to go to the crisis unit. Social work consulted for this. He poses no risk to himself or others at this time. --------------------------------- IMPRESSION AND DISPOSITION ---------------------------------    IMPRESSION  1. Pain of foot, unspecified laterality    2.  Mental health problem        DISPOSITION  Disposition: Discharge to crisis unit  Patient condition is stable       Lady Powers, DO  02/01/23 45 [facility], DO  02/01/23 2026
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
## 2023-02-01 NOTE — ED NOTES
[address] and spoke with Paco Shah,  who requests face sheet and consent for telehealth be faxed to behavioral health and patient will be placed in the queue     [facility],[address], [facility]  02/01/23 4396

## 2023-02-01 NOTE — ED NOTES
All personal belongings in pt hands. Received call from [employer] who states that they are on there way. Pt denying suicidal or homicidal thoughts.  Pt informed how happy he is that he is going to [place], RN  02/01/23 1442

## 2023-02-01 NOTE — ED NOTES
Hand off received from previous rn. Call placed to social work personal and awaiting returned call. Pt remains sleeping in room and chest rises and falls.       Salvador Aase, RN  02/01/23 3155

## 2023-03-09 ENCOUNTER — APPOINTMENT (OUTPATIENT)
Dept: GENERAL RADIOLOGY | Age: 18
End: 2023-03-09
Payer: COMMERCIAL

## 2023-03-09 ENCOUNTER — HOSPITAL ENCOUNTER (EMERGENCY)
Age: 18
Discharge: HOME | End: 2023-03-09
Attending: EMERGENCY MEDICINE
Payer: COMMERCIAL

## 2023-03-09 VITALS
DIASTOLIC BLOOD PRESSURE: 74 MMHG | OXYGEN SATURATION: 100 % | TEMPERATURE: 98.2 F | SYSTOLIC BLOOD PRESSURE: 124 MMHG | BODY MASS INDEX: 25.84 KG/M2 | HEART RATE: 84 BPM | WEIGHT: 175 LBS | RESPIRATION RATE: 16 BRPM

## 2023-03-09 DIAGNOSIS — M25.562 ACUTE PAIN OF LEFT KNEE: Primary | ICD-10-CM

## 2023-03-09 PROCEDURE — 99283 EMERGENCY DEPT VISIT LOW MDM: CPT

## 2023-03-09 PROCEDURE — 6370000000 HC RX 637 (ALT 250 FOR IP): Performed by: EMERGENCY MEDICINE

## 2023-03-09 PROCEDURE — 73562 X-RAY EXAM OF KNEE 3: CPT

## 2023-03-09 RX ORDER — IBUPROFEN 400 MG/1
400 TABLET ORAL ONCE
Status: COMPLETED | OUTPATIENT
Start: 2023-03-09 | End: 2023-03-09

## 2023-03-09 RX ADMIN — IBUPROFEN 400 MG: 400 TABLET ORAL at 22:02

## 2023-03-09 ASSESSMENT — PAIN DESCRIPTION - LOCATION: LOCATION: KNEE

## 2023-03-09 ASSESSMENT — PAIN - FUNCTIONAL ASSESSMENT: PAIN_FUNCTIONAL_ASSESSMENT: 0-10

## 2023-03-09 ASSESSMENT — PAIN DESCRIPTION - ORIENTATION: ORIENTATION: RIGHT

## 2023-03-09 ASSESSMENT — PAIN SCALES - GENERAL: PAINLEVEL_OUTOF10: 10
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
## 2023-05-24 ENCOUNTER — HOSPITAL ENCOUNTER (EMERGENCY)
Age: 18
Discharge: LEFT BEFORE DISCHARGE | End: 2023-05-24

## 2023-05-25 ENCOUNTER — HOSPITAL ENCOUNTER (EMERGENCY)
Age: 18
Discharge: HOME | End: 2023-05-25
Attending: EMERGENCY MEDICINE
Payer: COMMERCIAL

## 2023-05-25 VITALS
BODY MASS INDEX: 21.22 KG/M2 | HEIGHT: 68 IN | DIASTOLIC BLOOD PRESSURE: 70 MMHG | WEIGHT: 140 LBS | RESPIRATION RATE: 16 BRPM | TEMPERATURE: 98 F | HEART RATE: 80 BPM | SYSTOLIC BLOOD PRESSURE: 115 MMHG | OXYGEN SATURATION: 98 %

## 2023-05-25 DIAGNOSIS — F39 MOOD DISORDER (HCC): Primary | ICD-10-CM

## 2023-05-25 LAB
ALBUMIN SERPL-MCNC: 4.7 G/DL (ref 3.5–5.2)
ALP SERPL-CCNC: 67 U/L (ref 40–129)
ALT SERPL-CCNC: 7 U/L (ref 0–40)
AMPHET UR QL SCN: NOT DETECTED
ANION GAP SERPL CALCULATED.3IONS-SCNC: 11 MMOL/L (ref 7–16)
APAP SERPL-MCNC: <5 MCG/ML (ref 10–30)
AST SERPL-CCNC: 14 U/L (ref 0–39)
BARBITURATES UR QL SCN: NOT DETECTED
BASOPHILS # BLD: 0.05 E9/L (ref 0–0.2)
BASOPHILS NFR BLD: 0.6 % (ref 0–2)
BENZODIAZ UR QL SCN: NOT DETECTED
BILIRUB SERPL-MCNC: 0.5 MG/DL (ref 0–1.2)
BUN SERPL-MCNC: 11 MG/DL (ref 6–20)
CALCIUM SERPL-MCNC: 9.5 MG/DL (ref 8.6–10.2)
CANNABINOIDS UR QL SCN: NOT DETECTED
CHLORIDE SERPL-SCNC: 98 MMOL/L (ref 98–107)
CO2 SERPL-SCNC: 29 MMOL/L (ref 22–29)
COCAINE UR QL SCN: NOT DETECTED
CREAT SERPL-MCNC: 0.7 MG/DL (ref 0.7–1.2)
DRUG SCREEN COMMENT UR-IMP: NORMAL
EKG ATRIAL RATE: 75 BPM
EKG P AXIS: 68 DEGREES
EKG P-R INTERVAL: 140 MS
EKG Q-T INTERVAL: 358 MS
EKG QRS DURATION: 74 MS
EKG QTC CALCULATION (BAZETT): 399 MS
EKG R AXIS: 59 DEGREES
EKG T AXIS: 44 DEGREES
EKG VENTRICULAR RATE: 75 BPM
EOSINOPHIL # BLD: 0.37 E9/L (ref 0.05–0.5)
EOSINOPHIL NFR BLD: 4.7 % (ref 0–6)
ERYTHROCYTE [DISTWIDTH] IN BLOOD BY AUTOMATED COUNT: 13.3 FL (ref 11.5–15)
ETHANOLAMINE SERPL-MCNC: <10 MG/DL (ref 0–0.08)
FENTANYL SCREEN, URINE: NOT DETECTED
GLUCOSE SERPL-MCNC: 91 MG/DL (ref 74–99)
HCT VFR BLD AUTO: 50.2 % (ref 37–54)
HGB BLD-MCNC: 16.8 G/DL (ref 12.5–16.5)
IMM GRANULOCYTES # BLD: 0.05 E9/L
IMM GRANULOCYTES NFR BLD: 0.6 % (ref 0–5)
LYMPHOCYTES # BLD: 2.22 E9/L (ref 1.5–4)
LYMPHOCYTES NFR BLD: 28.1 % (ref 20–42)
MCH RBC QN AUTO: 29.5 PG (ref 26–35)
MCHC RBC AUTO-ENTMCNC: 33.5 % (ref 32–34.5)
MCV RBC AUTO: 88.1 FL (ref 80–99.9)
METHADONE UR QL SCN: NOT DETECTED
MONOCYTES # BLD: 0.64 E9/L (ref 0.1–0.95)
MONOCYTES NFR BLD: 8.1 % (ref 2–12)
NEUTROPHILS # BLD: 4.56 E9/L (ref 1.8–7.3)
NEUTS SEG NFR BLD: 57.9 % (ref 43–80)
OPIATES UR QL SCN: NOT DETECTED
OXYCODONE URINE: NOT DETECTED
PCP UR QL SCN: NOT DETECTED
PLATELET # BLD AUTO: 163 E9/L (ref 130–450)
PMV BLD AUTO: 8.5 FL (ref 7–12)
POTASSIUM SERPL-SCNC: 3.5 MMOL/L (ref 3.5–5)
PROT SERPL-MCNC: 7 G/DL (ref 6.4–8.3)
RBC # BLD AUTO: 5.7 E12/L (ref 3.8–5.8)
SALICYLATES SERPL-MCNC: <0.3 MG/DL (ref 0–30)
SODIUM SERPL-SCNC: 138 MMOL/L (ref 132–146)
TRICYCLIC ANTIDEPRESSANTS SCREEN SERUM: NEGATIVE NG/ML
WBC # BLD: 7.9 E9/L (ref 4.5–11.5)

## 2023-05-25 PROCEDURE — 80307 DRUG TEST PRSMV CHEM ANLYZR: CPT

## 2023-05-25 PROCEDURE — 80053 COMPREHEN METABOLIC PANEL: CPT

## 2023-05-25 PROCEDURE — 99284 EMERGENCY DEPT VISIT MOD MDM: CPT

## 2023-05-25 PROCEDURE — 82077 ASSAY SPEC XCP UR&BREATH IA: CPT

## 2023-05-25 PROCEDURE — 80179 DRUG ASSAY SALICYLATE: CPT

## 2023-05-25 PROCEDURE — 85025 COMPLETE CBC W/AUTO DIFF WBC: CPT

## 2023-05-25 PROCEDURE — 6370000000 HC RX 637 (ALT 250 FOR IP): Performed by: EMERGENCY MEDICINE

## 2023-05-25 PROCEDURE — 93010 ELECTROCARDIOGRAM REPORT: CPT | Performed by: INTERNAL MEDICINE

## 2023-05-25 PROCEDURE — 93005 ELECTROCARDIOGRAM TRACING: CPT | Performed by: EMERGENCY MEDICINE

## 2023-05-25 PROCEDURE — 80143 DRUG ASSAY ACETAMINOPHEN: CPT

## 2023-05-25 RX ORDER — NICOTINE 21 MG/24HR
1 PATCH, TRANSDERMAL 24 HOURS TRANSDERMAL ONCE
Status: DISCONTINUED | OUTPATIENT
Start: 2023-05-25 | End: 2023-05-25 | Stop reason: HOSPADM

## 2023-05-25 ASSESSMENT — LIFESTYLE VARIABLES
HOW MANY STANDARD DRINKS CONTAINING ALCOHOL DO YOU HAVE ON A TYPICAL DAY: PATIENT DOES NOT DRINK
HOW OFTEN DO YOU HAVE A DRINK CONTAINING ALCOHOL: NEVER

## 2023-05-25 ASSESSMENT — PAIN - FUNCTIONAL ASSESSMENT: PAIN_FUNCTIONAL_ASSESSMENT: NONE - DENIES PAIN

## 2023-05-25 ASSESSMENT — PATIENT HEALTH QUESTIONNAIRE - PHQ9: SUM OF ALL RESPONSES TO PHQ QUESTIONS 1-9: 2

## 2023-05-25 NOTE — ED NOTES
1 patient belongings bag labeled and locked in locker [address]  05/25/23 7545
Breakfast tray given.      Bola Clement RN  05/25/23 5997
Called CSU and spoke to Rajesh Enamorado (who has been in a meeting for the past couple of hours)    Pt is accepted to [facility] transport through [facility]  05/25/23 5114
Consumed care of pt. Pt resting comfortably in bed. CO and family member at the bedside.       Geovany Hernandes RN  05/25/23 5505
Fax to nursing office for CO.  Notified Rody Connolly in staffing     Stefanie Souza RN  05/25/23 0739
JUAN met with ashly Jones while she was in the hospital meeting with the pt. Lionel Jones [phone]. Grandma stated that pt is diagnosed with schizophrenia, has a history of VH, ADHD, bipolar and Asperger disorder. Grandma stated that the pt is very defiant and does not take any responsibility for his own actions. Grandma stated that pt was in alf for 2 months for aggravated menacing and he was not on any MH medications. Grandma stated that the pt was on a BONILLA Risperdal and Depakote but she is unsure the last time the pt was on medications. Grandma reported that the pt was trying to pay someone $500 to kill his ex boyfriend and that is the reasoning he was in alf for 2 months and was just released 2 days ago. Grandma stated that the pt was active with [facility] but he was discharged from their services due to non-compliance and pt ws taking pictures with guns and when grandma showed pt's counselor they stated that they are unable to help the pt and he is a risk to himself and others and discharged him from services. Grandma stated that the pt does not have any children and pt has very unrealistic thinking. Grandma stated that the pt only has one sister and she is alive and pt has been telling people that the sister is . Grandma reported that pt was at the crisis unit 4 months ago. Selene Leander feels that pt needs to be monitored and ensure that they pt is taking his medications. Grandma stated that at this moment the pt is not a risk to himself or others but pt gets agitated easily. When pt makes threats to kill people it is never towards anyone in specific and it could be towards who ever upset him. Grandma stated that the pt does not have a brother but he does have a friend [place] who has access to guns. Grandma stated that the was court ordered for [facility] treatment. Grandma stated that it is always the same patterns with the pt and nothing ever changes. Grandma stated that the pt is attention seeking.  Grandma
PT HAS BEEN REFERRED TO [address] [place] [address] A Arvind Yee, LSW  05/25/23 5976
Pt agitated because \"he's been sitting here for 5 fucking hours to get to the crisis canter\" This RN offered the pt something to eat, drink and a pillow. Pt declined and asked for a nicotine patch. Dr. Washington Orourke notified. Awaiting orders.       Ana Paula Salinas RN  05/25/23 9489
Pt becoming agitated stating \"I almost got shot yesterday and that coco sparrow doesn't believe me. I can tell you one thing that when I get out of here I am still going to carry my gun and if someone tries to shoot me they're getting 30 rounds. I never go down.  I'll kill them mother amarjitkers\"     Pili Miranda, BETINA  05/25/23 4374
Pt given a dinner tray     Marcy De Jesus  05/25/23 8564
Pt sitting upright in bed. Talking with CO, in control, calm, cooperative. Safety breakfast tray ordered for pt.       Jennifer Marroquin RN  05/25/23 0322
Pt's grandma called in and reported that she is active on pt's my chart and read that the  documented that she is not willing to help him obtain an ID.   She wants it charted that she is willing to help him     Becky Melendez, MURRAY  05/25/23 6512
Report given to Alexx weiner RN.      Nelly Gutierres RN  05/25/23 9963
Wander guard # 3 placed on pt     Nazia Collazo RN  05/25/23 1873
past hallucinations. Pt reported to the use of marijuana and stated that he last used 2 months ago prior to going to detention. Pt stated that his sleep has been normal and his appetite has been good. Pt stated that he currently has no income and he would like to apply for SSI. Pt stated that he was in detention for aggravated menacing charges and it was \"some random kid. \" Pt stated that this person currently has a protection order on him. Pt stated that he was released from detention 2 days ago. Pt stated that he has 2 children daughter Rohit Carrillo who just turned 3year old and Eloy Austin who is 5. Children are currently with the pt's godmother. Pt stated that his main support system is his cousin and he is homeless. Pt stated that he does have guns at his brothers home. Pt stated that he wants to go to the [address] [place], [place] [address] Unit and follow up with [facility] for [facility] health services. Collateral Information: to be obtained from grandmother. Risk Factors: legal history  Marijuana use  Protection order  Homeless  No income  No MH medications currently   Access to guns  Lack of self care  Previous hospital admissions  Recent loss of friend    Protective Factors: children to care for  Future focused  Help seeking behaviors  Supportive cousin     Suicidal Ideations:  [] Reports:    [] Past [] Present   [x] Denies    Suicide Attempts:  [] Reports:   [x] Denies    C-SSRS Screening Completed by RN: Current Suicide Risk:  [] No Risk [x] Low [] Moderate [] High    Homicidal Ideations:  [] Reports:   [] Past [] Present   [x] Denies     Self Injurious/Self Mutilation Behaviors:  [] Reports:    [] Past [] Present   [x] Denies    Hallucinations/Delusions:  [] Reports:   [x] Denies     Substance Use/Alcohol Use/Addiction:  [x] Reports: marijuana   [] Denies   [x] SBIRT Screen Complete.      Current or Past Substance Abuse Treatment:  [] Yes, When and Where:  [x] No    Current or Past Mental Health Treatment:  [x] Yes, When

## 2023-05-25 NOTE — ED PROVIDER NOTES
75  Rhythm: Sinus  Interpretation: no acute changes  Comparison: stable as compared to patient's most recent EKG 2/1/23      ------------------------- NURSING NOTES AND VITALS REVIEWED ---------------------------   The nursing notes within the ED encounter and vital signs as below have been reviewed by myself. BP (!) 154/87   Pulse 87   Temp 98 °F (36.7 °C)   Resp 12   Ht 5' 8\" (1.727 m)   Wt 140 lb (63.5 kg)   SpO2 97%   BMI 21.29 kg/m²   Oxygen Saturation Interpretation: Normal    The patients available past medical records and past encounters were reviewed. ------------------------------ ED COURSE/MEDICAL DECISION MAKING----------------------  Medications - No data to display          Medical Decision Making:      History From:        Rachel Abdalla is a 19 y.o. male history of ADHD autism history of acid reflux history of schizoaffective disorder presenting to the ED for psychiatric evaluation, beginning days ago. The complaint has been persistent, moderate in severity, and worsened by emotional upset. Patient reporting feeling depressed and suicidal.  Patient reports he has been feeling this way since his sister passed away. Patient reporting no active plan. He has attempted in the past by jumping off balcony. Patient reporting no thoughts of harming others he reports no hallucinations. Patient reporting no fever no chills no chest pain or abdominal pain he reports no headache he reports no weakness or dizziness. There is no history of cough   CC/HPI Summary, DDx, ED Course, Reassessment, Tests Considered, Patient expectation:        Rachel Abdalla is a 19 y.o. male history of ADHD autism history of acid reflux history of schizoaffective disorder presenting to the ED for psychiatric evaluation, beginning days ago. The complaint has been persistent, moderate in severity, and worsened by emotional upset.   Patient reporting feeling depressed and suicidal.  Patient reports he has

## 2023-05-25 NOTE — ED NOTES
Patient stated that he got out of [facility] yesterday 5/23/23 per a court order due to charges patient has to seek mental health treatment to avoid going back to [facility]. Patient stated he is not suicidal, he does not want to hurt anyone or himself. Patient stated he just does not want to go back to [facility] and trying to do everything the right way. Patient did not want to wait any longer and stated he was going to have his cousin come get him and go to a different hospital. Patient did sign an ED withdraw form and was seen getting into a vehicle to leave.        Claudia Held  05/24/23 2041

## 2023-05-25 NOTE — DISCHARGE INSTRUCTIONS
Go directly to [address] [place] [address] [phone] or [phone] or [phone]   24 hour crisis line for the following [address]. Alisia Arellano    PEER WARM LINE: [phone]  Monday through Friday 4pm to 8pm and Saturday 1pm-3pm   You can call during these times to talk with a peer support person as needed

## 2023-09-17 ENCOUNTER — HOSPITAL ENCOUNTER (EMERGENCY)
Age: 18
Discharge: LEFT BEFORE DISCHARGE | End: 2023-09-17
Payer: COMMERCIAL

## 2023-09-17 VITALS — TEMPERATURE: 97.8 F | HEART RATE: 90 BPM | OXYGEN SATURATION: 98 %

## 2023-09-17 DIAGNOSIS — M25.571 ACUTE RIGHT ANKLE PAIN: Primary | ICD-10-CM

## 2023-09-17 PROCEDURE — 99283 EMERGENCY DEPT VISIT LOW MDM: CPT

## 2023-09-18 ENCOUNTER — HOSPITAL ENCOUNTER (EMERGENCY)
Age: 18
Discharge: HOME | End: 2023-09-18
Payer: COMMERCIAL

## 2023-09-18 ENCOUNTER — APPOINTMENT (OUTPATIENT)
Dept: GENERAL RADIOLOGY | Age: 18
End: 2023-09-18
Payer: COMMERCIAL

## 2023-09-18 VITALS
RESPIRATION RATE: 16 BRPM | HEART RATE: 91 BPM | TEMPERATURE: 97.5 F | SYSTOLIC BLOOD PRESSURE: 110 MMHG | OXYGEN SATURATION: 97 % | DIASTOLIC BLOOD PRESSURE: 63 MMHG

## 2023-09-18 DIAGNOSIS — M25.561 ACUTE PAIN OF RIGHT KNEE: Primary | ICD-10-CM

## 2023-09-18 DIAGNOSIS — M25.571 ACUTE RIGHT ANKLE PAIN: ICD-10-CM

## 2023-09-18 PROCEDURE — 99283 EMERGENCY DEPT VISIT LOW MDM: CPT

## 2023-09-18 PROCEDURE — 73564 X-RAY EXAM KNEE 4 OR MORE: CPT

## 2023-09-18 PROCEDURE — 73610 X-RAY EXAM OF ANKLE: CPT

## 2023-09-18 ASSESSMENT — LIFESTYLE VARIABLES
HOW OFTEN DO YOU HAVE A DRINK CONTAINING ALCOHOL: MONTHLY OR LESS
HOW MANY STANDARD DRINKS CONTAINING ALCOHOL DO YOU HAVE ON A TYPICAL DAY: 1 OR 2

## 2023-09-18 NOTE — ED PROVIDER NOTES
Independent CHRISTOPHER Visit. [facility]  ED  Encounter Note  Admit Date/RoomTime: 2023  2:08 AM  ED Room: NLOCV78/A6  NAME: Sunshine Branch  : 2005  MRN: [id number]  PCP: No primary care provider on file. CHIEF COMPLAINT     Knee Pain (Pt left AMA about 2 hours ago for twisting his left ankle in a pot hole. Pt came back stating his left knee is now more swollen and would like to be seen. )    HISTORY OF PRESENT ILLNESS        Sunshine Branch is a 19 y.o. male who presents to the ED via private vehicle with complaint of right ankle pain. Injured in a pothole earlier today presented to the emergency department and then eloped prior to any imaging being done. States that pain is now radiating into his knee. Has not tried any over-the-counter analgesics. REVIEW OF SYSTEMS     Pertinent positives and negatives are stated within HPI, all other systems reviewed and are negative. Past Medical History:  has a past medical history of ADHD (attention deficit hyperactivity disorder), Autism disorder, GERD (gastroesophageal reflux disease), Hypotonia, Intermittent explosive disorder, Microcephalic ([address]), and Schizoaffective disorder ([address] Surgical History:  has no past surgical history on file. Social History:  reports that he has been smoking cigarettes. He has a 6.00 pack-year smoking history. He has never used smokeless tobacco. He reports current alcohol use of about 15.0 standard drinks of alcohol per week. He reports current drug use. Frequency: 7.00 times per week. Drug: Marijuana Kreg Sjogren). Family History: family history is not on file.    Allergies: Strawberry  CURRENT MEDICATIONS       Previous Medications    MELATONIN 3 MG TABS TABLET    Take 1 tablet by mouth nightly       SCREENINGS     [place] Coma Scale  Eye Opening: Spontaneous  Best Verbal Response: Oriented  Best Motor Response: Obeys commands  [place] Coma Scale Score: 15

## 2023-09-23 ENCOUNTER — APPOINTMENT (OUTPATIENT)
Dept: GENERAL RADIOLOGY | Age: 18
End: 2023-09-23
Payer: COMMERCIAL

## 2023-09-23 ENCOUNTER — HOSPITAL ENCOUNTER (EMERGENCY)
Age: 18
Discharge: HOME | End: 2023-09-23
Attending: EMERGENCY MEDICINE
Payer: COMMERCIAL

## 2023-09-23 VITALS
TEMPERATURE: 98.2 F | RESPIRATION RATE: 16 BRPM | OXYGEN SATURATION: 98 % | DIASTOLIC BLOOD PRESSURE: 64 MMHG | HEART RATE: 93 BPM | HEIGHT: 68 IN | BODY MASS INDEX: 21.98 KG/M2 | WEIGHT: 145 LBS | SYSTOLIC BLOOD PRESSURE: 109 MMHG

## 2023-09-23 DIAGNOSIS — M79.604 RIGHT LEG PAIN: Primary | ICD-10-CM

## 2023-09-23 PROCEDURE — 99283 EMERGENCY DEPT VISIT LOW MDM: CPT

## 2023-09-23 PROCEDURE — 73590 X-RAY EXAM OF LOWER LEG: CPT

## 2023-09-23 PROCEDURE — 6370000000 HC RX 637 (ALT 250 FOR IP): Performed by: EMERGENCY MEDICINE

## 2023-09-23 RX ORDER — ACETAMINOPHEN 325 MG/1
650 TABLET ORAL ONCE
Status: COMPLETED | OUTPATIENT
Start: 2023-09-23 | End: 2023-09-23

## 2023-09-23 RX ADMIN — ACETAMINOPHEN 650 MG: 325 TABLET ORAL at 03:38

## 2023-09-23 ASSESSMENT — PAIN DESCRIPTION - DESCRIPTORS
DESCRIPTORS: ACHING
DESCRIPTORS: GNAWING

## 2023-09-23 ASSESSMENT — ENCOUNTER SYMPTOMS
NAUSEA: 0
SORE THROAT: 0
SHORTNESS OF BREATH: 0
SINUS PRESSURE: 0
VOMITING: 0
ABDOMINAL PAIN: 0
DIARRHEA: 0
WHEEZING: 0
COUGH: 0
BACK PAIN: 0
EYE PAIN: 0
EYE REDNESS: 0
EYE DISCHARGE: 0

## 2023-09-23 ASSESSMENT — PAIN SCALES - GENERAL
PAINLEVEL_OUTOF10: 10
PAINLEVEL_OUTOF10: 8

## 2023-09-23 ASSESSMENT — PAIN DESCRIPTION - ORIENTATION
ORIENTATION: RIGHT
ORIENTATION: RIGHT

## 2023-09-23 ASSESSMENT — PAIN - FUNCTIONAL ASSESSMENT: PAIN_FUNCTIONAL_ASSESSMENT: 0-10

## 2023-09-23 ASSESSMENT — PAIN DESCRIPTION - LOCATION
LOCATION: LEG
LOCATION: LEG

## 2023-09-24 ENCOUNTER — APPOINTMENT (OUTPATIENT)
Dept: GENERAL RADIOLOGY | Age: 18
End: 2023-09-24
Payer: COMMERCIAL

## 2023-09-24 ENCOUNTER — HOSPITAL ENCOUNTER (EMERGENCY)
Age: 18
Discharge: HOME | End: 2023-09-24
Attending: EMERGENCY MEDICINE
Payer: COMMERCIAL

## 2023-09-24 VITALS
TEMPERATURE: 97.4 F | HEART RATE: 82 BPM | OXYGEN SATURATION: 97 % | SYSTOLIC BLOOD PRESSURE: 132 MMHG | DIASTOLIC BLOOD PRESSURE: 87 MMHG | RESPIRATION RATE: 18 BRPM

## 2023-09-24 DIAGNOSIS — S83.402A SPRAIN OF COLLATERAL LIGAMENT OF LEFT KNEE, INITIAL ENCOUNTER: Primary | ICD-10-CM

## 2023-09-24 PROCEDURE — 99283 EMERGENCY DEPT VISIT LOW MDM: CPT

## 2023-09-24 PROCEDURE — 73560 X-RAY EXAM OF KNEE 1 OR 2: CPT

## 2023-09-24 PROCEDURE — 6370000000 HC RX 637 (ALT 250 FOR IP): Performed by: EMERGENCY MEDICINE

## 2023-09-24 RX ORDER — IBUPROFEN 800 MG/1
800 TABLET ORAL ONCE
Status: COMPLETED | OUTPATIENT
Start: 2023-09-24 | End: 2023-09-24

## 2023-09-24 RX ADMIN — IBUPROFEN 800 MG: 800 TABLET, FILM COATED ORAL at 02:41

## 2023-09-24 ASSESSMENT — PAIN DESCRIPTION - FREQUENCY: FREQUENCY: CONTINUOUS

## 2023-09-24 ASSESSMENT — PAIN DESCRIPTION - ONSET: ONSET: ON-GOING

## 2023-09-24 ASSESSMENT — PAIN DESCRIPTION - PAIN TYPE: TYPE: ACUTE PAIN

## 2023-09-24 ASSESSMENT — PAIN - FUNCTIONAL ASSESSMENT
PAIN_FUNCTIONAL_ASSESSMENT: NONE - DENIES PAIN
PAIN_FUNCTIONAL_ASSESSMENT: 0-10
PAIN_FUNCTIONAL_ASSESSMENT: ACTIVITIES ARE NOT PREVENTED

## 2023-09-24 ASSESSMENT — PAIN SCALES - GENERAL: PAINLEVEL_OUTOF10: 9

## 2023-09-24 ASSESSMENT — PAIN DESCRIPTION - DESCRIPTORS: DESCRIPTORS: ACHING;DULL;SORE

## 2023-09-24 ASSESSMENT — PAIN DESCRIPTION - ORIENTATION: ORIENTATION: LEFT

## 2023-09-24 ASSESSMENT — PAIN DESCRIPTION - LOCATION: LOCATION: KNEE

## 2023-09-24 NOTE — ED NOTES
Patient to take ace bandage home since will not fit under jeans and plans to take a bus home so does not want to wear [address]69 Len Funes, RN  09/24/23 3332

## 2023-10-02 ENCOUNTER — APPOINTMENT (OUTPATIENT)
Dept: GENERAL RADIOLOGY | Age: 18
End: 2023-10-02
Payer: COMMERCIAL

## 2023-10-02 ENCOUNTER — HOSPITAL ENCOUNTER (EMERGENCY)
Age: 18
Discharge: HOME | End: 2023-10-03
Attending: EMERGENCY MEDICINE
Payer: COMMERCIAL

## 2023-10-02 VITALS
RESPIRATION RATE: 16 BRPM | DIASTOLIC BLOOD PRESSURE: 82 MMHG | OXYGEN SATURATION: 98 % | TEMPERATURE: 97.7 F | SYSTOLIC BLOOD PRESSURE: 130 MMHG | HEART RATE: 90 BPM

## 2023-10-02 DIAGNOSIS — S93.401A MODERATE RIGHT ANKLE SPRAIN, INITIAL ENCOUNTER: Primary | ICD-10-CM

## 2023-10-02 PROCEDURE — 73610 X-RAY EXAM OF ANKLE: CPT

## 2023-10-02 PROCEDURE — 99283 EMERGENCY DEPT VISIT LOW MDM: CPT

## 2023-10-02 PROCEDURE — 6370000000 HC RX 637 (ALT 250 FOR IP): Performed by: EMERGENCY MEDICINE

## 2023-10-02 RX ORDER — IBUPROFEN 600 MG/1
600 TABLET ORAL EVERY 8 HOURS PRN
Qty: 12 TABLET | Refills: 0 | Status: SHIPPED | OUTPATIENT
Start: 2023-10-02 | End: 2023-10-07

## 2023-10-02 RX ORDER — IBUPROFEN 600 MG/1
600 TABLET ORAL ONCE
Status: COMPLETED | OUTPATIENT
Start: 2023-10-03 | End: 2023-10-02

## 2023-10-02 RX ORDER — KETOROLAC TROMETHAMINE 30 MG/ML
30 INJECTION, SOLUTION INTRAMUSCULAR; INTRAVENOUS ONCE
Status: DISCONTINUED | OUTPATIENT
Start: 2023-10-02 | End: 2023-10-02

## 2023-10-02 RX ADMIN — IBUPROFEN 600 MG: 600 TABLET ORAL at 23:59

## 2023-10-02 ASSESSMENT — PAIN DESCRIPTION - ORIENTATION
ORIENTATION: RIGHT
ORIENTATION: RIGHT

## 2023-10-02 ASSESSMENT — PATIENT HEALTH QUESTIONNAIRE - PHQ9
2. FEELING DOWN, DEPRESSED OR HOPELESS: 0
1. LITTLE INTEREST OR PLEASURE IN DOING THINGS: 0
SUM OF ALL RESPONSES TO PHQ9 QUESTIONS 1 & 2: 0
SUM OF ALL RESPONSES TO PHQ QUESTIONS 1-9: 0

## 2023-10-02 ASSESSMENT — PAIN SCALES - GENERAL
PAINLEVEL_OUTOF10: 10
PAINLEVEL_OUTOF10: 10

## 2023-10-02 ASSESSMENT — PAIN - FUNCTIONAL ASSESSMENT: PAIN_FUNCTIONAL_ASSESSMENT: 0-10

## 2023-10-02 ASSESSMENT — PAIN DESCRIPTION - LOCATION
LOCATION: ANKLE
LOCATION: ANKLE

## 2023-10-02 ASSESSMENT — PAIN DESCRIPTION - DESCRIPTORS: DESCRIPTORS: ACHING;SORE;TENDER;DISCOMFORT

## 2023-10-25 ENCOUNTER — APPOINTMENT (OUTPATIENT)
Dept: GENERAL RADIOLOGY | Age: 18
End: 2023-10-25
Payer: COMMERCIAL

## 2023-10-25 ENCOUNTER — HOSPITAL ENCOUNTER (EMERGENCY)
Age: 18
Discharge: HOME | End: 2023-10-25
Payer: COMMERCIAL

## 2023-10-25 VITALS
RESPIRATION RATE: 18 BRPM | OXYGEN SATURATION: 100 % | DIASTOLIC BLOOD PRESSURE: 70 MMHG | SYSTOLIC BLOOD PRESSURE: 114 MMHG | HEART RATE: 95 BPM | BODY MASS INDEX: 21.29 KG/M2 | WEIGHT: 140 LBS | TEMPERATURE: 97.3 F

## 2023-10-25 DIAGNOSIS — M25.561 ACUTE PAIN OF RIGHT KNEE: Primary | ICD-10-CM

## 2023-10-25 PROCEDURE — 73564 X-RAY EXAM KNEE 4 OR MORE: CPT

## 2023-10-25 PROCEDURE — 6370000000 HC RX 637 (ALT 250 FOR IP): Performed by: PHYSICIAN ASSISTANT

## 2023-10-25 PROCEDURE — 99283 EMERGENCY DEPT VISIT LOW MDM: CPT

## 2023-10-25 RX ORDER — IBUPROFEN 600 MG/1
600 TABLET ORAL ONCE
Status: COMPLETED | OUTPATIENT
Start: 2023-10-25 | End: 2023-10-25

## 2023-10-25 RX ORDER — IBUPROFEN 600 MG/1
600 TABLET ORAL 3 TIMES DAILY PRN
Qty: 30 TABLET | Refills: 0 | Status: ON HOLD | OUTPATIENT
Start: 2023-10-25

## 2023-10-25 RX ORDER — IBUPROFEN 600 MG/1
600 TABLET ORAL 3 TIMES DAILY PRN
Qty: 30 TABLET | Refills: 0 | Status: SHIPPED | OUTPATIENT
Start: 2023-10-25 | End: 2023-10-25 | Stop reason: SDUPTHER

## 2023-10-25 RX ADMIN — IBUPROFEN 600 MG: 600 TABLET, FILM COATED ORAL at 23:01

## 2023-10-25 ASSESSMENT — PAIN - FUNCTIONAL ASSESSMENT: PAIN_FUNCTIONAL_ASSESSMENT: 0-10

## 2023-10-25 ASSESSMENT — PAIN DESCRIPTION - LOCATION: LOCATION: KNEE

## 2023-10-25 ASSESSMENT — PAIN DESCRIPTION - ORIENTATION: ORIENTATION: RIGHT

## 2023-10-25 ASSESSMENT — PAIN SCALES - GENERAL: PAINLEVEL_OUTOF10: 10

## 2023-10-26 ENCOUNTER — HOSPITAL ENCOUNTER (INPATIENT)
Age: 18
LOS: 4 days | Discharge: HOME | DRG: 753 | End: 2023-10-30
Attending: STUDENT IN AN ORGANIZED HEALTH CARE EDUCATION/TRAINING PROGRAM | Admitting: PSYCHIATRY & NEUROLOGY
Payer: COMMERCIAL

## 2023-10-26 DIAGNOSIS — F31.60 BIPOLAR 1 DISORDER, MIXED (HCC): Primary | ICD-10-CM

## 2023-10-26 DIAGNOSIS — F48.9 MENTAL HEALTH PROBLEM: ICD-10-CM

## 2023-10-26 DIAGNOSIS — R45.850 HOMICIDAL BEHAVIOR: ICD-10-CM

## 2023-10-26 PROBLEM — R45.851 SUICIDAL IDEATION: Status: ACTIVE | Noted: 2023-10-26

## 2023-10-26 PROBLEM — R45.851 SUICIDAL IDEATION: Status: RESOLVED | Noted: 2023-10-26 | Resolved: 2023-10-26

## 2023-10-26 LAB
ALBUMIN SERPL-MCNC: 4 G/DL (ref 3.5–5.2)
ALP SERPL-CCNC: 72 U/L (ref 40–129)
ALT SERPL-CCNC: 12 U/L (ref 0–40)
AMPHET UR QL SCN: NEGATIVE
ANION GAP SERPL CALCULATED.3IONS-SCNC: 13 MMOL/L (ref 7–16)
APAP SERPL-MCNC: <5 UG/ML (ref 10–30)
AST SERPL-CCNC: 16 U/L (ref 0–39)
BARBITURATES UR QL SCN: NEGATIVE
BASOPHILS # BLD: 0.04 K/UL (ref 0–0.2)
BASOPHILS NFR BLD: 1 % (ref 0–2)
BENZODIAZ UR QL: NEGATIVE
BILIRUB SERPL-MCNC: 0.4 MG/DL (ref 0–1.2)
BILIRUB UR QL STRIP: NEGATIVE
BUN SERPL-MCNC: 13 MG/DL (ref 6–20)
BUPRENORPHINE UR QL: NEGATIVE
CALCIUM SERPL-MCNC: 8.9 MG/DL (ref 8.6–10.2)
CANNABINOIDS UR QL SCN: POSITIVE
CHLORIDE SERPL-SCNC: 101 MMOL/L (ref 98–107)
CLARITY UR: CLEAR
CO2 SERPL-SCNC: 21 MMOL/L (ref 22–29)
COCAINE UR QL SCN: NEGATIVE
COLOR UR: YELLOW
CREAT SERPL-MCNC: 0.8 MG/DL (ref 0.7–1.2)
EOSINOPHIL # BLD: 0.53 K/UL (ref 0.05–0.5)
EOSINOPHILS RELATIVE PERCENT: 7 % (ref 0–6)
ERYTHROCYTE [DISTWIDTH] IN BLOOD BY AUTOMATED COUNT: 12.4 % (ref 11.5–15)
ETHANOLAMINE SERPL-MCNC: <10 MG/DL
FENTANYL UR QL: NEGATIVE
GFR SERPL CREATININE-BSD FRML MDRD: >60 ML/MIN/1.73M2
GLUCOSE SERPL-MCNC: 89 MG/DL (ref 74–99)
GLUCOSE UR STRIP-MCNC: NEGATIVE MG/DL
HCT VFR BLD AUTO: 47.3 % (ref 37–54)
HGB BLD-MCNC: 15.6 G/DL (ref 12.5–16.5)
HGB UR QL STRIP.AUTO: NEGATIVE
IMM GRANULOCYTES # BLD AUTO: <0.03 K/UL (ref 0–0.58)
IMM GRANULOCYTES NFR BLD: 0 % (ref 0–5)
KETONES UR STRIP-MCNC: NEGATIVE MG/DL
LEUKOCYTE ESTERASE UR QL STRIP: NEGATIVE
LYMPHOCYTES NFR BLD: 3.86 K/UL (ref 1.5–4)
LYMPHOCYTES RELATIVE PERCENT: 48 % (ref 20–42)
MCH RBC QN AUTO: 29.3 PG (ref 26–35)
MCHC RBC AUTO-ENTMCNC: 33 G/DL (ref 32–34.5)
MCV RBC AUTO: 88.9 FL (ref 80–99.9)
METHADONE UR QL: NEGATIVE
MONOCYTES NFR BLD: 0.55 K/UL (ref 0.1–0.95)
MONOCYTES NFR BLD: 7 % (ref 2–12)
NEUTROPHILS NFR BLD: 38 % (ref 43–80)
NEUTS SEG NFR BLD: 3.11 K/UL (ref 1.8–7.3)
NITRITE UR QL STRIP: NEGATIVE
OPIATES UR QL SCN: NEGATIVE
OXYCODONE UR QL SCN: NEGATIVE
PCP UR QL SCN: NEGATIVE
PH UR STRIP: 6 [PH] (ref 5–9)
PLATELET # BLD AUTO: 248 K/UL (ref 130–450)
PMV BLD AUTO: 8.8 FL (ref 7–12)
POTASSIUM SERPL-SCNC: 3.7 MMOL/L (ref 3.5–5)
PROT SERPL-MCNC: 6 G/DL (ref 6.4–8.3)
PROT UR STRIP-MCNC: NEGATIVE MG/DL
RBC # BLD AUTO: 5.32 M/UL (ref 3.8–5.8)
RBC #/AREA URNS HPF: NORMAL /HPF
SALICYLATES SERPL-MCNC: <0.3 MG/DL (ref 0–30)
SODIUM SERPL-SCNC: 135 MMOL/L (ref 132–146)
SP GR UR STRIP: 1.02 (ref 1–1.03)
TEST INFORMATION: ABNORMAL
TOXIC TRICYCLIC SC,BLOOD: NEGATIVE
UROBILINOGEN UR STRIP-ACNC: 1 EU/DL (ref 0–1)
WBC #/AREA URNS HPF: NORMAL /HPF
WBC OTHER # BLD: 8.1 K/UL (ref 4.5–11.5)

## 2023-10-26 PROCEDURE — G0480 DRUG TEST DEF 1-7 CLASSES: HCPCS

## 2023-10-26 PROCEDURE — 6370000000 HC RX 637 (ALT 250 FOR IP): Performed by: NURSE PRACTITIONER

## 2023-10-26 PROCEDURE — 6360000002 HC RX W HCPCS: Performed by: PSYCHIATRY & NEUROLOGY

## 2023-10-26 PROCEDURE — 1240000000 HC EMOTIONAL WELLNESS R&B

## 2023-10-26 PROCEDURE — 93005 ELECTROCARDIOGRAM TRACING: CPT | Performed by: STUDENT IN AN ORGANIZED HEALTH CARE EDUCATION/TRAINING PROGRAM

## 2023-10-26 PROCEDURE — 80179 DRUG ASSAY SALICYLATE: CPT

## 2023-10-26 PROCEDURE — 99285 EMERGENCY DEPT VISIT HI MDM: CPT

## 2023-10-26 PROCEDURE — 90792 PSYCH DIAG EVAL W/MED SRVCS: CPT | Performed by: NURSE PRACTITIONER

## 2023-10-26 PROCEDURE — 80307 DRUG TEST PRSMV CHEM ANLYZR: CPT

## 2023-10-26 PROCEDURE — 81001 URINALYSIS AUTO W/SCOPE: CPT

## 2023-10-26 PROCEDURE — 80143 DRUG ASSAY ACETAMINOPHEN: CPT

## 2023-10-26 PROCEDURE — 85025 COMPLETE CBC W/AUTO DIFF WBC: CPT

## 2023-10-26 PROCEDURE — 80053 COMPREHEN METABOLIC PANEL: CPT

## 2023-10-26 RX ORDER — DIVALPROEX SODIUM 250 MG/1
250 TABLET, DELAYED RELEASE ORAL EVERY 12 HOURS SCHEDULED
Status: DISCONTINUED | OUTPATIENT
Start: 2023-10-26 | End: 2023-10-30 | Stop reason: HOSPADM

## 2023-10-26 RX ORDER — RISPERIDONE 1 MG/1
1 TABLET ORAL 2 TIMES DAILY
Status: DISCONTINUED | OUTPATIENT
Start: 2023-10-26 | End: 2023-10-30

## 2023-10-26 RX ORDER — POLYETHYLENE GLYCOL 3350 17 G
2 POWDER IN PACKET (EA) ORAL
Status: DISCONTINUED | OUTPATIENT
Start: 2023-10-26 | End: 2023-10-30 | Stop reason: HOSPADM

## 2023-10-26 RX ORDER — HYDROXYZINE PAMOATE 50 MG/1
50 CAPSULE ORAL 3 TIMES DAILY PRN
Status: DISCONTINUED | OUTPATIENT
Start: 2023-10-26 | End: 2023-10-30 | Stop reason: HOSPADM

## 2023-10-26 RX ORDER — MAGNESIUM HYDROXIDE/ALUMINUM HYDROXICE/SIMETHICONE 120; 1200; 1200 MG/30ML; MG/30ML; MG/30ML
30 SUSPENSION ORAL PRN
Status: DISCONTINUED | OUTPATIENT
Start: 2023-10-26 | End: 2023-10-30 | Stop reason: HOSPADM

## 2023-10-26 RX ORDER — LANOLIN ALCOHOL/MO/W.PET/CERES
3 CREAM (GRAM) TOPICAL NIGHTLY PRN
Status: DISCONTINUED | OUTPATIENT
Start: 2023-10-26 | End: 2023-10-30 | Stop reason: HOSPADM

## 2023-10-26 RX ORDER — HALOPERIDOL 5 MG/ML
5 INJECTION INTRAMUSCULAR EVERY 6 HOURS PRN
Status: DISCONTINUED | OUTPATIENT
Start: 2023-10-26 | End: 2023-10-30 | Stop reason: HOSPADM

## 2023-10-26 RX ORDER — ACETAMINOPHEN 325 MG/1
650 TABLET ORAL EVERY 6 HOURS PRN
Status: DISCONTINUED | OUTPATIENT
Start: 2023-10-26 | End: 2023-10-30 | Stop reason: HOSPADM

## 2023-10-26 RX ORDER — NICOTINE 21 MG/24HR
1 PATCH, TRANSDERMAL 24 HOURS TRANSDERMAL DAILY
Status: DISCONTINUED | OUTPATIENT
Start: 2023-10-26 | End: 2023-10-26

## 2023-10-26 RX ORDER — HALOPERIDOL 5 MG/1
5 TABLET ORAL EVERY 6 HOURS PRN
Status: DISCONTINUED | OUTPATIENT
Start: 2023-10-26 | End: 2023-10-30 | Stop reason: HOSPADM

## 2023-10-26 RX ADMIN — DIVALPROEX SODIUM 250 MG: 250 TABLET, DELAYED RELEASE ORAL at 21:26

## 2023-10-26 RX ADMIN — NICOTINE POLACRILEX 2 MG: 2 LOZENGE ORAL at 16:46

## 2023-10-26 RX ADMIN — RISPERIDONE 1 MG: 1 TABLET ORAL at 21:26

## 2023-10-26 RX ADMIN — HALOPERIDOL LACTATE 5 MG: 5 INJECTION, SOLUTION INTRAMUSCULAR at 18:21

## 2023-10-26 ASSESSMENT — PATIENT HEALTH QUESTIONNAIRE - PHQ9
SUM OF ALL RESPONSES TO PHQ QUESTIONS 1-9: 0
1. LITTLE INTEREST OR PLEASURE IN DOING THINGS: 0
2. FEELING DOWN, DEPRESSED OR HOPELESS: 0
SUM OF ALL RESPONSES TO PHQ9 QUESTIONS 1 & 2: 0
SUM OF ALL RESPONSES TO PHQ QUESTIONS 1-9: 0

## 2023-10-26 ASSESSMENT — PAIN DESCRIPTION - DESCRIPTORS: DESCRIPTORS: ACHING

## 2023-10-26 ASSESSMENT — ENCOUNTER SYMPTOMS
PHOTOPHOBIA: 0
VOMITING: 0
BACK PAIN: 0
ABDOMINAL PAIN: 0
NAUSEA: 0
CHEST TIGHTNESS: 0
COUGH: 0
DIARRHEA: 0
SHORTNESS OF BREATH: 0
ABDOMINAL DISTENTION: 0

## 2023-10-26 ASSESSMENT — PAIN SCALES - GENERAL
PAINLEVEL_OUTOF10: 4
PAINLEVEL_OUTOF10: 0

## 2023-10-26 ASSESSMENT — PAIN DESCRIPTION - ORIENTATION: ORIENTATION: RIGHT

## 2023-10-26 ASSESSMENT — PAIN - FUNCTIONAL ASSESSMENT
PAIN_FUNCTIONAL_ASSESSMENT: 0-10
PAIN_FUNCTIONAL_ASSESSMENT: PREVENTS OR INTERFERES SOME ACTIVE ACTIVITIES AND ADLS

## 2023-10-26 ASSESSMENT — LIFESTYLE VARIABLES
HOW OFTEN DO YOU HAVE A DRINK CONTAINING ALCOHOL: NEVER
HOW MANY STANDARD DRINKS CONTAINING ALCOHOL DO YOU HAVE ON A TYPICAL DAY: PATIENT DOES NOT DRINK
HOW OFTEN DO YOU HAVE A DRINK CONTAINING ALCOHOL: NEVER
HOW MANY STANDARD DRINKS CONTAINING ALCOHOL DO YOU HAVE ON A TYPICAL DAY: PATIENT DOES NOT DRINK

## 2023-10-26 ASSESSMENT — SLEEP AND FATIGUE QUESTIONNAIRES
AVERAGE NUMBER OF SLEEP HOURS: 5
AVERAGE NUMBER OF SLEEP HOURS: 5
DO YOU HAVE DIFFICULTY SLEEPING: NO
SLEEP PATTERN: DISTURBED/INTERRUPTED SLEEP
DO YOU USE A SLEEP AID: NO
DO YOU HAVE DIFFICULTY SLEEPING: YES
DO YOU USE A SLEEP AID: NO

## 2023-10-26 ASSESSMENT — PAIN DESCRIPTION - PAIN TYPE: TYPE: ACUTE PAIN

## 2023-10-26 ASSESSMENT — PAIN DESCRIPTION - LOCATION: LOCATION: KNEE

## 2023-10-26 ASSESSMENT — PAIN DESCRIPTION - FREQUENCY: FREQUENCY: CONTINUOUS

## 2023-10-26 ASSESSMENT — PAIN DESCRIPTION - ONSET: ONSET: SUDDEN

## 2023-10-26 NOTE — GROUP NOTE
Group Therapy Note    Date: 10/26/2023    Group Start Time: 1110  Group End Time: 1150  Group Topic: Cognitive Skills    [facility] 7SE ACUTE BH 1    Black, Carl, MSW, LSW        Group Therapy Note    Attendees: 12       Patient's Goal:  Pt will be able to identify cognitive distortions and learn ways to challenge negative thoughts. Notes:  Pt was an active participant in group discussion. Status After Intervention:  Improved    Participation Level: Active Listener and Interactive    Participation Quality: Appropriate, Attentive, Sharing, and Supportive      Speech:  normal      Thought Process/Content: Logical  Linear      Affective Functioning: Blunted      Mood: euthymic      Level of consciousness:  Alert, Oriented x4, and Attentive      Response to Learning: Able to verbalize current knowledge/experience, Able to verbalize/acknowledge new learning, Able to retain information, Capable of insight, and Progressing to goal      Endings: None Reported    Modes of Intervention: Education, Support, Socialization, Exploration, Clarifying, and Problem-solving      Discipline Responsible: /Counselor      Signature:   CHELSEY Blackwood, [facility]

## 2023-10-26 NOTE — PROGRESS NOTES
No change to previous assessment. Patient denies SI/HI/Hallucinations. Patient in control of behavior. No active distress noted will continue to monitor and will intervene as needed.

## 2023-10-26 NOTE — DISCHARGE INSTRUCTIONS
Attending Provider: Yesika Connolly MD.     If you have any questions and need to contact this individual please call the unit at [phone]. [phone] Provider will be available on call 24/7 and during holidays. Reason for Admission:  Patient had apparently been threatening his child's mother. He stated that he was going to kill her boyfriend and kidnap his ex and her baby. Has a mental health hx      Follow up for Tobacco Cessation at:    [facility] Tobacco Treatment                                 Date:  Friday 11/3 at [address] [place] [address]   ([address] [place] [address] B elevators to 7th floor)   Phone: [phone]   Fax: [phone]

## 2023-10-26 NOTE — ED NOTES
Discharge instructions given. Patient verbalizes understanding. No other noted or stated problems at this time. Patient will follow up with primary care.       Demarcus Wilde RN  10/25/23 0609

## 2023-10-26 NOTE — ED NOTES
Fax to nursing office for PACO Sanchez in staffing notified     Sophia Solis RN  10/26/23 9507
N-N given to [facility] @ [phone]     Carmen Churchill  10/26/23 0176
Patients belongings placed in locker 30. Pants, shorts, phone,  block, credit cards, shirt, sweatshirt, socks and shoes.       Toy Helms, [address]  10/26/23 3579
Per Octavio Worrell, Dr. Lizzie Garcia accepting. SW contacted admitting (Harmeet Gillespie), patient assigned to bed 7517 on [facility].      [place]  10/26/23 [address] [place], [place]  10/26/23 [facility]
Pt given a breakfast tray     Pippa Benítez  10/26/23 0890
Report attempted. Nurse stated they will call back when they are done taking vital signs.      Doron Johnson RN  10/26/23 7514
Sitter at [address] [place] [address]  10/26/23 3864
you ever threatened someone with a gun, knife or other weapon? []  No  []  Yes - When and what happened? 2. Have you ever had an order of protection taken out against you? []  Yes []  No  3. Have you ever been arrested due to violence? []  Yes []  No  4. Have you ever been cruel to animals? []  Yes []  No    After consideration of C-SSRS screening results, C-SSRS assessments, and this professional's assessment the patient's overall suicide risk assessed to be:  [] No Risk  [] Low   [x] Moderate   [] High     [x] Discussed current suicide risk, protective and risk factors with RN and ED Physician. Consulted with ED Physician. Disposition/level of care recommended at this time:  [] Home:   [] Outpatient Provider:   [] Crisis Unit:   [x] Inpatient Psychiatric Unit:  [] Other:     -Pt has been [place] Slipped by police. Once medically cleared, SW will proceed with inpatient admission to ensure Pt safety and stabilization.                     Steven Severance, [facility] [facility]  10/26/23 8210

## 2023-10-26 NOTE — PROGRESS NOTES
Patient has no change from previous assessment. Patient denies SI/HI/Hallucinations. Patient has been isolative to his room most of the day. Medication compliant. Will continue to monitor and will intervene as needed with close 15 minute assessments.

## 2023-10-26 NOTE — ED PROVIDER NOTES
Independent CHRISTOPHER Visit. [address]   ED  Encounter Note  Admit Date/RoomTime: 10/25/2023 10:23 PM  ED Room: 37/  NAME: Bibiana Otero  : 2005  MRN: [id number]  PCP: No primary care provider on file. CHIEF COMPLAINT     Leg Pain (Right knee pain, jumped over a fence and fell)    HISTORY OF PRESENT ILLNESS        Bibiana Otero is a 19 y.o. male who presents to the ED by ambulance for right knee pain, beginning a few hour(s) ago. The complaint has been persistent and are mild in severity. Patient states that he was \"standing on a 10 foot fence\" when he tried to jump off the fence \"to be like Superman\" and landed directly on his right knee on concrete. Brought in by EMS. Patient is complaining of pain. Patient denies any other trauma, LOC, or head injury from the incident. Patient seen walking about the ER and smoking outside of the facility. REVIEW OF SYSTEMS     Pertinent positives and negatives are stated within HPI, all other systems reviewed and are negative. Past Medical History:  has a past medical history of ADHD (attention deficit hyperactivity disorder), Autism disorder, GERD (gastroesophageal reflux disease), Hypotonia, Intermittent explosive disorder, Microcephalic ([address]), and Schizoaffective disorder ([address] Surgical History:  has no past surgical history on file. Social History:  reports that he has been smoking cigarettes. He has a 6.00 pack-year smoking history. He has never used smokeless tobacco. He reports that he does not currently use alcohol after a past usage of about 15.0 standard drinks of alcohol per week. He reports that he does not currently use drugs after having used the following drugs: Marijuana Cesar Putty). Frequency: 7.00 times per week. Family History: family history is not on file.    Allergies: Strawberry  CURRENT MEDICATIONS       Previous Medications    No medications on file       SCREENINGS

## 2023-10-26 NOTE — PROGRESS NOTES
4 Eyes Skin Assessment     NAME:  Saintclair Night  YOB: 2005  MEDICAL RECORD NUMBER:  [id number]    The patient is being assessed for  Admission    I agree that at least one RN has performed a thorough Head to Toe Skin Assessment on the patient. ALL assessment sites listed below have been assessed. Areas assessed by both nurses:    Head, Face, Ears, Shoulders, Back, Chest, Arms, Elbows, Hands, Sacrum. Buttock, Coccyx, Ischium, Legs. Feet and Heels, and Under Medical Devices         Does the Patient have a Wound?  No noted wound(s)       Curtis Prevention initiated by RN: No  Wound Care Orders initiated by RN: No    Pressure Injury (Stage 3,4, Unstageable, DTI, NWPT, and Complex wounds) if present, place Wound referral order by RN under : No    New Ostomies, if present place, Ostomy referral order under : No     Nurse 1 eSignature: Electronically signed by Mohini Mahajan RN on 10/26/23 at 11:05 AM EDT    **SHARE this note so that the co-signing nurse can place an eSignature**    Nurse 2 eSignature: {Esignature:[id number]}

## 2023-10-26 NOTE — H&P
ideations intent or plan   language: able to name objects and repeate phrases  Remote Memory: intact  Recent Memory: intact  Cognition:  oriented to person, place, and time   Fund of Knowledge: Vocabulary intact, pt is aware of current events and past history  Attetion and Concentration intact  Insight poor   Judgement poor      DIAGNOSIS:  Bipolar 1 mixed  Autism spectrum disorder          LABS: REVIEWED TODAY:  Recent Labs     10/26/23  0200   WBC 8.1   HGB 15.6        Recent Labs     10/26/23  0200      K 3.7      CO2 21*   BUN 13   CREATININE 0.8   GLUCOSE 89     Recent Labs     10/26/23  0200   BILITOT 0.4   ALKPHOS 72   AST 16   ALT 12     Lab Results   Component Value Date/Time    LABAMPH NOT DETECTED 05/25/2023 04:28 AM    BARBSCNU NEGATIVE 10/26/2023 05:00 AM    LABBENZ NEGATIVE 10/26/2023 05:00 AM    LABMETH NEGATIVE 10/26/2023 05:00 AM    OPIATESCREENURINE NOT DETECTED 05/25/2023 04:28 AM    PHENCYCLIDINESCREENURINE NOT DETECTED 05/25/2023 04:28 AM    ETOH <10 05/25/2023 04:28 AM     Lab Results   Component Value Date/Time    TSH 2.210 05/27/2022 10:18 PM     No results found for: \"LITHIUM\"  Lab Results   Component Value Date    VALPROATE 69 08/17/2023     Lab Results   Component Value Date/Time    VALPROATE 69 08/17/2023 08:15 AM         Radiology XR KNEE RIGHT (MIN 4 VIEWS)    Result Date: 10/25/2023  EXAMINATION: FOUR XRAY VIEWS OF THE RIGHT KNEE 10/25/2023 7:41 pm COMPARISON: Right knee series from September 18, 2023 HISTORY: [address] [place] [address]: fall TECHNOLOGIST PROVIDED HISTORY: Reason for exam:->fall FINDINGS: Radiographs of the right knee demonstrate no fractures with preserved alignment. Joint spaces appear normal. No significant degenerative changes. Osseous mineralization is normal.  There is no large joint effusion. No acute osseous findings seen about the right knee. Normal overall alignment.  RECOMMENDATION: In the setting of recent trauma, if there is

## 2023-10-26 NOTE — PROGRESS NOTES
Patient out on unit with other peers when someone accidentally spilled liquid all over his shirt. This triggered the patient and he started screaming at the other patient. Staff attempted to redirect patient to a more calm enviornment. Patient did not respond well to this. Staff then escorted him to his room and the other patient to his room. At this time we had all other patients return to their rooms. Security was called. Patient given PRN medication IM per EMAR orders. Patient screaming stating he would not take anything by mouth and also screaming profanities to staff.

## 2023-10-26 NOTE — ED PROVIDER NOTES
0.70 - 1.20 mg/dL    Est, Glom Filt Rate >60 >60 mL/min/1.73m2    Calcium 8.9 8.6 - 10.2 mg/dL    Total Protein 6.0 (L) 6.4 - 8.3 g/dL    Albumin 4.0 3.5 - 5.2 g/dL    Total Bilirubin 0.4 0.0 - 1.2 mg/dL    Alkaline Phosphatase 72 40 - 129 U/L    ALT 12 0 - 40 U/L    AST 16 0 - 39 U/L   Serum Drug Screen   Result Value Ref Range    Acetaminophen Level <5 (L) 10.0 - 30.0 ug/mL    Ethanol <02 <83 mg/dL    Salicylate Lvl <9.9 0.0 - 30.0 mg/dL    Toxic Tricyclic Sc,Blood NEGATIVE NEGATIVE   Urinalysis with Microscopic   Result Value Ref Range    Color, UA Yellow Yellow    Turbidity UA Clear Clear    Glucose, Ur NEGATIVE NEGATIVE mg/dL    Bilirubin Urine NEGATIVE NEGATIVE    Ketones, Urine NEGATIVE NEGATIVE mg/dL    Specific Gravity, UA 1.025 1.005 - 1.030    Urine Hgb NEGATIVE NEGATIVE    pH, UA 6.0 5.0 - 9.0    Protein, UA NEGATIVE NEGATIVE mg/dL    Urobilinogen, Urine 1.0 0.0 - 1.0 EU/dL    Nitrite, Urine NEGATIVE NEGATIVE    Leukocyte Esterase, Urine NEGATIVE NEGATIVE    WBC, UA 0 TO 5 0 TO 5 /HPF    RBC, UA 0 TO 2 0 TO 2 /HPF   Urine Drug Screen   Result Value Ref Range    Amphetamine Screen, Ur NEGATIVE NEGATIVE    Barbiturate Screen, Ur NEGATIVE NEGATIVE    Benzodiazepine Screen, Urine NEGATIVE NEGATIVE    Cocaine Metabolite, Urine NEGATIVE NEGATIVE    Methadone Screen, Urine NEGATIVE NEGATIVE    Opiates, Urine NEGATIVE NEGATIVE    Phencyclidine, Urine NEGATIVE NEGATIVE    Cannabinoid Scrn, Ur POSITIVE (A) NEGATIVE    Oxycodone Screen, Ur NEGATIVE NEGATIVE    Fentanyl, Ur NEGATIVE NEGATIVE    Buprenorphine Urine NEGATIVE NEGATIVE    Test Information       These drug screen results are for medical purposes only and should not be considered definitive or confirmed.    EKG 12 Lead   Result Value Ref Range    Ventricular Rate 71 BPM    Atrial Rate 71 BPM    P-R Interval 148 ms    QRS Duration 88 ms    Q-T Interval 356 ms    QTc Calculation (Bazett) 386 ms    P Axis 79 degrees    R Axis 77 degrees    T Axis 69 degrees

## 2023-10-26 NOTE — PROGRESS NOTES
[facility]  Admission Note   Patient admitted from the [facility]. Patient states, \"I told my baby mamma I am going to kill her boyfriend who's name got put on the birth certificate of his daughter and he was going to kidnap her and the baby. \"  Patient also states, \"She knows what is coming for her. \" Patient does have multiple pschyatric hospitalizations. Patients speech is rapid and pressured. Patient has flight of ideas and is jumping from one topic to the next. Patient signed all admission paperwork and vital signs were stable. Patient rates anxiety a 7/10 and depression a 7/10. No active distress noted, respirations even and unlabored. Will continue to monitor and will intervene as needed with close 15 minute patient rounds. Admission Type:   Admission Type: Involuntary    Reason for admission:  Reason for Admission: \"I wanted to pop off my baby mommas boyfriend and kidnap her and the baby.:\" \"She signed the birth certificate with another jaja name. \"      Addictive Behavior:        Medical Problems:   Past Medical History:   Diagnosis Date    ADHD (attention deficit hyperactivity disorder)     Autism disorder     GERD (gastroesophageal reflux disease)     Hypotonia     Intermittent explosive disorder     Microcephalic (HCC)     Schizoaffective disorder (HCC)        Status EXAM:  Mental Status and Behavioral Exam  Normal: No  Level of Assistance: Independent/Self  Facial Expression: Avoids Gaze, Flat, Exaggerated, Expressionless, Worried  Affect: Inappropriate, Blunt, Incongruent  Level of Consciousness: Alert  Frequency of Checks: 4 times per hour, close  Mood:Normal: No  Mood: Irritable, Worthless, low self-esteem  Motor Activity:Normal: No  Motor Activity: Agitated  Eye Contact: Good  Observed Behavior: Guarded, Impulsive, Agitated  Sexual Misconduct History: Past - no  Preception: [place] to person, [place] to time, [place] to place  Attention:Normal: No  Attention: Distractible  Thought Processes:

## 2023-10-27 LAB
EKG ATRIAL RATE: 71 BPM
EKG P AXIS: 79 DEGREES
EKG P-R INTERVAL: 148 MS
EKG Q-T INTERVAL: 356 MS
EKG QRS DURATION: 88 MS
EKG QTC CALCULATION (BAZETT): 386 MS
EKG R AXIS: 77 DEGREES
EKG T AXIS: 69 DEGREES
EKG VENTRICULAR RATE: 71 BPM

## 2023-10-27 PROCEDURE — 6370000000 HC RX 637 (ALT 250 FOR IP): Performed by: PSYCHIATRY & NEUROLOGY

## 2023-10-27 PROCEDURE — 6370000000 HC RX 637 (ALT 250 FOR IP): Performed by: NURSE PRACTITIONER

## 2023-10-27 PROCEDURE — 1240000000 HC EMOTIONAL WELLNESS R&B

## 2023-10-27 PROCEDURE — 93010 ELECTROCARDIOGRAM REPORT: CPT | Performed by: INTERNAL MEDICINE

## 2023-10-27 RX ADMIN — RISPERIDONE 1 MG: 1 TABLET ORAL at 08:39

## 2023-10-27 RX ADMIN — MELATONIN 3 MG ORAL TABLET 3 MG: 3 TABLET ORAL at 20:09

## 2023-10-27 RX ADMIN — DIVALPROEX SODIUM 250 MG: 250 TABLET, DELAYED RELEASE ORAL at 08:39

## 2023-10-27 RX ADMIN — NICOTINE POLACRILEX 2 MG: 2 LOZENGE ORAL at 15:26

## 2023-10-27 RX ADMIN — NICOTINE POLACRILEX 2 MG: 2 LOZENGE ORAL at 09:11

## 2023-10-27 RX ADMIN — NICOTINE POLACRILEX 2 MG: 2 LOZENGE ORAL at 12:30

## 2023-10-27 RX ADMIN — RISPERIDONE 1 MG: 1 TABLET ORAL at 20:09

## 2023-10-27 RX ADMIN — DIVALPROEX SODIUM 250 MG: 250 TABLET, DELAYED RELEASE ORAL at 20:09

## 2023-10-27 RX ADMIN — HYDROXYZINE PAMOATE 50 MG: 50 CAPSULE ORAL at 20:08

## 2023-10-27 NOTE — GROUP NOTE
Group Therapy Note    Date: 10/27/2023  Start Time: 1600  End Time:  4062  Number of Participants: 7    Type of Group: Recreational    Wellness Binder Information  Module Name:  Bo      Patient's Goal:  increase socialization amongst peers. Encourage possible new leisure interest    Notes:  JILL facilitated a game of Sporivas. Patient actively engaged in recreation activity, and was social with peers. Status After Intervention:  Improved    Participation Level:  Active Listener and Interactive    Participation Quality: Appropriate and Attentive      Speech:  normal      Thought Process/Content: Logical      Affective Functioning: Congruent      Mood: euthymic      Level of consciousness:  Alert and Attentive      Response to Learning: Able to verbalize current knowledge/experience, Able to verbalize/acknowledge new learning, and Able to retain information      Endings: None Reported    Modes of Intervention: Socialization and Activity      Discipline Responsible: Psychoeducational Specialist      Signature:  JILL Guzman     Group Therapy Note    Attendees: 7

## 2023-10-27 NOTE — PROGRESS NOTES
Patient speech is pressured, rapid, and disorganized with an Inappropriate laugh. Patient bragging to other patients about \"wanting to kidnap my baby mama\". Patient laughing and joking about \"remember last time I was up in this bitch? I can do whatever I want here\". Patient requesting certain evening shift RN who is not working today and is upset she is not here today. Patient is intrusive with poor boundaries, complains of \"leg spasms and brain spasms and I need to go to ER so I can leave\". Patient denies thoughts to harm self or others, denies hallucinations. Patient denies homicidal ideation. Coloring pages and word search activities were suggested for distraction. Patient is compliant with medications and attends groups.  Appetite is good,

## 2023-10-27 NOTE — PROGRESS NOTES
Patient sleeping during the following invitations to:    [employer]    Patient did not respond to verbal cues. CTRS left a handout on group topic- grounding techniques on patient bedside. Patient will continue to be provided with opportunities to enhance leisure skills/interests and/or coping mechanisms.

## 2023-10-27 NOTE — PROGRESS NOTES
BEHAVIORAL HEALTH FOLLOW-UP NOTE     10/27/2023     Patient was seen and examined in person, Chart reviewed   Patient's case discussed with staff/team    Chief Complaint: \"Send me to the crisis unit today\"    Interim History:   Patient was seen Marla Gongora in bed he appears slightly disheveled currently superficially cooperative and perseverating that he \"wants to go to the crisis unit today and nothing else. \" I explained to the patient that this is not likely to happen today. He states he was feeling \"fine\" his affect is constricted with underlying irritability noted. I asked patient about his medications and any side effects for which he denied stating \"I am not as depressed and not feeling angry. \" He denies suicidal homicidal ideations, auditory visual hallucinations. Appetite:   [x] Normal/Unchanged  [] Increased  [] Decreased      Sleep:       [x] Normal/Unchanged  [] Fair       [] Poor              Energy:    [x] Normal/Unchanged  [] Increased  [] Decreased        SI [] Present  [x] Absent    HI  []Present  [x] Absent     Aggression:  [] yes  [x] no    Patient is [x] able  [] unable to CONTRACT FOR SAFETY     PAST MEDICAL/PSYCHIATRIC HISTORY:   Past Medical History:   Diagnosis Date    ADHD (attention deficit hyperactivity disorder)     Autism disorder     GERD (gastroesophageal reflux disease)     Hypotonia     Intermittent explosive disorder     Microcephalic ([address])     Schizoaffective disorder ([address])        FAMILY/SOCIAL HISTORY:  History reviewed. No pertinent family history.   Social History     Socioeconomic History    Marital status: Single     Spouse name: Not on file    Number of children: 2    Years of education: 11    Highest education level: Not on file   Occupational History    Not on file   Tobacco Use    Smoking status: Every Day     Packs/day: 1.00     Years: 6.00     Additional pack years: 0.00     Total pack years: 6.00     Types: Cigarettes    Smokeless tobacco: Never   Vaping Use

## 2023-10-27 NOTE — PROGRESS NOTES
Resting in room had emergency meds earlier for acting out behaviors on unit denies any Si HI or church currently emotional support given
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
## 2023-10-27 NOTE — GROUP NOTE
Group Therapy Note    Date: 10/27/2023    Group Start Time: 1489  Group End Time: 1600  Group Topic: Relaxation    [facility] 7W ACUTE BH 2    Tilmon Ing, CTRS                                                                        Group Therapy Note    Date: 10/27/2023  Start Time: 8406  End Time:  1600  Number of Participants: 11    Type of Group: Relaxation and art as a therapeutic intervention    Wellness Binder Information  Module Name:  Watercolors and [place]    Patient's Goal:  to increase relaxation through the use of art. Encourage exploration of possible new leisure interest.    Notes:  JILL facilitated a watercolor art project with [employer]. Patient actively engaged in art activity and was receptive to information provided. Status After Intervention:  Improved    Participation Level:  Active Listener and Interactive    Participation Quality: Appropriate and Attentive      Speech:  normal      Thought Process/Content: Logical      Affective Functioning: Congruent      Mood: euthymic      Level of consciousness:  Alert and Attentive      Response to Learning: Able to verbalize current knowledge/experience and Able to verbalize/acknowledge new learning      Endings: None Reported    Modes of Intervention: Education, Activity, Socialization      Discipline Responsible: Psychoeducational Specialist      Signature:  JILL Drew     Group Therapy Note    Attendees: 11

## 2023-10-27 NOTE — PROGRESS NOTES
[facility]  Initial Interdisciplinary Treatment Plan NOTE    Review Date & Time: 10/27/2023 1000    Patient was not in treatment team    Admission Type:   Admission Type: Involuntary    Reason for admission:  Reason for Admission: \"I wanted to pop off my baby mommas boyfriend and kidnap her and the baby.:\" \"She signed the birth certificate with another jaja name. \"      Estimated Length of Stay Update:  3-5 days  Estimated Discharge Date Update: 10/30/2023    EDUCATION:   Learner Progress Toward Treatment Goals: Reviewed group plan and strategies    Method: Individual    Outcome: Needs reinforcement    PATIENT GOALS: medication compliance and group therapy attendance    PLAN/TREATMENT RECOMMENDATIONS UPDATE:medication compliance and group therapy attendance    GOALS UPDATE:   Time frame for Short-Term Goals: 3-5 days    Kina Luevano RN

## 2023-10-28 PROCEDURE — 6370000000 HC RX 637 (ALT 250 FOR IP): Performed by: NURSE PRACTITIONER

## 2023-10-28 PROCEDURE — 6370000000 HC RX 637 (ALT 250 FOR IP): Performed by: PSYCHIATRY & NEUROLOGY

## 2023-10-28 PROCEDURE — 1240000000 HC EMOTIONAL WELLNESS R&B

## 2023-10-28 PROCEDURE — 99232 SBSQ HOSP IP/OBS MODERATE 35: CPT | Performed by: NURSE PRACTITIONER

## 2023-10-28 RX ADMIN — NICOTINE POLACRILEX 2 MG: 2 LOZENGE ORAL at 13:11

## 2023-10-28 RX ADMIN — RISPERIDONE 1 MG: 1 TABLET ORAL at 21:24

## 2023-10-28 RX ADMIN — RISPERIDONE 1 MG: 1 TABLET ORAL at 09:02

## 2023-10-28 RX ADMIN — DIVALPROEX SODIUM 250 MG: 250 TABLET, DELAYED RELEASE ORAL at 09:02

## 2023-10-28 RX ADMIN — MELATONIN 3 MG ORAL TABLET 3 MG: 3 TABLET ORAL at 21:24

## 2023-10-28 RX ADMIN — HYDROXYZINE PAMOATE 50 MG: 50 CAPSULE ORAL at 21:24

## 2023-10-28 RX ADMIN — NICOTINE POLACRILEX 2 MG: 2 LOZENGE ORAL at 18:38

## 2023-10-28 RX ADMIN — DIVALPROEX SODIUM 250 MG: 250 TABLET, DELAYED RELEASE ORAL at 21:24

## 2023-10-28 RX ADMIN — ACETAMINOPHEN 650 MG: 325 TABLET ORAL at 08:14

## 2023-10-28 ASSESSMENT — PAIN - FUNCTIONAL ASSESSMENT: PAIN_FUNCTIONAL_ASSESSMENT: ACTIVITIES ARE NOT PREVENTED

## 2023-10-28 ASSESSMENT — PAIN DESCRIPTION - ORIENTATION: ORIENTATION: RIGHT;LOWER

## 2023-10-28 ASSESSMENT — PAIN DESCRIPTION - LOCATION: LOCATION: TEETH;MOUTH

## 2023-10-28 NOTE — PROGRESS NOTES
Patient denies suicidal ideation, denies homicidal ideation, denies hallucinations. Patient is compliant with medications, was encouraged to attend group therapy. Patient can be impulsive and childlike at times, redirects well when impulsivity becomes a safety concern. Patient appetite is good.

## 2023-10-28 NOTE — PROGRESS NOTES
BEHAVIORAL HEALTH FOLLOW-UP NOTE     10/28/2023     Patient was seen and examined in person, Chart reviewed   Patient's case discussed with staff/team    Chief Complaint: \"When can I be discharged? \"    Interim History:   Patient was seen Justice Finical in bed he appears slightly disheveled currently superficially cooperative and perseverating that he wants to be discharged. Patient has marginal insight at his baseline. He is limited intellectual.  His thoughts about his \"baby mamma\" are concrete and fixed delusions. He denies suicidal homicidal ideations, auditory visual hallucinations. Her insight judgment impulse control poor understanding of need for hospitalization. Minimizing circumstances of hospitalization, requesting discharge. Appetite:   [x] Normal/Unchanged  [] Increased  [] Decreased      Sleep:       [x] Normal/Unchanged  [] Fair       [] Poor              Energy:    [x] Normal/Unchanged  [] Increased  [] Decreased        SI [] Present  [x] Absent    HI  []Present  [x] Absent     Aggression:  [] yes  [x] no    Patient is [x] able  [] unable to CONTRACT FOR SAFETY     PAST MEDICAL/PSYCHIATRIC HISTORY:   Past Medical History:   Diagnosis Date    ADHD (attention deficit hyperactivity disorder)     Autism disorder     GERD (gastroesophageal reflux disease)     Hypotonia     Intermittent explosive disorder     Microcephalic ([address])     Schizoaffective disorder ([address])        FAMILY/SOCIAL HISTORY:  History reviewed. No pertinent family history.   Social History     Socioeconomic History    Marital status: Single     Spouse name: Not on file    Number of children: 2    Years of education: 11    Highest education level: Not on file   Occupational History    Not on file   Tobacco Use    Smoking status: Every Day     Packs/day: 1.00     Years: 6.00     Additional pack years: 0.00     Total pack years: 6.00     Types: Cigarettes    Smokeless tobacco: Never   Vaping Use    Vaping Use: Every day    Substances:

## 2023-10-28 NOTE — PROGRESS NOTES
Patient declined invitation to the following groups:    [employer]    Patient will continue to be provided with opportunities to enhance leisure skills/interests and/or coping mechanisms.

## 2023-10-28 NOTE — BH NOTE
Pt denies suicidal / homicidal ideations. Pt denies hallucinations. Pt is without immediate behavioral concerns . Pt is discharge focus and appears to have poor insight.

## 2023-10-28 NOTE — PROGRESS NOTES
Out on unit this evening had somatic c/o denies any SI HI or church compliant with HS meds behavior relative good control this evening

## 2023-10-29 PROCEDURE — 1240000000 HC EMOTIONAL WELLNESS R&B

## 2023-10-29 PROCEDURE — 6370000000 HC RX 637 (ALT 250 FOR IP): Performed by: NURSE PRACTITIONER

## 2023-10-29 PROCEDURE — 6370000000 HC RX 637 (ALT 250 FOR IP): Performed by: PSYCHIATRY & NEUROLOGY

## 2023-10-29 PROCEDURE — 99232 SBSQ HOSP IP/OBS MODERATE 35: CPT | Performed by: NURSE PRACTITIONER

## 2023-10-29 RX ADMIN — DIVALPROEX SODIUM 250 MG: 250 TABLET, DELAYED RELEASE ORAL at 21:15

## 2023-10-29 RX ADMIN — DIVALPROEX SODIUM 250 MG: 250 TABLET, DELAYED RELEASE ORAL at 08:37

## 2023-10-29 RX ADMIN — HYDROXYZINE PAMOATE 50 MG: 50 CAPSULE ORAL at 21:15

## 2023-10-29 RX ADMIN — NICOTINE POLACRILEX 2 MG: 2 LOZENGE ORAL at 08:34

## 2023-10-29 RX ADMIN — NICOTINE POLACRILEX 2 MG: 2 LOZENGE ORAL at 13:59

## 2023-10-29 RX ADMIN — RISPERIDONE 1 MG: 1 TABLET ORAL at 08:38

## 2023-10-29 RX ADMIN — NICOTINE POLACRILEX 2 MG: 2 LOZENGE ORAL at 18:11

## 2023-10-29 RX ADMIN — MELATONIN 3 MG ORAL TABLET 3 MG: 3 TABLET ORAL at 21:15

## 2023-10-29 RX ADMIN — NICOTINE POLACRILEX 2 MG: 2 LOZENGE ORAL at 21:15

## 2023-10-29 RX ADMIN — RISPERIDONE 1 MG: 1 TABLET ORAL at 21:15

## 2023-10-29 ASSESSMENT — PAIN SCALES - GENERAL
PAINLEVEL_OUTOF10: 0

## 2023-10-29 NOTE — PROGRESS NOTES
BEHAVIORAL HEALTH FOLLOW-UP NOTE     10/29/2023     Patient was seen and examined in person, Chart reviewed   Patient's case discussed with staff/team    Chief Complaint: \"I need to be discharged today so I can support my children\"    Interim History:   Patient is discharge focused, he is irritable rambling about needing to be discharged so that he can go home to support his baby mamma and all of his children. He is telling me that he has multiple children which is a delusion. He states that this is his last weekend of work and he needs to get his paycheck. He states he has to be discharged today so he can collect his paycheck. He is intrusive irritable not easily redirected keeps coming back up to me while I am attempting to round and needs to be redirected several times. Eventually patient is directed to go to his room. Nursing report that the patient has been irritable and slightly agitated all morning and difficult to redirect. Her insight judgment impulse control poor understanding of need for hospitalization. Minimizing circumstances of hospitalization, requesting discharge. Appetite:   [x] Normal/Unchanged  [] Increased  [] Decreased      Sleep:       [x] Normal/Unchanged  [] Fair       [] Poor              Energy:    [x] Normal/Unchanged  [] Increased  [] Decreased        SI [] Present  [x] Absent    HI  []Present  [x] Absent     Aggression:  [] yes  [x] no    Patient is [x] able  [] unable to CONTRACT FOR SAFETY     PAST MEDICAL/PSYCHIATRIC HISTORY:   Past Medical History:   Diagnosis Date    ADHD (attention deficit hyperactivity disorder)     Autism disorder     GERD (gastroesophageal reflux disease)     Hypotonia     Intermittent explosive disorder     Microcephalic ([address])     Schizoaffective disorder ([address])        FAMILY/SOCIAL HISTORY:  History reviewed. No pertinent family history.   Social History     Socioeconomic History    Marital status: Single     Spouse name: Not on file    Number

## 2023-10-29 NOTE — BH NOTE
Pt denies suicidal / homicidal ideations. Pt denies hallucinations. Pt is cooperative. Pt is discharge focused but has a basic understanding of to keep demeanor in overall control. Pt is social with peers. No other immediate behavioral concerns at this time.

## 2023-10-29 NOTE — BH NOTE
Pt denies suicidal / homicidal ideations. Pt denies hallucinations. Pt has a labile affect and can become easily agitated / irritable due to being preoccupied with being in the hospital. Pt is focused on wanting to be transferred to crisis unit. Pt has very poor insight as to why he is being treated and does not accept any discussion into what assessment details presented on his chart. Pt has not been physically violent. When irritated, pt appears to be able to be talked down to prevent an outburst of behaviors.

## 2023-10-29 NOTE — PROGRESS NOTES
Patient declined invitation to the following groups:    [employer]    Patient will continue to be provided with opportunities to enhance leisure skills/interests and/or coping mechanisms.

## 2023-10-30 VITALS
DIASTOLIC BLOOD PRESSURE: 56 MMHG | RESPIRATION RATE: 15 BRPM | BODY MASS INDEX: 21.22 KG/M2 | HEART RATE: 65 BPM | TEMPERATURE: 97.8 F | HEIGHT: 68 IN | OXYGEN SATURATION: 98 % | SYSTOLIC BLOOD PRESSURE: 100 MMHG | WEIGHT: 140 LBS

## 2023-10-30 PROCEDURE — 6370000000 HC RX 637 (ALT 250 FOR IP): Performed by: NURSE PRACTITIONER

## 2023-10-30 PROCEDURE — 6370000000 HC RX 637 (ALT 250 FOR IP): Performed by: PSYCHIATRY & NEUROLOGY

## 2023-10-30 PROCEDURE — 99239 HOSP IP/OBS DSCHRG MGMT >30: CPT | Performed by: NURSE PRACTITIONER

## 2023-10-30 RX ORDER — DIVALPROEX SODIUM 250 MG/1
250 TABLET, DELAYED RELEASE ORAL EVERY 12 HOURS SCHEDULED
Qty: 60 TABLET | Refills: 0 | Status: SHIPPED | OUTPATIENT
Start: 2023-10-30 | End: 2023-11-29

## 2023-10-30 RX ADMIN — NICOTINE POLACRILEX 2 MG: 2 LOZENGE ORAL at 14:05

## 2023-10-30 RX ADMIN — NICOTINE POLACRILEX 2 MG: 2 LOZENGE ORAL at 10:17

## 2023-10-30 RX ADMIN — ACETAMINOPHEN 650 MG: 325 TABLET ORAL at 10:36

## 2023-10-30 RX ADMIN — RISPERIDONE 1 MG: 1 TABLET ORAL at 08:40

## 2023-10-30 RX ADMIN — DIVALPROEX SODIUM 250 MG: 250 TABLET, DELAYED RELEASE ORAL at 08:40

## 2023-10-30 ASSESSMENT — PAIN SCALES - GENERAL
PAINLEVEL_OUTOF10: 0
PAINLEVEL_OUTOF10: 5

## 2023-10-30 ASSESSMENT — PAIN - FUNCTIONAL ASSESSMENT: PAIN_FUNCTIONAL_ASSESSMENT: ACTIVITIES ARE NOT PREVENTED

## 2023-10-30 ASSESSMENT — PAIN DESCRIPTION - ORIENTATION: ORIENTATION: RIGHT;LOWER

## 2023-10-30 ASSESSMENT — PAIN DESCRIPTION - DESCRIPTORS: DESCRIPTORS: ACHING;DISCOMFORT;THROBBING

## 2023-10-30 ASSESSMENT — PAIN DESCRIPTION - LOCATION: LOCATION: TEETH

## 2023-10-30 NOTE — PROGRESS NOTES
Pt up in milieu, quiet, drawing, behavior in control. Remains on close observation staggered q 15 min checks.
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
## 2023-10-30 NOTE — PLAN OF CARE
Problem: Anxiety  Goal: Will report anxiety at manageable levels  Description: INTERVENTIONS:  1. Administer medication as ordered  2. Teach and rehearse alternative coping skills  3. Provide emotional support with 1:1 interaction with staff  10/29/2023 1013 by Radha Umana RN  Outcome: Not Progressing  10/29/2023 0456 by Angelito Rendon RN  Outcome: Progressing     Problem: Coping  Goal: Pt/Family able to verbalize concerns and demonstrate effective coping strategies  Description: INTERVENTIONS:  1. Assist patient/family to identify coping skills, available support systems and cultural and spiritual values  2. Provide emotional support, including active listening and acknowledgement of concerns of patient and caregivers  3. Reduce environmental stimuli, as able  4. Instruct patient/family in relaxation techniques, as appropriate  5. Assess for spiritual pain/suffering and initiate Spiritual Care, Psychosocial Clinical Specialist consults as needed  10/29/2023 1013 by Rdaha Umana RN  Outcome: Not Progressing  10/29/2023 0456 by Angelito Rendon RN  Outcome: Progressing     Problem: Anxiety  Goal: Will report anxiety at manageable levels  Description: INTERVENTIONS:  1. Administer medication as ordered  2. Teach and rehearse alternative coping skills  3. Provide emotional support with 1:1 interaction with staff  10/29/2023 1013 by Radha Umana RN  Outcome: Not Progressing  10/29/2023 0456 by Angelito Rendon RN  Outcome: Progressing     Problem: Coping  Goal: Pt/Family able to verbalize concerns and demonstrate effective coping strategies  Description: INTERVENTIONS:  1. Assist patient/family to identify coping skills, available support systems and cultural and spiritual values  2. Provide emotional support, including active listening and acknowledgement of concerns of patient and caregivers  3. Reduce environmental stimuli, as able  4. Instruct patient/family in relaxation techniques, as appropriate  5.  Assess for
Problem: Anxiety  Goal: Will report anxiety at manageable levels  Description: INTERVENTIONS:  1. Administer medication as ordered  2. Teach and rehearse alternative coping skills  3. Provide emotional support with 1:1 interaction with staff  Outcome: Progressing     Problem: Coping  Goal: Pt/Family able to verbalize concerns and demonstrate effective coping strategies  Description: INTERVENTIONS:  1. Assist patient/family to identify coping skills, available support systems and cultural and spiritual values  2. Provide emotional support, including active listening and acknowledgement of concerns of patient and caregivers  3. Reduce environmental stimuli, as able  4. Instruct patient/family in relaxation techniques, as appropriate  5.  Assess for spiritual pain/suffering and initiate Spiritual Care, Psychosocial Clinical Specialist consults as needed  Outcome: Progressing
Problem: Pain  Goal: Verbalizes/displays adequate comfort level or baseline comfort level  Outcome: Progressing     Problem: Risk for Elopement  Goal: Patient will not exit the unit/facility without proper excort  Outcome: Progressing     Problem: Anxiety  Goal: Will report anxiety at manageable levels  Description: INTERVENTIONS:  1. Administer medication as ordered  2. Teach and rehearse alternative coping skills  3. Provide emotional support with 1:1 interaction with staff  10/28/2023 1741 by Fahad Livingston RN  Outcome: Progressing  10/28/2023 1001 by Lady Acacia RN  Outcome: Progressing     Problem: Coping  Goal: Pt/Family able to verbalize concerns and demonstrate effective coping strategies  Description: INTERVENTIONS:  1. Assist patient/family to identify coping skills, available support systems and cultural and spiritual values  2. Provide emotional support, including active listening and acknowledgement of concerns of patient and caregivers  3. Reduce environmental stimuli, as able  4. Instruct patient/family in relaxation techniques, as appropriate  5. Assess for spiritual pain/suffering and initiate Spiritual Care, Psychosocial Clinical Specialist consults as needed  10/28/2023 1741 by Fahad Livingston RN  Outcome: Progressing  10/28/2023 1001 by Lady Acacia RN  Outcome: Progressing     Problem: Death & Dying  Goal: Pt/Family communicate acceptance of impending death and feel psychological comfort and peace  Description: INTERVENTIONS:  1. Assess patient/family anxiety and grief process related to end of life issues  2. Provide emotional and spiritual support  3. Provide information about the patient's health status with consideration of family and cultural values  4. Communicate willingness to discuss death and facilitate grief process  with patient/family as appropriate  5. Emphasize sustaining relationships within family system and community, or odette/spiritual traditions  6.  Initiate
Problem: Pain  Goal: Verbalizes/displays adequate comfort level or baseline comfort level  Outcome: Progressing     Problem: Risk for Elopement  Goal: Patient will not exit the unit/facility without proper excort  Outcome: Progressing     Problem: Anxiety  Goal: Will report anxiety at manageable levels  Description: INTERVENTIONS:  1. Administer medication as ordered  2. Teach and rehearse alternative coping skills  3. Provide emotional support with 1:1 interaction with staff  Outcome: Progressing     Problem: Coping  Goal: Pt/Family able to verbalize concerns and demonstrate effective coping strategies  Description: INTERVENTIONS:  1. Assist patient/family to identify coping skills, available support systems and cultural and spiritual values  2. Provide emotional support, including active listening and acknowledgement of concerns of patient and caregivers  3. Reduce environmental stimuli, as able  4. Instruct patient/family in relaxation techniques, as appropriate  5. Assess for spiritual pain/suffering and initiate Spiritual Care, Psychosocial Clinical Specialist consults as needed  Outcome: Progressing     Problem: Death & Dying  Goal: Pt/Family communicate acceptance of impending death and feel psychological comfort and peace  Description: INTERVENTIONS:  1. Assess patient/family anxiety and grief process related to end of life issues  2. Provide emotional and spiritual support  3. Provide information about the patient's health status with consideration of family and cultural values  4. Communicate willingness to discuss death and facilitate grief process  with patient/family as appropriate  5. Emphasize sustaining relationships within family system and community, or odette/spiritual traditions  6.  Initiate Spiritual Care, Psychosocial Clinical Specialist, consult as needed  Outcome: Progressing     Problem: Decision Making  Goal: Pt/Family able to effectively weigh alternatives and participate in decision
Problem: Pain  Goal: Verbalizes/displays adequate comfort level or baseline comfort level  Outcome: Progressing     Problem: Risk for Elopement  Goal: Patient will not exit the unit/facility without proper excort  Outcome: Progressing  Flowsheets  Taken 10/26/2023 1000 by Devan Fregoso RN  Nursing Interventions for Elopement Risk:   Assist with personal care needs such as toileting, eating, dressing, as needed to reduce the risk of wandering   Communicate/escalate to /other team member the risk of elopement   Collaborate with treatment team for nicotine replacement   Collaborate with family members/caregivers to mitigate the elopement risk  Taken 10/26/2023 0153 by Judith Dinero RN  Nursing Interventions for Elopement Risk:   Communicate to physician the risk for elopement   Shoes and clothing collected and placed in gown attire     Problem: Coping  Goal: Pt/Family able to verbalize concerns and demonstrate effective coping strategies  Description: INTERVENTIONS:  1. Assist patient/family to identify coping skills, available support systems and cultural and spiritual values  2. Provide emotional support, including active listening and acknowledgement of concerns of patient and caregivers  3. Reduce environmental stimuli, as able  4. Instruct patient/family in relaxation techniques, as appropriate  5. Assess for spiritual pain/suffering and initiate Spiritual Care, Psychosocial Clinical Specialist consults as needed  Outcome: Progressing     Problem: Change in Body Image  Goal: Pt/Family communicate acceptance of loss or change in body image and feel psychological comfort and peace  Description: INTERVENTIONS:  1. Assess patient/family anxiety and grief process related to change in body image, loss of functional status, loss of sense of self, and forgiveness  2. Provide emotional and spiritual support  3.  Provide information about the patient's health status with consideration of family and
Pt resting in bed apparently asleep with easy even respirations at HS q 15 min electronic rounding.
as appropriate  Outcome: Not Progressing
Help patient and family identify pros/cons of alternative solutions  4. Provide information as requested by patient/family  5. Respect patient/family right to receive or not to receive information  6. Serve as a liaison between patient and family and health care team  7. Initiate Consults from Ethics, Palliative Care or initiate [phone] as is appropriate  10/30/2023 1010 by Osman Stapleton RN  Outcome: Adequate for Discharge  10/30/2023 0456 by Telma Ozuna RN  Outcome: Progressing     Problem: Confusion  Goal: Confusion, delirium, dementia, or psychosis is improved or at baseline  Description: INTERVENTIONS:  1. Assess for possible contributors to thought disturbance, including medications, impaired vision or hearing, underlying metabolic abnormalities, dehydration, psychiatric diagnoses, and notify attending LIP  2. [place] high risk fall precautions, as indicated  3. Provide frequent short contacts to provide reality reorientation, refocusing and direction  4. Decrease environmental stimuli, including noise as appropriate  5. Monitor and intervene to maintain adequate nutrition, hydration, elimination, sleep and activity  6. If unable to ensure safety without constant attention obtain sitter and review sitter guidelines with assigned personnel  7. Initiate Psychosocial CNS and Spiritual Care consult, as indicated  10/30/2023 1010 by Osman Stapleton RN  Outcome: Adequate for Discharge  10/30/2023 0456 by Telma Ozuna RN  Outcome: Progressing     Problem: Behavior  Goal: Pt/Family maintain appropriate behavior and adhere to behavioral management agreement, if implemented  Description: INTERVENTIONS:  1. Assess patient/family's coping skills and  non-compliant behavior (including use of illegal substances)  2. Notify security of behavior or suspected illegal substances which indicate the need for search of the family and/or belongings  3.  Encourage verbalization of thoughts and concerns in a
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
## 2023-10-30 NOTE — PROGRESS NOTES
Patient out on the unit. He is social with peers, intrusive at the nurses station and discharged focus. He presents with rapid speech, hypermobile, restless, and worried about discharge. Denies SI,HI, or any Hallucinations. He takes his meds and groups are encouraged. Will continue to monitor.

## 2023-10-30 NOTE — PROGRESS NOTES
[facility]  Discharge Note    Pt discharged with followings belongings:   Clothing: Footwear, Pants, Hat, Shirt  Other Valuables:  (1 visa card)   Valuables sent home with patient or returned to patient. Patient educated on aftercare instructions:  yes . Patient verbalize understanding of AVS:  yes. Status EXAM upon discharge:  Mental Status and Behavioral Exam  Normal: No  Level of Assistance: Independent/Self  Facial Expression: Exaggerated  Affect: Blunt  Level of Consciousness: Alert  Frequency of Checks: 4 times per hour, close  Mood:Normal: No  Mood: Anxious, Labile  Motor Activity:Normal: No  Motor Activity: Increased  Eye Contact: Fair  Observed Behavior: Impulsive, Hypermobile  Sexual Misconduct History: Current - no  Preception: [place] to person, [place] to time, [place] to place, [place] to situation  Attention:Normal: No  Attention: Distractible  Thought Processes: Flight of ideas, Circumstantial  Thought Content:Normal: No  Thought Content: Preoccupations, Delusions  Depression Symptoms: Impaired concentration  Anxiety Symptoms: Generalized  Shilpa Symptoms: Flight of ideas, Pressured speech, Grandiosity  Hallucinations: None  Delusions: Yes  Delusions: Somatic, Grandeur  Memory:Normal: No  Memory: Confabulation  Insight and Judgment: No  Insight and Judgment: Poor insight, Unrealistic    Tobacco Screening:  Practical Counseling, on admission, silvana X, if applicable and completed (first 3 are required if patient doesn't refuse):             ( x) Recognizing danger situations (included triggers and roadblocks)                    (x ) Coping skills (new ways to manage stress,relaxation techniques, changing routine, distraction)                                                           (x ) Basic information about quitting (benefits of quitting, techniques in how to quit, available resources  ( x) Referral for counseling faxed to [phone]

## 2023-10-30 NOTE — PROGRESS NOTES
CLINICAL PHARMACY NOTE: MEDS TO BEDS    Total # of Prescriptions Filled: 1   The following medications were delivered to the patient:  Depakote 250mg    Additional Documentation:  Tad Villafuerte delivered to [facility] RN 10-30-23

## 2023-10-30 NOTE — GROUP NOTE
Group Therapy Note    Date: 10/30/2023    Group Start Time: 1010  Group End Time: 3939  Group Topic: Psychoeducation    [facility]    Emely Willoughby                                                                        Group Therapy Note    Date: 10/30/2023  Wellness Binder Information  Module Name:  taking care of self   Patient's Goal:  Patient will be able to id key steps one can take to make self a priority. Notes:  Pleasant and sharing in group, able to participate appropriately, accepting of handout. Status After Intervention:  Improved    Participation Level:  Active Listener and Interactive    Participation Quality: Appropriate, Attentive, and Supportive      Speech:  normal      Thought Process/Content: Logical      Affective Functioning: Congruent      Mood: euthymic      Level of consciousness:  Alert, Oriented x4, and Attentive      Response to Learning: Able to verbalize/acknowledge new learning, Able to retain information, and Progressing to goal      Endings: None Reported    Modes of Intervention: Education, Support, Socialization, and Exploration      Discipline Responsible: Psychoeducational Specialist      Signature:  Melton Callander

## 2023-10-30 NOTE — GROUP NOTE
Group Therapy Note    Date: 10/30/2023    Group Start Time: 1100  Group End Time: 5020  Group Topic: Cognitive Skills    [facility]    Kate Todd, CHELSEY, MURRAY        Group Therapy Note    Attendees: 10       Patient's Goal:  Pt attended group therapy where we discussed assertive communication - how to practice it and the benefits of being able to implement the communication style in real life situations. We also discussed passive and aggressive styles of communicating. Notes:  Pt was an active participant in group therapy. They were able to contribute conversation to the group but was inappropriate and intrusive with minimal focus on topic at hand. Status After Intervention:  Unchanged    Participation Level: Interactive and Minimal    Participation Quality: Inappropriate and Intrusive      Speech:  normal      Thought Process/Content: Logical      Affective Functioning: Congruent      Mood: euthymic      Level of consciousness:  Preoccupied and Inattentive      Response to Learning: Resistant      Endings: None Reported    Modes of Intervention: Education, Support, Socialization, Exploration, Clarifying, and Problem-solving      Discipline Responsible: /Counselor      Signature:   CHELSEY Arizmendi, MURRAY

## 2023-10-30 NOTE — PROGRESS NOTES
Pt wanting an xray of his left knee then wanted one of his left foot. Pt stated he pulled it out of joint and can't walk on it. Pt leaning on the nurses station demanding the xray. Pt encouraged to go to his room and this nurse would assist him. Pt goes to the nurse passing medications on the unit first asking for his night medications. Pt had no issues walking to the medication nurse nor to his room afterwards. Pt encouraged to rest as this nurse got a pillow and ice pack to help his knee. Pt up out of his room prior to this nurse returning with a pillow. Pt out on the unit on his knees by the other medication nurse pleading he is in pain. Again pt encouraged to rest his knee and ice it. Pt refused \"That won't work. It feels like it shattered into a million pieces. \" Pt will limp on the unit then walk with no issue. Pt went to the snack cart with no issue and brought back a orange jello for a staff member. Pt then demanded his knee needs xray and won't get better without it. Will continue to monitor.
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
## 2023-10-30 NOTE — PROGRESS NOTES
Patient to the nurses station multiple times. He keeps requesting for the same information about discharge, his ride there, his Grandmother (He feels will say something to keep him in the hospital), if he is excepted yet, if he can get another ride instead. He ask all the nurses, asks social workers, and any staff. Numerous redirections with him not listening.

## 2023-10-30 NOTE — PROGRESS NOTES
Patient attended morning community meeting. Updated on staffing assignments and daily expectations. Shared goal for the day as to stay positive.

## 2023-10-31 NOTE — CARE COORDINATION
Biopsychosocial Assessment Note    Social work met with patient to complete the biopsychosocial assessment and C-SSRS. Chief Complaint: pt reports \"I don't want to talk about it. \"     Mental Status Exam: pt alert&oriented x4. Pt superficially cooperative, discharge focused, minimizing reason for admission. Pt mood irritable, anxious, blunt affect. Pt eye contact poor, speech rapid/pressured. Pt thoughts circumstantial, preoccupied, delusional about his \"baby momma. \" Pt insight/judgement poor. Pt denied SI/HI/AVH. Clinical Summary: pt did not want to talk about his reason for admission. Pt is very discharge focused and was superficially cooperative during this assessment. Per Ed SW note, Patient is a 19year old male who was brought to the ED via ambulance. Patient is on a pink slip: Male made statements to PD to kill himself, run into traffic in front of a car. Per triage complaint: [place] slipped by [place] [employer]. Pt at [address] [facility] for knee injury when \"baby mama called and said she was delivery baby\" Pt upset and threatened staff because they wouldn't let him in because \"baby mama put someone else's name on birth certificate\" Pt admits to [facility] with plan to jump in front of a truck. Pt states \"I'm just going to do it and you can see it on the news\"    Pt has a hx of inpatient psychiatric admissions at this facility and was last admitted 1/23/2023. Pt reports being active with [facility] however he has not gone to any of his appointments. Per chart, pt has a hx of outpatient treatment at [facility]. Pt unable to saw how long he has been off of his prescribed psychotropic medications. Per ED SW note, he has been off of his medications for months. Pt reports having suicidal thoughts, without any plans or intentions, once a week. Pt reports the reason he feels suicidal is because he wants to get revenge on his \"baby momma. \" Pt reports a hx of suicide attempts but he was unable to provide further
[place] [facility] [phone] and was advised they are still reviewing pt referral.    Pt has approached the JUAN office several times asking about his discharge. JUAN advised pt that SW will update him once the crisis unit has made a decision. Pt stated it has been hours and he wants to discharge. JUAN again advised pt that SW will keep him updated.
JUAN contacted pt post discharge for follow up call. Pt was unable to be reached at this time.  KIRILL left
JUAN faxed a referral to the [facility].
Pt approached SW office stating that he has community service on Monday and he will not be there. Pt stated that he would like SW to call the [facility] to inform them that he is currently in the hospital. Pt stated that he is not assigned a . JUAN called [employer]: [phone] and was transferred to the probation office. JUAN left a message for chief probation Mary Solorzano requesting a call back to discuss the pt's community service.
Pt has approached SW multiple times this weekend requesting to be transferred to the crisis unit. SW informed pt that discharge date is not set for him at this time, but that SW may be able to send referral when discharge date is set. Pt is not accepting of this information and continues to insist on going to the crisis unit for further treatment prior to returning to work.
Pt was seen during treatment team. Pt reports feeling good today, denied SI/HI/AVH. Pt is no longer expressing homicidal ideations towards his \"baby momma. \" Per collateral gained from previous inpatient admissions, pt does not have a \"baby momma\" or any children. The treatment team feels this is a concrete and fixed delusion. Pt hopes to discharge to the [facility] today. Pt has applied for an apartment in [place] but he can also stay with his friend Sasha Galvin in [place] at time of discharge from the crisis unit. NINA denied, stating everyone is very busy. Pt will continue to treat with [facility] at time of discharge. Pt cooperative, bright, pleasant, discharge focused, with good eye contact, clear speech, fair insight/judgement. JUAN contacted [facility] [phone] and was advised they cannot schedule any appointments until pt discharges from the [facility]. JUAN advocated for medication management appointments to be scheduled as the pt is scheduled to get an BONILLA today. JUAN was advised they cannot schedule any appointments until time of discharge from the crisis unit. JUAN received a call from Halima with [facility] [phone]. Halima stated pt should present to them at time of discharge to continue his community service. JUAN received a voicemail from pt grandmother requesting a call from [address] [place] [address] stated herself and pt  have been trying to track the pt down because he keeps running away and there is a warrant out for his arrest. Pt grandmother can be reached at [phone].  At this time there is no release of information signed and pt declined signing a release of information for anyone during treatment team.
home with his grandma instead.      In order to ensure appropriate transition and discharge planning is in place, the following documents have been transmitted to [facility], as the new outpatient provider:    The d/c diagnosis was transmitted to the next care provider  The reason for hospitalization was transmitted to the next care provider  The d/c medications (dosage and indication) were transmitted to the next care provider   The continuing care plan was transmitted to the next care provider

## 2023-11-07 ENCOUNTER — HOSPITAL ENCOUNTER (EMERGENCY)
Age: 18
Discharge: HOME | End: 2023-11-08
Attending: EMERGENCY MEDICINE
Payer: COMMERCIAL

## 2023-11-07 DIAGNOSIS — Z91.148 NONCOMPLIANCE WITH MEDICATION REGIMEN: ICD-10-CM

## 2023-11-07 DIAGNOSIS — Z76.89 ENCOUNTER FOR PSYCHIATRIC ASSESSMENT: Primary | ICD-10-CM

## 2023-11-07 PROCEDURE — 99284 EMERGENCY DEPT VISIT MOD MDM: CPT

## 2023-11-07 ASSESSMENT — PATIENT HEALTH QUESTIONNAIRE - PHQ9
SUM OF ALL RESPONSES TO PHQ QUESTIONS 1-9: 0
SUM OF ALL RESPONSES TO PHQ9 QUESTIONS 1 & 2: 0
1. LITTLE INTEREST OR PLEASURE IN DOING THINGS: 0
SUM OF ALL RESPONSES TO PHQ QUESTIONS 1-9: 0
SUM OF ALL RESPONSES TO PHQ QUESTIONS 1-9: 0
2. FEELING DOWN, DEPRESSED OR HOPELESS: 0
SUM OF ALL RESPONSES TO PHQ QUESTIONS 1-9: 0

## 2023-11-07 ASSESSMENT — PAIN - FUNCTIONAL ASSESSMENT: PAIN_FUNCTIONAL_ASSESSMENT: NONE - DENIES PAIN

## 2023-11-08 ENCOUNTER — HOSPITAL ENCOUNTER (EMERGENCY)
Age: 18
Discharge: HOME | End: 2023-11-08
Payer: COMMERCIAL

## 2023-11-08 ENCOUNTER — APPOINTMENT (OUTPATIENT)
Dept: GENERAL RADIOLOGY | Age: 18
End: 2023-11-08
Payer: COMMERCIAL

## 2023-11-08 VITALS
DIASTOLIC BLOOD PRESSURE: 80 MMHG | BODY MASS INDEX: 20.73 KG/M2 | RESPIRATION RATE: 16 BRPM | HEART RATE: 82 BPM | SYSTOLIC BLOOD PRESSURE: 116 MMHG | TEMPERATURE: 98.5 F | WEIGHT: 140 LBS | OXYGEN SATURATION: 97 % | HEIGHT: 69 IN

## 2023-11-08 VITALS
RESPIRATION RATE: 16 BRPM | SYSTOLIC BLOOD PRESSURE: 110 MMHG | OXYGEN SATURATION: 99 % | DIASTOLIC BLOOD PRESSURE: 70 MMHG | WEIGHT: 140 LBS | HEART RATE: 89 BPM | TEMPERATURE: 98.2 F | BODY MASS INDEX: 20.73 KG/M2 | HEIGHT: 69 IN

## 2023-11-08 DIAGNOSIS — M25.561 CHRONIC PAIN OF RIGHT KNEE: Primary | ICD-10-CM

## 2023-11-08 DIAGNOSIS — S80.01XA CONTUSION OF RIGHT KNEE, INITIAL ENCOUNTER: ICD-10-CM

## 2023-11-08 DIAGNOSIS — Z76.0 ENCOUNTER FOR MEDICATION REFILL: ICD-10-CM

## 2023-11-08 DIAGNOSIS — G89.29 CHRONIC PAIN OF RIGHT KNEE: Primary | ICD-10-CM

## 2023-11-08 LAB
ALBUMIN SERPL-MCNC: 4 G/DL (ref 3.5–5.2)
ALP SERPL-CCNC: 85 U/L (ref 40–129)
ALT SERPL-CCNC: 17 U/L (ref 0–40)
AMPHET UR QL SCN: NEGATIVE
ANION GAP SERPL CALCULATED.3IONS-SCNC: 12 MMOL/L (ref 7–16)
APAP SERPL-MCNC: <5 UG/ML (ref 10–30)
AST SERPL-CCNC: 16 U/L (ref 0–39)
BARBITURATES UR QL SCN: NEGATIVE
BASOPHILS # BLD: 0.06 K/UL (ref 0–0.2)
BASOPHILS NFR BLD: 1 % (ref 0–2)
BENZODIAZ UR QL: NEGATIVE
BILIRUB SERPL-MCNC: 0.2 MG/DL (ref 0–1.2)
BUN SERPL-MCNC: 16 MG/DL (ref 6–20)
BUPRENORPHINE UR QL: NEGATIVE
CALCIUM SERPL-MCNC: 9.1 MG/DL (ref 8.6–10.2)
CANNABINOIDS UR QL SCN: POSITIVE
CHLORIDE SERPL-SCNC: 102 MMOL/L (ref 98–107)
CO2 SERPL-SCNC: 25 MMOL/L (ref 22–29)
COCAINE UR QL SCN: NEGATIVE
CREAT SERPL-MCNC: 0.8 MG/DL (ref 0.7–1.2)
DATE LAST DOSE: ABNORMAL
EKG ATRIAL RATE: 78 BPM
EKG P AXIS: 62 DEGREES
EKG P-R INTERVAL: 142 MS
EKG Q-T INTERVAL: 360 MS
EKG QRS DURATION: 80 MS
EKG QTC CALCULATION (BAZETT): 410 MS
EKG R AXIS: 66 DEGREES
EKG T AXIS: 63 DEGREES
EKG VENTRICULAR RATE: 78 BPM
EOSINOPHIL # BLD: 0.88 K/UL (ref 0.05–0.5)
EOSINOPHILS RELATIVE PERCENT: 10 % (ref 0–6)
ERYTHROCYTE [DISTWIDTH] IN BLOOD BY AUTOMATED COUNT: 12 % (ref 11.5–15)
ETHANOLAMINE SERPL-MCNC: <10 MG/DL
FENTANYL UR QL: NEGATIVE
GFR SERPL CREATININE-BSD FRML MDRD: >60 ML/MIN/1.73M2
GLUCOSE SERPL-MCNC: 95 MG/DL (ref 74–99)
HCT VFR BLD AUTO: 48.3 % (ref 37–54)
HGB BLD-MCNC: 16.1 G/DL (ref 12.5–16.5)
IMM GRANULOCYTES # BLD AUTO: <0.03 K/UL (ref 0–0.58)
IMM GRANULOCYTES NFR BLD: 0 % (ref 0–5)
LYMPHOCYTES NFR BLD: 3.41 K/UL (ref 1.5–4)
LYMPHOCYTES RELATIVE PERCENT: 40 % (ref 20–42)
MCH RBC QN AUTO: 29.4 PG (ref 26–35)
MCHC RBC AUTO-ENTMCNC: 33.3 G/DL (ref 32–34.5)
MCV RBC AUTO: 88.3 FL (ref 80–99.9)
METHADONE UR QL: NEGATIVE
MONOCYTES NFR BLD: 0.71 K/UL (ref 0.1–0.95)
MONOCYTES NFR BLD: 8 % (ref 2–12)
NEUTROPHILS NFR BLD: 40 % (ref 43–80)
NEUTS SEG NFR BLD: 3.39 K/UL (ref 1.8–7.3)
OPIATES UR QL SCN: NEGATIVE
OXYCODONE UR QL SCN: NEGATIVE
PCP UR QL SCN: NEGATIVE
PLATELET # BLD AUTO: 198 K/UL (ref 130–450)
PMV BLD AUTO: 8.9 FL (ref 7–12)
POTASSIUM SERPL-SCNC: 4.1 MMOL/L (ref 3.5–5)
PROT SERPL-MCNC: 6.3 G/DL (ref 6.4–8.3)
RBC # BLD AUTO: 5.47 M/UL (ref 3.8–5.8)
SALICYLATES SERPL-MCNC: <0.3 MG/DL (ref 0–30)
SODIUM SERPL-SCNC: 139 MMOL/L (ref 132–146)
TEST INFORMATION: ABNORMAL
TME LAST DOSE: ABNORMAL H
TOXIC TRICYCLIC SC,BLOOD: NEGATIVE
VALPROATE SERPL-MCNC: <3 UG/ML (ref 50–100)
VANCOMYCIN DOSE: ABNORMAL MG
WBC OTHER # BLD: 8.5 K/UL (ref 4.5–11.5)

## 2023-11-08 PROCEDURE — 73560 X-RAY EXAM OF KNEE 1 OR 2: CPT

## 2023-11-08 PROCEDURE — 80143 DRUG ASSAY ACETAMINOPHEN: CPT

## 2023-11-08 PROCEDURE — G0480 DRUG TEST DEF 1-7 CLASSES: HCPCS

## 2023-11-08 PROCEDURE — 99283 EMERGENCY DEPT VISIT LOW MDM: CPT

## 2023-11-08 PROCEDURE — 93005 ELECTROCARDIOGRAM TRACING: CPT | Performed by: EMERGENCY MEDICINE

## 2023-11-08 PROCEDURE — 80179 DRUG ASSAY SALICYLATE: CPT

## 2023-11-08 PROCEDURE — 80053 COMPREHEN METABOLIC PANEL: CPT

## 2023-11-08 PROCEDURE — 85025 COMPLETE CBC W/AUTO DIFF WBC: CPT

## 2023-11-08 PROCEDURE — 80164 ASSAY DIPROPYLACETIC ACD TOT: CPT

## 2023-11-08 PROCEDURE — 93010 ELECTROCARDIOGRAM REPORT: CPT | Performed by: INTERNAL MEDICINE

## 2023-11-08 PROCEDURE — 80307 DRUG TEST PRSMV CHEM ANLYZR: CPT

## 2023-11-08 RX ORDER — DIVALPROEX SODIUM 250 MG/1
250 TABLET, DELAYED RELEASE ORAL EVERY 12 HOURS SCHEDULED
Qty: 20 TABLET | Refills: 0 | Status: SHIPPED | OUTPATIENT
Start: 2023-11-08 | End: 2023-11-18

## 2023-11-08 RX ORDER — ACETAMINOPHEN 325 MG/1
650 TABLET ORAL ONCE
Status: DISCONTINUED | OUTPATIENT
Start: 2023-11-08 | End: 2023-11-09 | Stop reason: HOSPADM

## 2023-11-08 RX ORDER — DIVALPROEX SODIUM 250 MG/1
250 TABLET, DELAYED RELEASE ORAL EVERY 12 HOURS SCHEDULED
Qty: 60 TABLET | Refills: 0 | Status: SHIPPED | OUTPATIENT
Start: 2023-11-08 | End: 2023-11-08 | Stop reason: SDUPTHER

## 2023-11-08 ASSESSMENT — PAIN DESCRIPTION - DESCRIPTORS: DESCRIPTORS: ACHING;SHOOTING;DISCOMFORT

## 2023-11-08 ASSESSMENT — PAIN - FUNCTIONAL ASSESSMENT
PAIN_FUNCTIONAL_ASSESSMENT: NONE - DENIES PAIN
PAIN_FUNCTIONAL_ASSESSMENT: 0-10
PAIN_FUNCTIONAL_ASSESSMENT: NONE - DENIES PAIN

## 2023-11-08 ASSESSMENT — PAIN DESCRIPTION - LOCATION: LOCATION: KNEE

## 2023-11-08 ASSESSMENT — PAIN SCALES - GENERAL: PAINLEVEL_OUTOF10: 10

## 2023-11-08 ASSESSMENT — PAIN DESCRIPTION - ORIENTATION: ORIENTATION: RIGHT

## 2023-11-08 NOTE — ED PROVIDER NOTES
SUBJECTIVE  Chichi Noble is 55 y.o. female  Uncorrected distance visual acuity was 20/25 -2 in the right eye and not recorded in the left eye. Corrected distance visual acuity was not recorded in the right eye and 20/50 in the left eye.   Chief Complaint   Patient presents with    Annual Exam     Pt here for annual exam. No pain or discomfort. VA stable. No gtts.           HPI     Annual Exam      Additional comments: Pt here for annual exam. No pain or discomfort. VA   stable. No gtts.               Comments     1. PCIOL OD 4/3/14   PCIOL OS +11.5 SN60WF /-2.00 /7-31-19-NEAR (monovision)   2. Grave's Disease with Exophthalmos-Both eyes-Not contributing to vision   loss   3. Refractive Error   4. Dry Eye   5. Irregular Astigmatism - H/O RGP Wear   6. Corneal Abrasion OS   MARCO A concretion removal CPG 1/20, 3/21   *Mirna saw and thought it was from proptosis OS, SCL VS. Decompression     Systane ointment at night          Last edited by Chuck Norton MA on 3/15/2022  8:57 AM. (History)         Assessment /Plan :  1. Dry eye  -- Condition stable, no therapeutic change required. Monitoring routinely.     2. Pseudophakia of both eyes  -- Condition stable, no therapeutic change required. Monitoring routinely.       RTC in 1 year or prn any changes           results, in addition to providing specific details for the plan of care and counseling regarding the diagnosis and prognosis. Questions are answered at this time and they are agreeable with the plan.       --------------------------------- IMPRESSION AND DISPOSITION ---------------------------------    IMPRESSION  1. Encounter for psychiatric assessment    2. Noncompliance with medication regimen        DISPOSITION  Disposition:  to assist with disposition  Patient condition is stable        NOTE: This report was transcribed using voice recognition software.  Every effort was made to ensure accuracy; however, inadvertent computerized transcription errors may be present         Alissa Avila MD  11/07/23 5441       Alissa Avila MD  11/08/23 0157

## 2023-11-08 NOTE — ED NOTES
DR. Palma Power IN TO TALK WITH NOAM Nielsen, MURRAY  11/08/23 7858
Dr Shyann Castillo on site assessing clayton Lal, RN  11/08/23 9517
PT DECIDED THAT HE NO LONGER WANTS TO GO TO CSU AND PREFERS TO FOLLOW UP WITH [facility]    DISCUSSED WITH DR Garrett Hylton, Irasema Canseco, LSW  11/08/23 6126
Patient awake and alert and oriented. Not in any distress, not suicidal or homicidal.    Asking for his Depakote which she says he does not have. We discussed the dosing and sent to the pharmacy for him. Understands return to the ED for any problems or any worsening.      Fadi Rocha DO  11/08/23 1058
Pt agreeable to discharge and contracts for safety     Renetta Andrade RN  11/08/23 3163
Pt arrived through EMS calm cooperative and changed, a bag of belongings in locker 26 offered blankets.
Pt given a breakfast tray     Jef Benítez  11/08/23 0849
Pt has been referred to csu - awaiting acceptance      MURRAY Jameson  11/08/23 5257
Yes, When and Where:  [x] No    Legal Issues:  []  Yes (Specify)  [x]  No    Access to Weapons:  []  Yes (Specify)  [x]  No    Trauma History:  [] Reports:  [x] Denies     Living Situation:  at home with family    Employment:  self employed    Education Level:  some HS    Violence Risk Screening:        Have you ever thought about hurting someone? [x]  No  []  Yes (Ask the questions listed below)   When? Did you follow through with the thoughts? [] No     [] Yes- When and what happened? 2.  Have you ever threatened anyone? [x]  No  []  Yes (Ask the questions listed below)   When and what happened? Have you ever threatened someone with a gun, knife or other weapon? []  No  []  Yes - When and what happened? 2. Have you ever had an order of protection taken out against you? []  Yes [x]  No  3. Have you ever been arrested due to violence? []  Yes [x]  No  4. Have you ever been cruel to animals? []  Yes [x]  No    After consideration of C-SSRS screening results, C-SSRS assessments, and this professional's assessment the patient's overall suicide risk assessed to be:  [x] No Risk  [] Low   [] Moderate   [] High     [x] Discussed current suicide risk, protective and risk factors with RN and ED Physician. Consulted with ED Physician.  Disposition/level of care recommended at this time:  [] Home:   [] Outpatient Provider:   [x] Crisis Unit:   [] Inpatient Psychiatric Unit:  [] Other:                    Jose Luis Guerra  11/08/23 2419

## 2023-11-08 NOTE — DISCHARGE INSTRUCTIONS
FOLLOW UP WITH:    WALK IN HOURS 11AM - 130PM    [facility]  Location: Andria Warner, [address]   Phone number: [phone]  Fax number: [phone]       Help Hotline 598 384-1665 or 1-608.318.8500 or 211   24 hour crisis line for the following counties  [address]  [facility]    PEER WARM LINE: [phone]  Monday through Friday 4pm to 8pm and Saturday 1pm-3pm   You can call during these times to talk with a peer support person as needed

## 2023-11-09 ENCOUNTER — HOSPITAL ENCOUNTER (EMERGENCY)
Age: 18
Discharge: HOME | End: 2023-11-09
Payer: COMMERCIAL

## 2023-11-09 VITALS
TEMPERATURE: 97.5 F | SYSTOLIC BLOOD PRESSURE: 109 MMHG | HEART RATE: 101 BPM | HEIGHT: 69 IN | WEIGHT: 140 LBS | BODY MASS INDEX: 20.73 KG/M2 | RESPIRATION RATE: 18 BRPM | OXYGEN SATURATION: 97 % | DIASTOLIC BLOOD PRESSURE: 80 MMHG

## 2023-11-09 DIAGNOSIS — M79.604 RIGHT LEG PAIN: Primary | ICD-10-CM

## 2023-11-09 PROCEDURE — 6370000000 HC RX 637 (ALT 250 FOR IP): Performed by: NURSE PRACTITIONER

## 2023-11-09 PROCEDURE — 99283 EMERGENCY DEPT VISIT LOW MDM: CPT

## 2023-11-09 RX ORDER — HYDROCODONE BITARTRATE AND ACETAMINOPHEN 5; 325 MG/1; MG/1
1 TABLET ORAL ONCE
Status: COMPLETED | OUTPATIENT
Start: 2023-11-09 | End: 2023-11-09

## 2023-11-09 RX ADMIN — HYDROCODONE BITARTRATE AND ACETAMINOPHEN 1 TABLET: 5; 325 TABLET ORAL at 21:24

## 2023-11-09 ASSESSMENT — PAIN SCALES - GENERAL: PAINLEVEL_OUTOF10: 10

## 2023-11-09 ASSESSMENT — PAIN DESCRIPTION - FREQUENCY: FREQUENCY: CONTINUOUS

## 2023-11-09 ASSESSMENT — PATIENT HEALTH QUESTIONNAIRE - PHQ9
SUM OF ALL RESPONSES TO PHQ QUESTIONS 1-9: 0
SUM OF ALL RESPONSES TO PHQ QUESTIONS 1-9: 0
2. FEELING DOWN, DEPRESSED OR HOPELESS: 0
SUM OF ALL RESPONSES TO PHQ QUESTIONS 1-9: 0
SUM OF ALL RESPONSES TO PHQ QUESTIONS 1-9: 0
1. LITTLE INTEREST OR PLEASURE IN DOING THINGS: 0
SUM OF ALL RESPONSES TO PHQ9 QUESTIONS 1 & 2: 0

## 2023-11-09 ASSESSMENT — PAIN DESCRIPTION - PAIN TYPE: TYPE: ACUTE PAIN;CHRONIC PAIN

## 2023-11-09 ASSESSMENT — PAIN DESCRIPTION - ORIENTATION: ORIENTATION: RIGHT

## 2023-11-09 ASSESSMENT — LIFESTYLE VARIABLES: HOW MANY STANDARD DRINKS CONTAINING ALCOHOL DO YOU HAVE ON A TYPICAL DAY: PATIENT DOES NOT DRINK

## 2023-11-09 ASSESSMENT — PAIN DESCRIPTION - DESCRIPTORS: DESCRIPTORS: STABBING

## 2023-11-09 ASSESSMENT — PAIN DESCRIPTION - ONSET: ONSET: ON-GOING

## 2023-11-09 ASSESSMENT — PAIN DESCRIPTION - LOCATION: LOCATION: LEG

## 2023-11-09 ASSESSMENT — PAIN - FUNCTIONAL ASSESSMENT: PAIN_FUNCTIONAL_ASSESSMENT: 0-10

## 2023-11-09 NOTE — ED PROVIDER NOTES
Independent CHRISTOPHER Visit. [address] ENCOUNTER        Pt Name: Elan Ridley  MRN: [id number]  [address] [facility] 2005  Date of evaluation: 11/8/2023  Provider: NAN Conn CNP  PCP: No primary care provider on file. Note Started: 10:05 PM EST 11/8/23      CHRISTOPHER. I have evaluated this patient. CHIEF COMPLAINT       Chief Complaint   Patient presents with    Knee Pain     Right up stairs and injured right knee, difficulty walking on knee       HISTORY OF PRESENT ILLNESS: 1 or more Elements     History from : Patient    Limitations to history : None    Elan Ridley is a 19 y.o. male who presents to the ed vial ambulance for right knee pain. Patient states that she was walking up steps when he tripped and hit his right knee. Patient states that he had surgery on the right knee 1 year ago and is concerned that he may have re injured his knee. He states that  he is able to walk but is having some pain patient denies any head injury or loc he denies any alcohol consumption or drug use today. He states that he was seen in the ed this am and needs a new prescription for Depakote as his pharmacy did not have the medications. Nursing Notes were all reviewed and agreed with or any disagreements were addressed in the HPI. REVIEW OF SYSTEMS :      Review of Systems    Positives and Pertinent negatives as per HPI. SURGICAL HISTORY   History reviewed. No pertinent surgical history. [facility]       Discharge Medication List as of 11/8/2023  9:28 PM        CONTINUE these medications which have NOT CHANGED    Details   risperiDONE ER (PERSERIS) 120 MG PRSY subcutaneous injection Inject 0.8 mLs into the skin every 30 days for 1 dose Last injection is given 10/30/2023, Disp-1 each, R-0Normal             ALLERGIES     Reading    FAMILYHISTORY     History reviewed. No pertinent family history.      SOCIAL HISTORY

## 2023-11-09 NOTE — ED NOTES
Discharge instructions reviewed , including diagnosis, medications, follow up appointments, home care, and also when to call 911. All discharge instructions questions answered.        Pt left ED ambulatory       Anne Marroquin RN  11/08/23 4467

## 2023-11-10 ENCOUNTER — HOSPITAL ENCOUNTER (EMERGENCY)
Age: 18
Discharge: HOME | End: 2023-11-10
Payer: COMMERCIAL

## 2023-11-10 VITALS
TEMPERATURE: 97.8 F | OXYGEN SATURATION: 98 % | DIASTOLIC BLOOD PRESSURE: 92 MMHG | HEART RATE: 91 BPM | SYSTOLIC BLOOD PRESSURE: 122 MMHG | RESPIRATION RATE: 16 BRPM

## 2023-11-10 DIAGNOSIS — K08.89 PAIN, DENTAL: Primary | ICD-10-CM

## 2023-11-10 DIAGNOSIS — K02.9 DENTAL DECAY: ICD-10-CM

## 2023-11-10 DIAGNOSIS — S02.5XXA CLOSED FRACTURE OF TOOTH, INITIAL ENCOUNTER: ICD-10-CM

## 2023-11-10 PROCEDURE — 99283 EMERGENCY DEPT VISIT LOW MDM: CPT

## 2023-11-10 PROCEDURE — 6370000000 HC RX 637 (ALT 250 FOR IP): Performed by: NURSE PRACTITIONER

## 2023-11-10 RX ORDER — LIDOCAINE HYDROCHLORIDE 20 MG/ML
15 SOLUTION OROPHARYNGEAL ONCE
Status: COMPLETED | OUTPATIENT
Start: 2023-11-10 | End: 2023-11-10

## 2023-11-10 RX ORDER — IBUPROFEN 800 MG/1
800 TABLET ORAL EVERY 8 HOURS PRN
Qty: 21 TABLET | Refills: 0 | Status: SHIPPED | OUTPATIENT
Start: 2023-11-10 | End: 2023-11-17

## 2023-11-10 RX ORDER — AMOXICILLIN 250 MG/1
500 CAPSULE ORAL ONCE
Status: COMPLETED | OUTPATIENT
Start: 2023-11-10 | End: 2023-11-10

## 2023-11-10 RX ORDER — IBUPROFEN 800 MG/1
800 TABLET ORAL ONCE
Status: DISCONTINUED | OUTPATIENT
Start: 2023-11-10 | End: 2023-11-10 | Stop reason: HOSPADM

## 2023-11-10 RX ORDER — AMOXICILLIN 500 MG/1
500 CAPSULE ORAL 3 TIMES DAILY
Qty: 21 CAPSULE | Refills: 0 | Status: SHIPPED | OUTPATIENT
Start: 2023-11-10 | End: 2023-11-17

## 2023-11-10 RX ORDER — LIDOCAINE HYDROCHLORIDE 20 MG/ML
15 SOLUTION OROPHARYNGEAL
Qty: 100 ML | Refills: 0 | Status: SHIPPED | OUTPATIENT
Start: 2023-11-10

## 2023-11-10 RX ADMIN — AMOXICILLIN 500 MG: 250 CAPSULE ORAL at 18:52

## 2023-11-10 RX ADMIN — LIDOCAINE HYDROCHLORIDE 15 ML: 20 SOLUTION ORAL at 18:52

## 2023-11-10 NOTE — DISCHARGE INSTRUCTIONS
Take medication as prescribed, please follow-up with the dental clinic, it is first come first serve and open Monday through Friday at 8 AM.  Just walk-in but be prepared to wait for you can make an appointment as well. Do not take anyone else's antibiotics.

## 2023-11-10 NOTE — DISCHARGE INSTRUCTIONS
Take Motrin or Tylenol for pain relief. Follow-up with your orthopedic surgeon. Wear knee brace for additional comfort.

## 2023-11-11 ENCOUNTER — HOSPITAL ENCOUNTER (EMERGENCY)
Age: 18
Discharge: HOME | End: 2023-11-12
Attending: STUDENT IN AN ORGANIZED HEALTH CARE EDUCATION/TRAINING PROGRAM
Payer: COMMERCIAL

## 2023-11-11 DIAGNOSIS — Z59.00 HOMELESSNESS: Primary | ICD-10-CM

## 2023-11-11 LAB
ALBUMIN SERPL-MCNC: 4.8 G/DL (ref 3.5–5.2)
ALP SERPL-CCNC: 82 U/L (ref 40–129)
ALT SERPL-CCNC: 17 U/L (ref 0–40)
AMPHET UR QL SCN: NEGATIVE
ANION GAP SERPL CALCULATED.3IONS-SCNC: 13 MMOL/L (ref 7–16)
APAP SERPL-MCNC: <5 UG/ML (ref 10–30)
AST SERPL-CCNC: 21 U/L (ref 0–39)
BARBITURATES UR QL SCN: NEGATIVE
BASOPHILS # BLD: 0.05 K/UL (ref 0–0.2)
BASOPHILS NFR BLD: 1 % (ref 0–2)
BENZODIAZ UR QL: NEGATIVE
BILIRUB SERPL-MCNC: 0.4 MG/DL (ref 0–1.2)
BUN SERPL-MCNC: 10 MG/DL (ref 6–20)
BUPRENORPHINE UR QL: NEGATIVE
CALCIUM SERPL-MCNC: 9.4 MG/DL (ref 8.6–10.2)
CANNABINOIDS UR QL SCN: POSITIVE
CHLORIDE SERPL-SCNC: 103 MMOL/L (ref 98–107)
CO2 SERPL-SCNC: 25 MMOL/L (ref 22–29)
COCAINE UR QL SCN: NEGATIVE
CREAT SERPL-MCNC: 0.9 MG/DL (ref 0.7–1.2)
EOSINOPHIL # BLD: 0.67 K/UL (ref 0.05–0.5)
EOSINOPHILS RELATIVE PERCENT: 7 % (ref 0–6)
ERYTHROCYTE [DISTWIDTH] IN BLOOD BY AUTOMATED COUNT: 12.3 % (ref 11.5–15)
ETHANOLAMINE SERPL-MCNC: <10 MG/DL
FENTANYL UR QL: NEGATIVE
GFR SERPL CREATININE-BSD FRML MDRD: >60 ML/MIN/1.73M2
GLUCOSE SERPL-MCNC: 88 MG/DL (ref 74–99)
HCT VFR BLD AUTO: 52 % (ref 37–54)
HGB BLD-MCNC: 17.1 G/DL (ref 12.5–16.5)
IMM GRANULOCYTES # BLD AUTO: 0.03 K/UL (ref 0–0.58)
IMM GRANULOCYTES NFR BLD: 0 % (ref 0–5)
LYMPHOCYTES NFR BLD: 3.03 K/UL (ref 1.5–4)
LYMPHOCYTES RELATIVE PERCENT: 33 % (ref 20–42)
MCH RBC QN AUTO: 29.6 PG (ref 26–35)
MCHC RBC AUTO-ENTMCNC: 32.9 G/DL (ref 32–34.5)
MCV RBC AUTO: 90 FL (ref 80–99.9)
METHADONE UR QL: NEGATIVE
MONOCYTES NFR BLD: 0.58 K/UL (ref 0.1–0.95)
MONOCYTES NFR BLD: 6 % (ref 2–12)
NEUTROPHILS NFR BLD: 53 % (ref 43–80)
NEUTS SEG NFR BLD: 4.81 K/UL (ref 1.8–7.3)
OPIATES UR QL SCN: NEGATIVE
OXYCODONE UR QL SCN: NEGATIVE
PCP UR QL SCN: NEGATIVE
PLATELET # BLD AUTO: 231 K/UL (ref 130–450)
PMV BLD AUTO: 8.7 FL (ref 7–12)
POTASSIUM SERPL-SCNC: 4.1 MMOL/L (ref 3.5–5)
PROT SERPL-MCNC: 7.5 G/DL (ref 6.4–8.3)
RBC # BLD AUTO: 5.78 M/UL (ref 3.8–5.8)
SALICYLATES SERPL-MCNC: 1.1 MG/DL (ref 0–30)
SODIUM SERPL-SCNC: 141 MMOL/L (ref 132–146)
TEST INFORMATION: ABNORMAL
TOXIC TRICYCLIC SC,BLOOD: NEGATIVE
WBC OTHER # BLD: 9.2 K/UL (ref 4.5–11.5)

## 2023-11-11 PROCEDURE — 80307 DRUG TEST PRSMV CHEM ANLYZR: CPT

## 2023-11-11 PROCEDURE — 80143 DRUG ASSAY ACETAMINOPHEN: CPT

## 2023-11-11 PROCEDURE — 80179 DRUG ASSAY SALICYLATE: CPT

## 2023-11-11 PROCEDURE — 80053 COMPREHEN METABOLIC PANEL: CPT

## 2023-11-11 PROCEDURE — 93005 ELECTROCARDIOGRAM TRACING: CPT | Performed by: STUDENT IN AN ORGANIZED HEALTH CARE EDUCATION/TRAINING PROGRAM

## 2023-11-11 PROCEDURE — G0480 DRUG TEST DEF 1-7 CLASSES: HCPCS

## 2023-11-11 PROCEDURE — 85025 COMPLETE CBC W/AUTO DIFF WBC: CPT

## 2023-11-11 PROCEDURE — 99284 EMERGENCY DEPT VISIT MOD MDM: CPT

## 2023-11-11 SDOH — ECONOMIC STABILITY - HOUSING INSECURITY: HOMELESSNESS UNSPECIFIED: Z59.00

## 2023-11-12 ENCOUNTER — HOSPITAL ENCOUNTER (EMERGENCY)
Age: 18
Discharge: HOME | End: 2023-11-12
Payer: COMMERCIAL

## 2023-11-12 VITALS
RESPIRATION RATE: 18 BRPM | OXYGEN SATURATION: 98 % | HEART RATE: 86 BPM | TEMPERATURE: 97.8 F | DIASTOLIC BLOOD PRESSURE: 78 MMHG | SYSTOLIC BLOOD PRESSURE: 130 MMHG

## 2023-11-12 VITALS
TEMPERATURE: 98 F | BODY MASS INDEX: 20.73 KG/M2 | HEIGHT: 69 IN | HEART RATE: 85 BPM | DIASTOLIC BLOOD PRESSURE: 54 MMHG | WEIGHT: 140 LBS | OXYGEN SATURATION: 98 % | RESPIRATION RATE: 18 BRPM | SYSTOLIC BLOOD PRESSURE: 140 MMHG

## 2023-11-12 DIAGNOSIS — R51.9 NONINTRACTABLE HEADACHE, UNSPECIFIED CHRONICITY PATTERN, UNSPECIFIED HEADACHE TYPE: Primary | ICD-10-CM

## 2023-11-12 PROCEDURE — 6370000000 HC RX 637 (ALT 250 FOR IP): Performed by: PHYSICIAN ASSISTANT

## 2023-11-12 PROCEDURE — 99283 EMERGENCY DEPT VISIT LOW MDM: CPT

## 2023-11-12 RX ORDER — ACETAMINOPHEN 325 MG/1
650 TABLET ORAL ONCE
Status: COMPLETED | OUTPATIENT
Start: 2023-11-12 | End: 2023-11-12

## 2023-11-12 RX ORDER — IBUPROFEN 800 MG/1
800 TABLET ORAL ONCE
Status: COMPLETED | OUTPATIENT
Start: 2023-11-12 | End: 2023-11-12

## 2023-11-12 RX ADMIN — ACETAMINOPHEN 650 MG: 325 TABLET ORAL at 14:13

## 2023-11-12 RX ADMIN — IBUPROFEN 800 MG: 800 TABLET, FILM COATED ORAL at 14:14

## 2023-11-12 ASSESSMENT — PAIN - FUNCTIONAL ASSESSMENT: PAIN_FUNCTIONAL_ASSESSMENT: NONE - DENIES PAIN

## 2023-11-12 NOTE — ED NOTES
JUAN spoke to [facility] at [facility] who said they do have one male bed remaining. [facility] noted their fax machine is not working. [facility] said to use [address] one: [phone].      Jelena Carlin, MURRAY  11/12/23 3720

## 2023-11-12 NOTE — ED NOTES
Behavioral Health Crisis Assessment      Chief Complaint: \"I want to go to the crisis unit\"    Mental Status Exam: Patient was alert/oriented X3. Patient calm and cooperative. Patient with good eye contact. Thought processes organized. Patient denied having hallucinations. Patient denied having SI, denied having HI. Legal Status:  [x] Voluntary:  [] Involuntary, Issued by:    Gender:  [x] Male [] Female [] Transgender  [] Other    Sexual Orientation:  [x] Heterosexual [] Homosexual [] Bisexual [] Other    Brief Clinical Summary:    Patient is a 19year old male who presented to the ED as a walk-in. Patient expressed the desire for admission to [facility]. Patient explained he does not have his Depakote. Patient said he got a script for it from [facility] ED, said the pharmacy filled it but will not give it to him until the one he has runs out. Patient said it is lost so has been unable to take it for the last two weeks. Patient denied having hallucinations. Patient denied \"ever\" having SI in his life, denied HI. Patient diagnosis per chart is Bipolar affective disorder, manic, severe with psychotic behavior. Patient with a history of multiple [facility] admissions, last being on 10/26/23 for SI/HI.      Collateral Information:  Mental Health Diagnosis   Substance Use - marijuana  Non-compliant with meds due to losing them    Risk Factors:  Strong family/friend support   Initiated this ER visit  Help-seeking behavior  Good insight  Goals & Hope for the future  Safe and stable housing  Has access to essential needs  Good communication Skills  Semi-Steady income    Protective Factors:  Strong family/friend support   Initiated this ER visit  Help-seeking behavior  Good insight  [address] for the future  Safe and stable housing  Has access to essential needs  Good communication Skills  Semi-Steady income    Suicidal Ideations:  [] Reports:    [] Past [] Present   [x] Denies    Suicide Attempts:  [] Reports:   [x] Denies    C-SSRS

## 2023-11-12 NOTE — DISCHARGE INSTRUCTIONS
Outpatient [address] [facility] location  [place] [address]  Phone: [address]  [facility] [address]  Phone: [phone]    [address] [facility]  [facility] [address]  Phone: [phone]    [address]  location  [address] [place] [address]  Phone: [phone] [facility] location  [facility], [facility] [address] [facility] [address]  Phone: [phone] [employer] [address] [facility]. [address] [facility]  Phone: [address] [facility] [address]  [address] [place] [address]  Phone: [phone]      Help Network 688 181-0198 or 5-963.655.2854 or 211   24 hour crisis line for the following [facility]  [address]  [facility] [address] Wanda Mom    PEER WARM LINE: [phone]  Monday through Friday 4pm to 8pm and Saturday 1pm-3pm   You can call during these times to talk with a peer support person as needed

## 2023-11-12 NOTE — ED NOTES
Notified Dr. Nicolás Palomo, patient ok to discharge with resources provided. No criteria for inpatient psych - No SI/HI No A/V hallucinations. Patient declined at Crisis Unit. Patient presenting problem appears to be homelessness.     Electronically signed by Rocco Kussmaul, MSW, MURRAY on 11/12/2023 at 10:10 AM

## 2023-11-12 NOTE — ED NOTES
Patient requesting a ride to his friend's address:    [address] [place] [address] placed phone call to Enma Wilburn [phone] and spoke with Rep.     Scheduled transportation ETA [phone]    Ride #[id number]    Electronically signed by CHELSEY Brink, LSW on 11/12/2023 at 10:37 AM

## 2023-11-12 NOTE — ED NOTES
SW placed phone call to [facility] [phone]. Spoke with staff. Patient has been declined due to multiple referrals the past 1 week and patient never showing. Patient to discharge w/ outpatient resources and shelter information.     Electronically signed by Rocco Kussmaul, MSW, MURRAY on 11/12/2023 at 9:48 AM

## 2023-11-13 LAB
EKG ATRIAL RATE: 77 BPM
EKG P AXIS: 73 DEGREES
EKG P-R INTERVAL: 140 MS
EKG Q-T INTERVAL: 364 MS
EKG QRS DURATION: 72 MS
EKG QTC CALCULATION (BAZETT): 411 MS
EKG R AXIS: 70 DEGREES
EKG T AXIS: 55 DEGREES
EKG VENTRICULAR RATE: 77 BPM

## 2023-11-15 ENCOUNTER — HOSPITAL ENCOUNTER (EMERGENCY)
Age: 18
Discharge: LEFT BEFORE DISCHARGE | DRG: 753 | End: 2023-11-15
Attending: EMERGENCY MEDICINE
Payer: COMMERCIAL

## 2023-11-15 VITALS
DIASTOLIC BLOOD PRESSURE: 73 MMHG | WEIGHT: 140 LBS | TEMPERATURE: 98 F | RESPIRATION RATE: 18 BRPM | BODY MASS INDEX: 20.67 KG/M2 | HEART RATE: 102 BPM | OXYGEN SATURATION: 100 % | SYSTOLIC BLOOD PRESSURE: 117 MMHG

## 2023-11-15 DIAGNOSIS — R07.81 RIB PAIN: Primary | ICD-10-CM

## 2023-11-15 PROCEDURE — 99283 EMERGENCY DEPT VISIT LOW MDM: CPT

## 2023-11-15 PROCEDURE — 6370000000 HC RX 637 (ALT 250 FOR IP): Performed by: PHYSICIAN ASSISTANT

## 2023-11-15 RX ORDER — ACETAMINOPHEN 500 MG
1000 TABLET ORAL ONCE
Status: COMPLETED | OUTPATIENT
Start: 2023-11-15 | End: 2023-11-15

## 2023-11-15 RX ADMIN — ACETAMINOPHEN 1000 MG: 500 TABLET ORAL at 13:06

## 2023-11-15 ASSESSMENT — PAIN DESCRIPTION - PAIN TYPE: TYPE: ACUTE PAIN

## 2023-11-15 ASSESSMENT — PAIN SCALES - GENERAL
PAINLEVEL_OUTOF10: 9
PAINLEVEL_OUTOF10: 8

## 2023-11-15 ASSESSMENT — PAIN DESCRIPTION - FREQUENCY: FREQUENCY: CONTINUOUS

## 2023-11-15 ASSESSMENT — PAIN DESCRIPTION - ORIENTATION
ORIENTATION: LEFT
ORIENTATION: LEFT

## 2023-11-15 ASSESSMENT — PAIN DESCRIPTION - ONSET: ONSET: ON-GOING

## 2023-11-15 ASSESSMENT — PAIN DESCRIPTION - LOCATION
LOCATION: RIB CAGE
LOCATION: RIB CAGE

## 2023-11-15 ASSESSMENT — PAIN DESCRIPTION - DESCRIPTORS: DESCRIPTORS: ACHING;SORE

## 2023-11-15 ASSESSMENT — PAIN - FUNCTIONAL ASSESSMENT: PAIN_FUNCTIONAL_ASSESSMENT: 0-10

## 2023-11-15 ASSESSMENT — LIFESTYLE VARIABLES: HOW OFTEN DO YOU HAVE A DRINK CONTAINING ALCOHOL: MONTHLY OR LESS

## 2023-11-15 NOTE — ED NOTES
Dr. Supa Olmstead at bedside speaking to pt, explained concerns for bloodwork/images to rule out internal bleeding. Pt refuses. States he wants to go. Risks explained to pt, yet pt refuses for staff to complete any tests. Pt agreed to sign AMA form after explaining potential severity of condition and risk of leaving.             Silke Santana RN  11/15/23 7301

## 2023-11-15 NOTE — ED NOTES
Pt refusing blood work, stating \"I'm going to go somewhere else. \" \"I don't need any bloodwork\".  I just want an xray     Yao Ruby RN  11/15/23 6628

## 2023-11-17 ENCOUNTER — HOSPITAL ENCOUNTER (EMERGENCY)
Age: 18
Discharge: HOME | DRG: 753 | End: 2023-11-17
Attending: EMERGENCY MEDICINE
Payer: COMMERCIAL

## 2023-11-17 ENCOUNTER — HOSPITAL ENCOUNTER (INPATIENT)
Age: 18
LOS: 4 days | Discharge: HOME | DRG: 753 | End: 2023-11-22
Attending: EMERGENCY MEDICINE | Admitting: PSYCHIATRY & NEUROLOGY
Payer: COMMERCIAL

## 2023-11-17 VITALS
SYSTOLIC BLOOD PRESSURE: 113 MMHG | DIASTOLIC BLOOD PRESSURE: 80 MMHG | RESPIRATION RATE: 16 BRPM | OXYGEN SATURATION: 97 % | HEART RATE: 89 BPM | TEMPERATURE: 97.6 F

## 2023-11-17 DIAGNOSIS — F32.A DEPRESSION, UNSPECIFIED DEPRESSION TYPE: Primary | ICD-10-CM

## 2023-11-17 DIAGNOSIS — F39 MOOD DISORDER (HCC): Primary | ICD-10-CM

## 2023-11-17 LAB
ALBUMIN SERPL-MCNC: 4.7 G/DL (ref 3.5–5.2)
ALP SERPL-CCNC: 85 U/L (ref 40–129)
ALT SERPL-CCNC: 17 U/L (ref 0–40)
AMPHET UR QL SCN: NEGATIVE
AMPHET UR QL SCN: NEGATIVE
ANION GAP SERPL CALCULATED.3IONS-SCNC: 14 MMOL/L (ref 7–16)
APAP SERPL-MCNC: <5 UG/ML (ref 10–30)
APAP SERPL-MCNC: <5 UG/ML (ref 10–30)
AST SERPL-CCNC: 21 U/L (ref 0–39)
BARBITURATES UR QL SCN: NEGATIVE
BARBITURATES UR QL SCN: NEGATIVE
BASOPHILS # BLD: 0.04 K/UL (ref 0–0.2)
BASOPHILS NFR BLD: 1 % (ref 0–2)
BENZODIAZ UR QL: NEGATIVE
BENZODIAZ UR QL: NEGATIVE
BILIRUB SERPL-MCNC: 0.3 MG/DL (ref 0–1.2)
BUN SERPL-MCNC: 12 MG/DL (ref 6–20)
BUPRENORPHINE UR QL: NEGATIVE
BUPRENORPHINE UR QL: NEGATIVE
CALCIUM SERPL-MCNC: 9.7 MG/DL (ref 8.6–10.2)
CANNABINOIDS UR QL SCN: POSITIVE
CANNABINOIDS UR QL SCN: POSITIVE
CHLORIDE SERPL-SCNC: 103 MMOL/L (ref 98–107)
CO2 SERPL-SCNC: 23 MMOL/L (ref 22–29)
COCAINE UR QL SCN: NEGATIVE
COCAINE UR QL SCN: NEGATIVE
CREAT SERPL-MCNC: 0.9 MG/DL (ref 0.7–1.2)
DATE LAST DOSE: ABNORMAL
EOSINOPHIL # BLD: 0.55 K/UL (ref 0.05–0.5)
EOSINOPHILS RELATIVE PERCENT: 8 % (ref 0–6)
ERYTHROCYTE [DISTWIDTH] IN BLOOD BY AUTOMATED COUNT: 12.5 % (ref 11.5–15)
ETHANOLAMINE SERPL-MCNC: <10 MG/DL
ETHANOLAMINE SERPL-MCNC: <10 MG/DL
FENTANYL UR QL: NEGATIVE
FENTANYL UR QL: NEGATIVE
GFR SERPL CREATININE-BSD FRML MDRD: >60 ML/MIN/1.73M2
GLUCOSE SERPL-MCNC: 87 MG/DL (ref 74–99)
HCT VFR BLD AUTO: 51.2 % (ref 37–54)
HGB BLD-MCNC: 17.2 G/DL (ref 12.5–16.5)
IMM GRANULOCYTES # BLD AUTO: 0.03 K/UL (ref 0–0.58)
IMM GRANULOCYTES NFR BLD: 0 % (ref 0–5)
LYMPHOCYTES NFR BLD: 2.36 K/UL (ref 1.5–4)
LYMPHOCYTES RELATIVE PERCENT: 34 % (ref 20–42)
MCH RBC QN AUTO: 29.7 PG (ref 26–35)
MCHC RBC AUTO-ENTMCNC: 33.6 G/DL (ref 32–34.5)
MCV RBC AUTO: 88.3 FL (ref 80–99.9)
METHADONE UR QL: NEGATIVE
METHADONE UR QL: NEGATIVE
MONOCYTES NFR BLD: 0.47 K/UL (ref 0.1–0.95)
MONOCYTES NFR BLD: 7 % (ref 2–12)
NEUTROPHILS NFR BLD: 51 % (ref 43–80)
NEUTS SEG NFR BLD: 3.59 K/UL (ref 1.8–7.3)
OPIATES UR QL SCN: NEGATIVE
OPIATES UR QL SCN: NEGATIVE
OXYCODONE UR QL SCN: NEGATIVE
OXYCODONE UR QL SCN: NEGATIVE
PCP UR QL SCN: NEGATIVE
PCP UR QL SCN: NEGATIVE
PLATELET # BLD AUTO: 245 K/UL (ref 130–450)
PMV BLD AUTO: 8.9 FL (ref 7–12)
POTASSIUM SERPL-SCNC: 4.2 MMOL/L (ref 3.5–5)
PROT SERPL-MCNC: 7.1 G/DL (ref 6.4–8.3)
RBC # BLD AUTO: 5.8 M/UL (ref 3.8–5.8)
SALICYLATES SERPL-MCNC: <0.3 MG/DL (ref 0–30)
SALICYLATES SERPL-MCNC: <0.3 MG/DL (ref 0–30)
SODIUM SERPL-SCNC: 140 MMOL/L (ref 132–146)
TEST INFORMATION: ABNORMAL
TEST INFORMATION: ABNORMAL
TME LAST DOSE: ABNORMAL H
TOXIC TRICYCLIC SC,BLOOD: NEGATIVE
TOXIC TRICYCLIC SC,BLOOD: NEGATIVE
VALPROATE SERPL-MCNC: <3 UG/ML (ref 50–100)
VANCOMYCIN DOSE: ABNORMAL MG
WBC OTHER # BLD: 7 K/UL (ref 4.5–11.5)

## 2023-11-17 PROCEDURE — 80053 COMPREHEN METABOLIC PANEL: CPT

## 2023-11-17 PROCEDURE — 93005 ELECTROCARDIOGRAM TRACING: CPT | Performed by: EMERGENCY MEDICINE

## 2023-11-17 PROCEDURE — 99285 EMERGENCY DEPT VISIT HI MDM: CPT

## 2023-11-17 PROCEDURE — 80143 DRUG ASSAY ACETAMINOPHEN: CPT

## 2023-11-17 PROCEDURE — 80179 DRUG ASSAY SALICYLATE: CPT

## 2023-11-17 PROCEDURE — 80164 ASSAY DIPROPYLACETIC ACD TOT: CPT

## 2023-11-17 PROCEDURE — 99284 EMERGENCY DEPT VISIT MOD MDM: CPT

## 2023-11-17 PROCEDURE — G0480 DRUG TEST DEF 1-7 CLASSES: HCPCS

## 2023-11-17 PROCEDURE — 85025 COMPLETE CBC W/AUTO DIFF WBC: CPT

## 2023-11-17 PROCEDURE — 93005 ELECTROCARDIOGRAM TRACING: CPT | Performed by: PHYSICIAN ASSISTANT

## 2023-11-17 PROCEDURE — 80307 DRUG TEST PRSMV CHEM ANLYZR: CPT

## 2023-11-17 ASSESSMENT — LIFESTYLE VARIABLES
HOW OFTEN DO YOU HAVE A DRINK CONTAINING ALCOHOL: NEVER
HOW MANY STANDARD DRINKS CONTAINING ALCOHOL DO YOU HAVE ON A TYPICAL DAY: PATIENT DOES NOT DRINK
HOW OFTEN DO YOU HAVE A DRINK CONTAINING ALCOHOL: NEVER

## 2023-11-17 ASSESSMENT — PAIN - FUNCTIONAL ASSESSMENT: PAIN_FUNCTIONAL_ASSESSMENT: NONE - DENIES PAIN

## 2023-11-17 NOTE — ED NOTES
Behavioral Health Crisis Assessment      Chief Complaint: The pt is a 19 yr old white male who presents to the ED with depression and wanting to go to the CSU. Mental Status Exam: The pt presents cooperative but irritated at times. He is oriented times 4 and is a poor historian. He denied a hx of SI, HI, and AVH. He has fair judgement and insight. He has poor eye contact and hygiene. Legal Status:  [x] Voluntary:  [] Involuntary, Issued by:    Gender:  [x] Male [] Female [] Transgender  [] Other    Sexual Orientation:  [x] Heterosexual [] Homosexual [] Bisexual [] Other    Brief Clinical Summary:   The pt stated that he wants to go to the CSU to get back on his meds. He stated that he has been off his meds for 1 weeks. He stated that he does not have a NP appt until next month at [facility] and he is out of [facility]. He reports that his depression has been bad- he stated that he cannot sleep and has been feeling down and having mood swings. He denied a hx of SI, HI, AVH, and AOD (besides cannabis). He has a hx of multiple admits to [address] [place] [address] most recently- 10/26 and 1/23/23 and 8/20/22. He also reported that he has been on the CSU multiple times but could not remember the last time that he was. Although the pt stated that he is homeless- he has been staying with friends but wants to secure housing. He stated that his Daniella Gonzalez is his payee and he gets 900 a month but she has not attempted to secure housing for him. Call to [facility] the [facility]- she reported that the pt's ins will not pay for Depakote b/c he threw out his prescription. The pt stated that he understands this and still wants to be admitted there.     Collateral Information:  None     Risk Factors:  Poor med compliance     Poor tx compliance     Homeless     Disability     Lack of essential needs     Poor communication skills     Probation     Hx of Multiple psych admits       Protective Factors: Seeking help     Denied hx of SI     No []  Yes [x]  No    After consideration of C-SSRS screening results, C-SSRS assessments, and this professional's assessment the patient's overall suicide risk assessed to be:  [x] No Risk  [] Low   [] Moderate   [] High     [x] Discussed current suicide risk, protective and risk factors with RN and ED Physician. Consulted with ED Physician.  Disposition/level of care recommended at this time:  [] Home:   [] Outpatient Provider:   [x] Crisis Unit: Referred  [] Inpatient Psychiatric Unit:  [] Other:                    Rudolph White, [facility]  11/17/23 1538       Vanessa Henson, [facility]  11/17/23 1600

## 2023-11-17 NOTE — ED NOTES
Patient denies SI/HI wishes discharge to the crisis unit. Discharge instructions reviewed with the patient and he denies any questions.       Neal Chen RN  11/17/23 7102

## 2023-11-17 NOTE — ED PROVIDER NOTES
ED Attending  CC: No       [facility]  Department of Emergency Medicine   ED  Encounter Note  Admit Date/RoomTime: 2023  1:51 PM  ED Room: [address] [place] [address]    NAME: Beau Null  : 2005  MRN: [id number]     Chief Complaint:  Depression (Patient states it has been rough lately and is having increased depression. Hasnt been taking depression meds. )    History of Present Illness       Beau Null is a 19 y.o. old male who presents to the emergency department by private vehicle, for psychiatric evaluation due to depression which began a few day(s) prior to arrival. Pt reports not having his Depakote for 1 week. He has a prior history of bipolar and depression of which the problem is long-standing. His reported drug use history: Never. He  has previously been in counseling. There has been no history of recent trauma. He denies suicidal ideation and denies homicidal ideation. Denies fever/chills, HA, vision change, dizziness, cp, sob, abdominal pain, nvd, numbness/weakness. Quality:      Hopelessness:   No.     Terror:   No.     Confusion:   No.     Hallucinations:   None. Able to care for self: Yes. Able to control self: Yes. ROS   Pertinent positives and negatives are stated within HPI, all other systems reviewed and are negative. Past Medical History:  has a past medical history of ADHD (attention deficit hyperactivity disorder), Autism disorder, GERD (gastroesophageal reflux disease), Hypotonia, Intermittent explosive disorder, Microcephalic ([address]), and Schizoaffective disorder ([address]). Surgical History:  has no past surgical history on file. Social History:  reports that he has been smoking cigarettes. He has a 6.00 pack-year smoking history. He has never used smokeless tobacco. He reports that he does not currently use alcohol after a past usage of about 15.0 standard drinks of alcohol per week.  He reports that he does not currently use drugs after having Result Value Ref Range    WBC 7.0 4.5 - 11.5 k/uL    RBC 5.80 3.80 - 5.80 m/uL    Hemoglobin 17.2 (H) 12.5 - 16.5 g/dL    Hematocrit 51.2 37.0 - 54.0 %    MCV 88.3 80.0 - 99.9 fL    MCH 29.7 26.0 - 35.0 pg    MCHC 33.6 32.0 - 34.5 g/dL    RDW 12.5 11.5 - 15.0 %    Platelets 896 036 - 684 k/uL    MPV 8.9 7.0 - 12.0 fL    Neutrophils % 51 43.0 - 80.0 %    Lymphocytes % 34 20.0 - 42.0 %    Monocytes % 7 2.0 - 12.0 %    Eosinophils % 8 (H) 0 - 6 %    Basophils % 1 0.0 - 2.0 %    Immature Granulocytes 0 0.0 - 5.0 %    Neutrophils Absolute 3.59 1.80 - 7.30 k/uL    Lymphocytes Absolute 2.36 1.50 - 4.00 k/uL    Monocytes Absolute 0.47 0.10 - 0.95 k/uL    Eosinophils Absolute 0.55 (H) 0.05 - 0.50 k/uL    Basophils Absolute 0.04 0.00 - 0.20 k/uL    Absolute Immature Granulocyte 0.03 0.00 - 0.58 k/uL   Comprehensive Metabolic Panel   Result Value Ref Range    Sodium 140 132 - 146 mmol/L    Potassium 4.2 3.5 - 5.0 mmol/L    Chloride 103 98 - 107 mmol/L    CO2 23 22 - 29 mmol/L    Anion Gap 14 7 - 16 mmol/L    Glucose 87 74 - 99 mg/dL    BUN 12 6 - 20 mg/dL    Creatinine 0.9 0.70 - 1.20 mg/dL    Est, Glom Filt Rate >60 >60 mL/min/1.73m2    Calcium 9.7 8.6 - 10.2 mg/dL    Total Protein 7.1 6.4 - 8.3 g/dL    Albumin 4.7 3.5 - 5.2 g/dL    Total Bilirubin 0.3 0.0 - 1.2 mg/dL    Alkaline Phosphatase 85 40 - 129 U/L    ALT 17 0 - 40 U/L    AST 21 0 - 39 U/L   Serum Drug Screen   Result Value Ref Range    Acetaminophen Level <5 (L) 10.0 - 30.0 ug/mL    Ethanol <65 <65 mg/dL    Salicylate Lvl <1.4 0.0 - 30.0 mg/dL    Toxic Tricyclic Sc,Blood NEGATIVE NEGATIVE   Valproic Acid Level, Total   Result Value Ref Range    Valproic Acid Lvl <3 (L) 50 - 100 ug/mL    Valproic Dose amount Unknown     Valproic Date last dose UNK^Unknown^L     Valproic Time last dose UNK^Unknown^L      Imaging: All Radiology results interpreted by Radiologist unless otherwise noted.   No orders to display     EKG #1:  Interpreted by emergency department attending

## 2023-11-17 NOTE — ED NOTES
Per Selene Manual the pt was accepted to the [facility].      Teri Hernandez, [facility]  11/17/23 9609

## 2023-11-18 PROBLEM — F79 INTELLECTUAL DISABILITY: Status: ACTIVE | Noted: 2023-11-18

## 2023-11-18 PROBLEM — F31.9 BIPOLAR 1 DISORDER (HCC): Status: RESOLVED | Noted: 2023-11-18 | Resolved: 2023-11-18

## 2023-11-18 PROBLEM — F31.9 BIPOLAR 1 DISORDER (HCC): Status: ACTIVE | Noted: 2023-11-18

## 2023-11-18 LAB
DATE LAST DOSE: ABNORMAL
TME LAST DOSE: ABNORMAL H
VALPROATE SERPL-MCNC: <3 UG/ML (ref 50–100)
VANCOMYCIN DOSE: ABNORMAL MG

## 2023-11-18 PROCEDURE — 1240000000 HC EMOTIONAL WELLNESS R&B

## 2023-11-18 PROCEDURE — 6370000000 HC RX 637 (ALT 250 FOR IP): Performed by: PSYCHIATRY & NEUROLOGY

## 2023-11-18 PROCEDURE — 90792 PSYCH DIAG EVAL W/MED SRVCS: CPT | Performed by: NURSE PRACTITIONER

## 2023-11-18 PROCEDURE — 6370000000 HC RX 637 (ALT 250 FOR IP): Performed by: NURSE PRACTITIONER

## 2023-11-18 PROCEDURE — 6360000002 HC RX W HCPCS

## 2023-11-18 PROCEDURE — 6360000002 HC RX W HCPCS: Performed by: PSYCHIATRY & NEUROLOGY

## 2023-11-18 RX ORDER — HALOPERIDOL 5 MG/1
5 TABLET ORAL EVERY 6 HOURS PRN
Status: DISCONTINUED | OUTPATIENT
Start: 2023-11-18 | End: 2023-11-22 | Stop reason: HOSPADM

## 2023-11-18 RX ORDER — HYDROXYZINE PAMOATE 50 MG/1
50 CAPSULE ORAL 3 TIMES DAILY PRN
Status: DISCONTINUED | OUTPATIENT
Start: 2023-11-18 | End: 2023-11-22 | Stop reason: HOSPADM

## 2023-11-18 RX ORDER — DIPHENHYDRAMINE HYDROCHLORIDE 50 MG/ML
INJECTION INTRAMUSCULAR; INTRAVENOUS
Status: COMPLETED
Start: 2023-11-18 | End: 2023-11-18

## 2023-11-18 RX ORDER — LANOLIN ALCOHOL/MO/W.PET/CERES
3 CREAM (GRAM) TOPICAL NIGHTLY PRN
Status: DISCONTINUED | OUTPATIENT
Start: 2023-11-18 | End: 2023-11-22 | Stop reason: HOSPADM

## 2023-11-18 RX ORDER — HALOPERIDOL 5 MG/ML
5 INJECTION INTRAMUSCULAR EVERY 6 HOURS PRN
Status: DISCONTINUED | OUTPATIENT
Start: 2023-11-18 | End: 2023-11-22 | Stop reason: HOSPADM

## 2023-11-18 RX ORDER — DIVALPROEX SODIUM 500 MG/1
500 TABLET, DELAYED RELEASE ORAL 2 TIMES DAILY
Status: DISCONTINUED | OUTPATIENT
Start: 2023-11-18 | End: 2023-11-20

## 2023-11-18 RX ORDER — DIVALPROEX SODIUM 250 MG/1
250 TABLET, DELAYED RELEASE ORAL EVERY 12 HOURS SCHEDULED
Status: DISCONTINUED | OUTPATIENT
Start: 2023-11-18 | End: 2023-11-18

## 2023-11-18 RX ORDER — ACETAMINOPHEN 325 MG/1
650 TABLET ORAL EVERY 6 HOURS PRN
Status: DISCONTINUED | OUTPATIENT
Start: 2023-11-18 | End: 2023-11-22 | Stop reason: HOSPADM

## 2023-11-18 RX ORDER — DIPHENHYDRAMINE HYDROCHLORIDE 50 MG/ML
50 INJECTION INTRAMUSCULAR; INTRAVENOUS EVERY 6 HOURS PRN
Status: DISCONTINUED | OUTPATIENT
Start: 2023-11-18 | End: 2023-11-22 | Stop reason: HOSPADM

## 2023-11-18 RX ORDER — MAGNESIUM HYDROXIDE/ALUMINUM HYDROXICE/SIMETHICONE 120; 1200; 1200 MG/30ML; MG/30ML; MG/30ML
30 SUSPENSION ORAL PRN
Status: DISCONTINUED | OUTPATIENT
Start: 2023-11-18 | End: 2023-11-22 | Stop reason: HOSPADM

## 2023-11-18 RX ORDER — DIVALPROEX SODIUM 250 MG/1
250 TABLET, DELAYED RELEASE ORAL ONCE
Status: COMPLETED | OUTPATIENT
Start: 2023-11-18 | End: 2023-11-18

## 2023-11-18 RX ORDER — NICOTINE 21 MG/24HR
1 PATCH, TRANSDERMAL 24 HOURS TRANSDERMAL DAILY
Status: DISCONTINUED | OUTPATIENT
Start: 2023-11-18 | End: 2023-11-19

## 2023-11-18 RX ADMIN — DIVALPROEX SODIUM 250 MG: 250 TABLET, DELAYED RELEASE ORAL at 10:20

## 2023-11-18 RX ADMIN — DIVALPROEX SODIUM 250 MG: 250 TABLET, DELAYED RELEASE ORAL at 13:22

## 2023-11-18 RX ADMIN — HYDROXYZINE PAMOATE 50 MG: 50 CAPSULE ORAL at 08:35

## 2023-11-18 RX ADMIN — HALOPERIDOL LACTATE 5 MG: 5 INJECTION, SOLUTION INTRAMUSCULAR at 13:13

## 2023-11-18 RX ADMIN — DIVALPROEX SODIUM 500 MG: 500 TABLET, DELAYED RELEASE ORAL at 20:55

## 2023-11-18 RX ADMIN — DIPHENHYDRAMINE HYDROCHLORIDE 50 MG: 50 INJECTION INTRAMUSCULAR; INTRAVENOUS at 13:17

## 2023-11-18 ASSESSMENT — SLEEP AND FATIGUE QUESTIONNAIRES
DO YOU HAVE DIFFICULTY SLEEPING: YES
SLEEP PATTERN: DISTURBED/INTERRUPTED SLEEP
DO YOU HAVE DIFFICULTY SLEEPING: YES
AVERAGE NUMBER OF SLEEP HOURS: 4
DO YOU USE A SLEEP AID: NO
SLEEP PATTERN: DISTURBED/INTERRUPTED SLEEP
DO YOU USE A SLEEP AID: NO

## 2023-11-18 ASSESSMENT — LIFESTYLE VARIABLES
HOW MANY STANDARD DRINKS CONTAINING ALCOHOL DO YOU HAVE ON A TYPICAL DAY: PATIENT DOES NOT DRINK
HOW MANY STANDARD DRINKS CONTAINING ALCOHOL DO YOU HAVE ON A TYPICAL DAY: PATIENT DOES NOT DRINK
HOW OFTEN DO YOU HAVE A DRINK CONTAINING ALCOHOL: NEVER
HOW OFTEN DO YOU HAVE A DRINK CONTAINING ALCOHOL: NEVER

## 2023-11-18 ASSESSMENT — PATIENT HEALTH QUESTIONNAIRE - PHQ9
SUM OF ALL RESPONSES TO PHQ QUESTIONS 1-9: 0
SUM OF ALL RESPONSES TO PHQ9 QUESTIONS 1 & 2: 0
2. FEELING DOWN, DEPRESSED OR HOPELESS: 0
SUM OF ALL RESPONSES TO PHQ QUESTIONS 1-9: 0
1. LITTLE INTEREST OR PLEASURE IN DOING THINGS: 0
2. FEELING DOWN, DEPRESSED OR HOPELESS: 0
1. LITTLE INTEREST OR PLEASURE IN DOING THINGS: 0
SUM OF ALL RESPONSES TO PHQ QUESTIONS 1-9: 0
SUM OF ALL RESPONSES TO PHQ9 QUESTIONS 1 & 2: 0
SUM OF ALL RESPONSES TO PHQ QUESTIONS 1-9: 0
SUM OF ALL RESPONSES TO PHQ QUESTIONS 1-9: 0

## 2023-11-18 ASSESSMENT — PAIN SCALES - GENERAL: PAINLEVEL_OUTOF10: 0

## 2023-11-18 NOTE — ED PROVIDER NOTES
HPI:  11/17/23, Time: 9:21 PM ANTHONY Meeks is a 19 y.o. male history of ADHD history of acid reflux microcephaly history of schizophrenia presenting to the ED for psychiatric evaluation, beginning years ago. The complaint has been persistent, moderate in severity, and worsened by nothing. Patient reporting having suicidal thoughts and having thoughts of wanting to harm himself by shooting himself. Patient reports he is attempted to cut himself in the past.  Patient reporting having suicidal as well as homicidal thoughts. He reports no hallucinations he reports has not been taking his medications as prescribed he was seen here earlier today and reportedly was sent to the crisis unit. Patient left crisis unit because it was not helping. Patient reporting no physical plaints of chest pain or difficulty breathing there is no abdominal pain and there is no vomiting there is no diarrhea there is no headache he reports no cough. ROS:   Pertinent positives and negatives are stated within HPI, all other systems reviewed and are negative.  --------------------------------------------- PAST HISTORY ---------------------------------------------  Past Medical History:  has a past medical history of ADHD (attention deficit hyperactivity disorder), Autism disorder, GERD (gastroesophageal reflux disease), Hypotonia, Intermittent explosive disorder, Microcephalic ([address]), and Schizoaffective disorder ([address] Past Surgical History:  has no past surgical history on file. Social History:  reports that he has been smoking cigarettes. He has a 6.00 pack-year smoking history. He has never used smokeless tobacco. He reports that he does not currently use alcohol after a past usage of about 15.0 standard drinks of alcohol per week. He reports that he does not currently use drugs after having used the following drugs: Marijuana [place] [place]). Frequency: 7.00 times per week.     Family History: family history is not on

## 2023-11-18 NOTE — H&P
Department of Psychiatry  History and Physical - Adult     CHIEF COMPLAINT: \"I want to go back to the crisis unit. \"    Patient was seen after discussing with the treatment team and reviewing the chart    CIRCUMSTANCES OF ADMISSION: presented to the ED after patient was pink slipped by the ED doctor after patient called 911 from the crisis unit reporting suicidal ideations believing the crisis unit is not helping him. Patient was just recently discharged to the crisis unit earlier that day prior to representing. HISTORY OF PRESENT ILLNESS:      The patient is a 19 y.o. male with significant past history of bipolar disorder and autism spectrum disorder presented to the ED after patient was pink slipped by the ED doctor after patient called 911 from the crisis unit reporting suicidal ideations believing the crisis unit is not helping him. Patient was just recently discharged to the crisis unit earlier that day prior to representing. In the ED his urine drug screen was positive for cannabis his blood alcohol was negative he was medically cleared admitted to [address] [place] [address] psychiatric unit for further psychiatric assessment stabilization and treatment    Upon evaluation today patient loud and agitated with very poor insight and judgment. He is intrusive and continues to interrupt us on the unit asking to be discharged. He states that now he wants to go to the crisis unit and states that now they are helping him. He is also fixated on getting his clothing. He was told multiple times that unit policies are up to the staff to enforce and were not able to override the unit policies however he is fixated on getting close he is loud and agitated. He is difficult to redirect and has very poor insight and judgment. He told nursing staff that he did not like the crisis unit report it was \"filthy and it smells like pee. \"  Patient just recently discharged here from [facility] on October 30 at which time patient was

## 2023-11-18 NOTE — ED NOTES
Fax to nursing office for [facility] in staffing notified     Hipolito Goetz, [address]  11/18/23 0864

## 2023-11-18 NOTE — ED NOTES
Spoke with charge RN on 7S and patient will be assigned to DANNA Rucker from admitting notified.      Newton Wise RN  11/18/23 [id number]

## 2023-11-18 NOTE — ED NOTES
Patient accepted by Gina Perez NP /  Dr. Cr Rivero for behavioral health admission.       Delena Cowden, RN  11/18/23 0857

## 2023-11-18 NOTE — CARE COORDINATION
Biopsychosocial Assessment Note    Social work met with patient to complete the biopsychosocial assessment and C-SSRS. Chief Complaint: Per pt report, \"I told them I was suicidal but I'm not\"    Mental Status Exam: Pt appeared to be alert and oriented x 4. Pt was elevated throughout this 's assessment. Pt's eye-contact is intense, speech is rapid and pressured. Pt's affect was exaggerated. Clinical Summary: Pt states that his last admission to this psychiatric facility was 10/26/23. Pt states that he called 911 from the crisis unit and said he was suicidal, but he denies that he was actually suicidal. Pt states that he wants to be discharged back to the crisis unit today, and is hyper focused on this. Pt is currently denying SI/ HI/ hallucinations/ delusions. Pt denied suicide attempt history. Pt denied self-injurious behaviors. Pt denied trauma history. Pt admits to substance use in the form of marijuana. Pt states that he plans to go back to the crisis unit at discharge. Pt states that he plans to continue to treat outpatient with [facility] at discharge. Risk Factors: minimizing, substance use, possible homelessness, and poor insight and poor judgement. Protective Factors: girlfriend support and previous positive response to treatment.     Gender  [x] Male [] Female [] Transgender  [] Other    Sexual Orientation    [x] Heterosexual [] Homosexual [] Bisexual [] Other    Suicidal Ideation  [] Past [] Present [x] Denies     C-SSRS Screening Completed: Current Suicide Risk:  [] No Risk  [x] Low [] Moderate [] High    Homicidal Ideation  [] Past [] Present [x] Denies     Hallucinations/Delusions (Specify type)  [] Reports [x] Denies     Current or Past Mental Health Treatment:  [x] Yes, When and Where: [facility]- current  [] No    Substance Use/Alcohol Use/Addiction  [x] Reports [] Denies     Tobacco Use (within the last 6 months)  [x] Reports [] Denies     Trauma History  []

## 2023-11-19 PROCEDURE — 1240000000 HC EMOTIONAL WELLNESS R&B

## 2023-11-19 PROCEDURE — 6370000000 HC RX 637 (ALT 250 FOR IP): Performed by: NURSE PRACTITIONER

## 2023-11-19 PROCEDURE — 6360000002 HC RX W HCPCS: Performed by: NURSE PRACTITIONER

## 2023-11-19 PROCEDURE — 6360000002 HC RX W HCPCS: Performed by: PSYCHIATRY & NEUROLOGY

## 2023-11-19 PROCEDURE — 99232 SBSQ HOSP IP/OBS MODERATE 35: CPT | Performed by: NURSE PRACTITIONER

## 2023-11-19 RX ORDER — POLYETHYLENE GLYCOL 3350 17 G
2 POWDER IN PACKET (EA) ORAL
Status: DISCONTINUED | OUTPATIENT
Start: 2023-11-19 | End: 2023-11-22 | Stop reason: HOSPADM

## 2023-11-19 RX ORDER — OLANZAPINE 5 MG/1
5 TABLET, ORALLY DISINTEGRATING ORAL 2 TIMES DAILY
Status: DISCONTINUED | OUTPATIENT
Start: 2023-11-19 | End: 2023-11-22 | Stop reason: HOSPADM

## 2023-11-19 RX ADMIN — DIVALPROEX SODIUM 500 MG: 500 TABLET, DELAYED RELEASE ORAL at 08:59

## 2023-11-19 RX ADMIN — HALOPERIDOL LACTATE 5 MG: 5 INJECTION, SOLUTION INTRAMUSCULAR at 09:00

## 2023-11-19 RX ADMIN — OLANZAPINE 5 MG: 5 TABLET, ORALLY DISINTEGRATING ORAL at 15:54

## 2023-11-19 RX ADMIN — OLANZAPINE 5 MG: 5 TABLET, ORALLY DISINTEGRATING ORAL at 21:39

## 2023-11-19 RX ADMIN — DIPHENHYDRAMINE HYDROCHLORIDE 50 MG: 50 INJECTION, SOLUTION INTRAMUSCULAR; INTRAVENOUS at 09:00

## 2023-11-19 RX ADMIN — DIVALPROEX SODIUM 500 MG: 500 TABLET, DELAYED RELEASE ORAL at 21:39

## 2023-11-19 ASSESSMENT — PAIN SCALES - GENERAL: PAINLEVEL_OUTOF10: 0

## 2023-11-19 NOTE — PSYCHOTHERAPY
Patient medicated with Stat PRN medications for his safety for extreme agitation, punching walls in his room, screaming, not redirecting with verbal redirection by 3 staff and NP here on unit. Patient upset, wants to leave, wants his shoes, staff handing out breakfast trays witnessed patient home medication one Ibuprofen capsule rolling across chair he was sitting on. Patient admitted he brought the pill in, \"I need it for my pain\". Policy on outside medication not allowed on unit reviewed with patient. Patient then denied he brought the Ibuprofen states \"I didn't do it, its yesterday shift fault\". Patient slightly calmer after PRN medication given. Patient denies thoughts to harm self or others, denies hallucinations. Patient compliant with scheduled medications. Patient speech is rapid and pressured with flight of ideas, using vulgar language and threatening staff. Patient states \"I don't give a fuck , I will go to [facility] today! \". Patient ate breakfast and showered. Emotional support given.

## 2023-11-19 NOTE — BH NOTE
Two calls received from female claiming to be patient's Cousin, initially she did not have a PIN number associated with patient, then she called back stating her Grandmother told her his pin was a number we did not have on record for him (As of this writing, patient does not have a pin number associated with this admission). This woman was made aware that we are unable to confirm or deny that patient was at facility. Woman sounded agitated as she relayed that patient Garrett Talamantes autism\" then continued to state that \"you guys discharge him in the middle of nowhere and he needs help, we need called before you guys discharge him so we can help him\"  Patient was approached and made aware, patient signed NINA for his cousin Henrietta Clements (cousin) [phone], assigned RN made aware.

## 2023-11-20 LAB
EKG ATRIAL RATE: 68 BPM
EKG ATRIAL RATE: 80 BPM
EKG P AXIS: 48 DEGREES
EKG P AXIS: 53 DEGREES
EKG P-R INTERVAL: 136 MS
EKG P-R INTERVAL: 136 MS
EKG Q-T INTERVAL: 344 MS
EKG Q-T INTERVAL: 356 MS
EKG QRS DURATION: 78 MS
EKG QRS DURATION: 84 MS
EKG QTC CALCULATION (BAZETT): 378 MS
EKG QTC CALCULATION (BAZETT): 396 MS
EKG R AXIS: 54 DEGREES
EKG R AXIS: 60 DEGREES
EKG T AXIS: 57 DEGREES
EKG T AXIS: 61 DEGREES
EKG VENTRICULAR RATE: 68 BPM
EKG VENTRICULAR RATE: 80 BPM

## 2023-11-20 PROCEDURE — 6370000000 HC RX 637 (ALT 250 FOR IP): Performed by: NURSE PRACTITIONER

## 2023-11-20 PROCEDURE — 93010 ELECTROCARDIOGRAM REPORT: CPT | Performed by: INTERNAL MEDICINE

## 2023-11-20 PROCEDURE — 6370000000 HC RX 637 (ALT 250 FOR IP): Performed by: PSYCHIATRY & NEUROLOGY

## 2023-11-20 PROCEDURE — 1240000000 HC EMOTIONAL WELLNESS R&B

## 2023-11-20 PROCEDURE — 99232 SBSQ HOSP IP/OBS MODERATE 35: CPT | Performed by: NURSE PRACTITIONER

## 2023-11-20 RX ADMIN — DIVALPROEX SODIUM 750 MG: 500 TABLET, DELAYED RELEASE ORAL at 20:51

## 2023-11-20 RX ADMIN — NICOTINE POLACRILEX 2 MG: 2 LOZENGE ORAL at 18:59

## 2023-11-20 RX ADMIN — NICOTINE POLACRILEX 2 MG: 2 LOZENGE ORAL at 16:34

## 2023-11-20 RX ADMIN — NICOTINE POLACRILEX 2 MG: 2 LOZENGE ORAL at 21:09

## 2023-11-20 RX ADMIN — NICOTINE POLACRILEX 2 MG: 2 LOZENGE ORAL at 09:09

## 2023-11-20 RX ADMIN — OLANZAPINE 5 MG: 5 TABLET, ORALLY DISINTEGRATING ORAL at 08:46

## 2023-11-20 RX ADMIN — HYDROXYZINE PAMOATE 50 MG: 50 CAPSULE ORAL at 20:52

## 2023-11-20 RX ADMIN — MELATONIN 3 MG ORAL TABLET 3 MG: 3 TABLET ORAL at 20:52

## 2023-11-20 RX ADMIN — DIVALPROEX SODIUM 500 MG: 500 TABLET, DELAYED RELEASE ORAL at 08:46

## 2023-11-20 RX ADMIN — OLANZAPINE 5 MG: 5 TABLET, ORALLY DISINTEGRATING ORAL at 20:52

## 2023-11-20 ASSESSMENT — PAIN SCALES - GENERAL: PAINLEVEL_OUTOF10: 0

## 2023-11-20 NOTE — GROUP NOTE
Group Therapy Note    Date: 11/20/2023    Group Start Time: 1120  Group End Time: 0025  Group Topic: Psychotherapy    [facility] [address], [place]        Group Therapy Note    Attendees: 7       Patient's Goal:  To increase socialization and improve interpersonal relationships. Notes:  Pt was an active participant in group discussion. Status After Intervention:  Unchanged    Participation Level: Active Listener and Interactive    Participation Quality: Appropriate and Attentive      Speech:  normal      Thought Process/Content: Linear      Affective Functioning: Blunted      Mood: anxious      Level of consciousness:  Alert, Oriented x4, and Attentive      Response to Learning: Able to verbalize current knowledge/experience, Able to verbalize/acknowledge new learning, and Able to retain information      Endings: None Reported    Modes of Intervention: Support, Socialization, and Exploration      Discipline Responsible: /Counselor      Signature:   CHELSEY Ward, [place]

## 2023-11-20 NOTE — DISCHARGE INSTRUCTIONS
No    Alcohol/Substance Abuse Discharge Plan:   Does the patient have a history of substance/alcohol abuse and requires a referral for treatment? No  Patient referred to the following for substance/alcohol abuse treatment with an appointment? No  Patient was offered medication to assist with alcohol cessation at discharge? No      Patient discharged to Home; discussed with patient/caregiver        This After Visit Summary was reviewed with the patient. [] After Visit summary was printed and given to the patient. [] After Visit Summary was forwarded to accepting facility.

## 2023-11-20 NOTE — GROUP NOTE
Group Therapy Note    Date: 11/20/2023    Group Start Time: 3512  Group End Time: 4235  Group Topic: Recreational    [facility] 1    JILL Merrill                                                                              Group Therapy Note      Type of Group: Recreational    Group name: [email or web address]  Patient's Goal:  Patient will be able to increase optimism and decrease hopelessness. Notes:  Pleasant and engaged in [place] with others. Status After Intervention:  Improved    Participation Level:  Active Listener and Interactive    Participation Quality: Appropriate, Attentive, and Sharing      Speech:  normal      Thought Process/Content: Logical      Affective Functioning: Congruent      Mood: euthymic      Level of consciousness:  Alert, Oriented x4, and Attentive      Response to Learning: Able to verbalize/acknowledge new learning      Endings: None Reported    Modes of Intervention: Support, Socialization, and Activity      Discipline Responsible: Psychoeducational Specialist      Signature:  Juan Carlos Ramirez

## 2023-11-20 NOTE — GROUP NOTE
Group Therapy Note    Date: 11/20/2023    Group Start Time: 4194  Group End Time: 5266  Group Topic: Psychoeducation    [facility]    Jorge Ponce                                                                        Group Therapy Note    Date: 11/20/2023  Module Name:  pillars of self confidence       Patient's Goal:  Pleasant and sharing in group, able to share appropriately. Notes:  Pleasant and willing to share what steps one can take to increase his/her own self worth. Status After Intervention:  Improved    Participation Level:  Active Listener and Interactive    Participation Quality: Appropriate, Attentive, and Sharing      Speech:  normal      Thought Process/Content: Logical      Affective Functioning: Congruent      Mood: euthymic      Level of consciousness:  Alert, Oriented x4, and Attentive      Response to Learning: Able to verbalize/acknowledge new learning, Able to retain information, and Progressing to goal      Endings: None Reported    Modes of Intervention: Education, Support, Socialization, and Problem-solving      Discipline Responsible: Psychoeducational Specialist      Signature:  Jorge Ponce

## 2023-11-20 NOTE — CARE COORDINATION
Pt approached SW and informed SW that he would like to stay with his grandmother at discharge. He states that he has spoken with her about this and she is agreeable. Pt agreed to sign NINA for his grandmother Trudy Sanchez . SW contacted pt grandmother BEATRICE  (NINA signed) to gain collateral/discuss pt discharge plan. She states that his \"bipolar is off the charts\" and that pt did best when he was prescribed 750mg Depakote in morning and 750mg Depakote in evening, along with a Risperdal shot every 2 weeks. She states that pt is not ready to discharge today, but he is discharge focused. She states that pt is able to stay with her at discharge. She states that he will need to have goals and be working, taking his medications if he stays with her. She states that pt never goes to [facility] when he discharges and will throw away his medications once he is discharged. She states that he will typically only stay with her for about 3 days after discharge until he leaves and goes somewhere else. She denied pt access to guns/weapons. She wants to make sure he is doing better before he comes home.

## 2023-11-21 LAB
CHOLEST SERPL-MCNC: 123 MG/DL
HBA1C MFR BLD: 5.1 % (ref 4–5.6)
HDLC SERPL-MCNC: 44 MG/DL
LDLC SERPL CALC-MCNC: 60 MG/DL
TRIGL SERPL-MCNC: 96 MG/DL
VALPROATE SERPL-MCNC: 49 UG/ML (ref 50–100)
VLDLC SERPL CALC-MCNC: 19 MG/DL

## 2023-11-21 PROCEDURE — 99232 SBSQ HOSP IP/OBS MODERATE 35: CPT | Performed by: NURSE PRACTITIONER

## 2023-11-21 PROCEDURE — 1240000000 HC EMOTIONAL WELLNESS R&B

## 2023-11-21 PROCEDURE — 6370000000 HC RX 637 (ALT 250 FOR IP): Performed by: NURSE PRACTITIONER

## 2023-11-21 PROCEDURE — 36415 COLL VENOUS BLD VENIPUNCTURE: CPT

## 2023-11-21 PROCEDURE — 83036 HEMOGLOBIN GLYCOSYLATED A1C: CPT

## 2023-11-21 PROCEDURE — 6370000000 HC RX 637 (ALT 250 FOR IP): Performed by: PSYCHIATRY & NEUROLOGY

## 2023-11-21 PROCEDURE — 80061 LIPID PANEL: CPT

## 2023-11-21 PROCEDURE — 80164 ASSAY DIPROPYLACETIC ACD TOT: CPT

## 2023-11-21 RX ORDER — POLYETHYLENE GLYCOL 3350 17 G
2 POWDER IN PACKET (EA) ORAL
Qty: 100 EACH | Refills: 3
Start: 2023-11-21

## 2023-11-21 RX ORDER — DIVALPROEX SODIUM 250 MG/1
750 TABLET, DELAYED RELEASE ORAL 2 TIMES DAILY
Qty: 180 TABLET | Refills: 0 | Status: SHIPPED | OUTPATIENT
Start: 2023-11-21 | End: 2023-12-21

## 2023-11-21 RX ORDER — OLANZAPINE 5 MG/1
5 TABLET ORAL NIGHTLY
Qty: 30 TABLET | Refills: 0 | Status: SHIPPED | OUTPATIENT
Start: 2023-11-21 | End: 2023-12-21

## 2023-11-21 RX ADMIN — MELATONIN 3 MG ORAL TABLET 3 MG: 3 TABLET ORAL at 20:05

## 2023-11-21 RX ADMIN — OLANZAPINE 5 MG: 5 TABLET, ORALLY DISINTEGRATING ORAL at 20:05

## 2023-11-21 RX ADMIN — HYDROXYZINE PAMOATE 50 MG: 50 CAPSULE ORAL at 20:05

## 2023-11-21 RX ADMIN — NICOTINE POLACRILEX 2 MG: 2 LOZENGE ORAL at 14:09

## 2023-11-21 RX ADMIN — DIVALPROEX SODIUM 750 MG: 500 TABLET, DELAYED RELEASE ORAL at 08:53

## 2023-11-21 RX ADMIN — OLANZAPINE 5 MG: 5 TABLET, ORALLY DISINTEGRATING ORAL at 08:53

## 2023-11-21 RX ADMIN — DIVALPROEX SODIUM 750 MG: 500 TABLET, DELAYED RELEASE ORAL at 20:05

## 2023-11-21 RX ADMIN — NICOTINE POLACRILEX 2 MG: 2 LOZENGE ORAL at 17:50

## 2023-11-21 RX ADMIN — NICOTINE POLACRILEX 2 MG: 2 LOZENGE ORAL at 15:28

## 2023-11-21 RX ADMIN — NICOTINE POLACRILEX 2 MG: 2 LOZENGE ORAL at 08:54

## 2023-11-21 RX ADMIN — NICOTINE POLACRILEX 2 MG: 2 LOZENGE ORAL at 20:50

## 2023-11-21 NOTE — GROUP NOTE
Group Therapy Note    Date: 11/21/2023  Start Time: 1600  Number of Participants: 10    Type of Group: Recreational    Wellness Binder Information  Module Name:  Cinema therapy    Patient's Goal:  To increase optimism and decrease hopelessness in oneself. To explore causes of their illness. Notes:  CTRS discussed the use of cinema therapy and played the movie Elf. Status After Intervention:  Improved    Participation Level:  Active Listener     Participation Quality: Appropriate and Attentive      Speech:  normal      Thought Process/Content: Logical      Affective Functioning: Congruent      Mood: euthymic      Level of consciousness:  Alert and Attentive      Response to Learning: Able to verbalize current knowledge/experience, Able to verbalize/acknowledge new learning, and Able to retain information      Endings: None Reported    Modes of Intervention: Support, Exploration, and Activity      Discipline Responsible: Psychoeducational Specialist      Signature:  JILL Gan     Group Therapy Note    Attendees: 10

## 2023-11-21 NOTE — CARE COORDINATION
Pt was seen during treatment team. Treatment team clearly discussed pt tentative discharge plan with pt. Pt is hopeful to discharge soon and states that he plans to return home with his grandmother. Pt denied SI/HI/AVH. JUAN contacted [facility]  to schedule appointments for pt. Pt has telehealth crisis support with [facility] 11/30 at 2:30pm and 12/4 at 2pm in [facility] office for psych recheck with [facility]. They are aware of pt long acting injection.

## 2023-11-21 NOTE — GROUP NOTE
Group Therapy Note    Date: 11/21/2023    Group Start Time: 1010  Group End Time: 0283  Group Topic: Psychoeducation    [facility]    Tushar Alvarez                                                                        Group Therapy Note    Date: 11/21/2023  Wellness Binder Information  Module Name:  tips for overcoming holiday stress   patient's Goal:  patient will be able to identify barriers to managing holiday stress. Notes:  Pleasant and able to share like experiences from the past.     Status After Intervention:  Improved    Participation Level:  Active Listener and Interactive    Participation Quality: Appropriate, Attentive, and Sharing      Speech:  normal      Thought Process/Content: Logical      Affective Functioning: Congruent      Mood: euthymic      Level of consciousness:  Alert, Oriented x4, and Attentive      Response to Learning: Able to verbalize/acknowledge new learning, Able to retain information, and Progressing to goal      Endings: None Reported    Modes of Intervention: Education, Support, Socialization, and Exploration      Discipline Responsible: Psychoeducational Specialist      Signature:  Tushar Alvarez

## 2023-11-22 VITALS
TEMPERATURE: 97.6 F | WEIGHT: 140 LBS | DIASTOLIC BLOOD PRESSURE: 51 MMHG | BODY MASS INDEX: 20.73 KG/M2 | HEIGHT: 69 IN | SYSTOLIC BLOOD PRESSURE: 89 MMHG | HEART RATE: 63 BPM | RESPIRATION RATE: 15 BRPM | OXYGEN SATURATION: 98 %

## 2023-11-22 PROCEDURE — 6370000000 HC RX 637 (ALT 250 FOR IP): Performed by: PSYCHIATRY & NEUROLOGY

## 2023-11-22 PROCEDURE — 99239 HOSP IP/OBS DSCHRG MGMT >30: CPT | Performed by: NURSE PRACTITIONER

## 2023-11-22 PROCEDURE — 6370000000 HC RX 637 (ALT 250 FOR IP): Performed by: NURSE PRACTITIONER

## 2023-11-22 RX ADMIN — OLANZAPINE 5 MG: 5 TABLET, ORALLY DISINTEGRATING ORAL at 08:40

## 2023-11-22 RX ADMIN — NICOTINE POLACRILEX 2 MG: 2 LOZENGE ORAL at 08:40

## 2023-11-22 RX ADMIN — DIVALPROEX SODIUM 750 MG: 500 TABLET, DELAYED RELEASE ORAL at 08:39

## 2023-11-22 NOTE — CARE COORDINATION
JUAN met with pt to discuss discharge. Pt states that he is feeling much better, denied SI/HI/AVH. He states that he plans to return home with his grandmother at discharge, his cousin or her to transport. Pt denied SI/HI/AVH. He is aware of his follow up with [facility] and plans to attend. JUAN provided hospital excuse for pt. JUAN contacted pt grandmother  [phone] (NINA signed) to discuss pt discharge for today. She states that she has no concerns for pt discharge and is glad pt is on his medications. She states that her or pt cousin are able to transport today.      In order to ensure appropriate transition and discharge planning is in place, the following documents have been transmitted to Nate Garland, as the new outpatient provider:    The d/c diagnosis was transmitted to the next care provider  The reason for hospitalization was transmitted to the next care provider  The d/c medications (dosage and indication) were transmitted to the next care provider   The continuing care plan was transmitted to the next care provider
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
## 2023-11-22 NOTE — PLAN OF CARE
[facility]  Day 3 Interdisciplinary Treatment Plan NOTE    Review Date & Time:  11/20/23 1400    Patient was in treatment team    Estimated Length of Stay Update:   5 DAYS  Estimated Discharge Date Update:  WED.     EDUCATION:   Learner Progress Toward Treatment Goals: Reviewed results and recommendations of this team    Method: Small group    Outcome: Needs reinforcement    PATIENT GOALS: \"DON'T ACT STUPID\"    PLAN/TREATMENT RECOMMENDATIONS UPDATE: MEDICATIONS, GROUPS AS TOLERATED, ASSESS OFR RADHA, SUPPORTIVE AND DIRECTIVE CARE, COLLATERAL INFORMATION, DISCHARGE PLANNING AND FOLLOW UP    GOALS UPDATE:   Time frame for Short-Term Goals:  5 DAYS      Ranjith Fuller RN
[facility]  Discharge Note    Pt discharged with followings belongings:   Dental Appliances: None  Vision - Corrective Lenses: None  Hearing Aid: None  Jewelry: None  Body Piercings Removed: N/A  Clothing: Pants, Footwear, Shirt, Sweater, Socks  Other Valuables: Other (Comment) (vape)   Valuables sent home with patient or returned to patient. Patient educated on aftercare instructions: yes     Patient verbalize understanding of AVS:  yes. Status EXAM upon discharge:  Mental Status and Behavioral Exam  Normal: No  Level of Assistance: Independent/Self  Facial Expression: Brightened  Affect: Congruent  Level of Consciousness: Alert  Frequency of Checks: 4 times per hour, close  Mood:Normal: No  Mood: Anxious  Motor Activity:Normal: Yes  Motor Activity: Increased, Repetitive acts  Eye Contact: Good  Observed Behavior: Friendly, Preoccupied  Sexual Misconduct History: Current - no  Preception: [place] to person, [place] to time, [place] to place, [place] to situation  Attention:Normal: No  Attention: Distractible  Thought Processes: Circumstantial  Thought Content:Normal: No  Thought Content: Preoccupations  Depression Symptoms: No problems reported or observed. Anxiety Symptoms: Other (comment) (anxious over discharge)  Shilpa Symptoms: No problems reported or observed. Hallucinations: None  Delusions: No  Delusions:  Other (comment) (non voiced at this time)  Memory:Normal: Yes  Memory: Confabulation  Insight and Judgment: No  Insight and Judgment: Poor judgment    Tobacco Screening:  Practical Counseling, on admission, silvana X, if applicable and completed (first 3 are required if patient doesn't refuse):            ( ) Recognizing danger situations (included triggers and roadblocks)                    ( ) Coping skills (new ways to manage stress,relaxation techniques, changing routine, distraction)                                                           ( ) Basic information about quitting (benefits
Patient is alert and oriented x 4. Denies pain. No noted signs or symptoms of distress. Denies SI/HI, A/V/H, or thoughts of self harm when asked. Rates Anxiety at 0/10 and Depression at 0/10. Attended on unit groups this shift. Medication compliant. Anxious, polite, and cooperative. Bright Affect. Appropriate with peers and staff. Appears well groomed/neat, room Is clean. Up for all meals. Will continue to monitor for safety q 15 minute safety rounds and environmental assessments.
Problem: Involuntary Admit  Goal: Will cooperate with staff recommendations and doctor's orders and will demonstrate appropriate behavior  Description: INTERVENTIONS:  1. Treat underlying conditions and offer medication as ordered  2. Educate regarding involuntary admission procedures and rules  3. Contain excessive/inappropriate behavior per unit and hospital policies  84/21/2639 [id number] by Ramin Arce RN  Outcome: Not Progressing  11/20/2023 0530 by Jane Friend RN  Outcome: Progressing     Problem: Risk for Elopement  Goal: Patient will not exit the unit/facility without proper excort  11/20/2023 1446 by Ramin Arce RN  Outcome: Progressing  11/20/2023 0530 by Jane Friend RN  Outcome: Progressing     Problem: Depression/Self Harm  Goal: Effect of psychiatric condition will be minimized and patient will be protected from self harm  Description: INTERVENTIONS:  1. Assess impact of patient's symptoms on level of functioning, self care needs and offer support as indicated  2. Assess patient/family knowledge of depression, impact on illness and need for teaching  3. Provide emotional support, presence and reassurance  4. Assess for possible suicidal thoughts or ideation. If patient expresses suicidal thoughts or statements do not leave alone, initiate Suicide Precautions, move to a room close to the nursing station and obtain sitter  5. Initiate consults as appropriate with Mental Health Professional, Spiritual Care, Psychosocial CNS, and consider a recommendation to the LIP for a Psychiatric Consultation  11/20/2023 1446 by Ramin Arce RN  Outcome: Progressing  11/20/2023 0530 by Jane Friend RN  Outcome: Progressing     Problem: Drug Abuse/Detox  Goal: Will have no detox symptoms and will verbalize plan for changing drug-related behavior  Description: INTERVENTIONS:  1. Administer medication as ordered  2. Monitor physical status  3. Provide emotional support with 1:1 interaction with staff  4.
Problem: Risk for Elopement  Goal: Patient will not exit the unit/facility without proper excort  Outcome: Progressing     Problem: Depression/Self Harm  Goal: Effect of psychiatric condition will be minimized and patient will be protected from self harm  Description: INTERVENTIONS:  1. Assess impact of patient's symptoms on level of functioning, self care needs and offer support as indicated  2. Assess patient/family knowledge of depression, impact on illness and need for teaching  3. Provide emotional support, presence and reassurance  4. Assess for possible suicidal thoughts or ideation. If patient expresses suicidal thoughts or statements do not leave alone, initiate Suicide Precautions, move to a room close to the nursing station and obtain sitter  5.  Initiate consults as appropriate with Mental Health Professional, Spiritual Care, Psychosocial CNS, and consider a recommendation to the LIP for a Psychiatric Consultation  Outcome: Progressing  Flowsheets (Taken 11/21/2023 1418)  Effect of psychiatric condition will be minimized and patient will be protected from self harm: Provide emotional support, presence and reassurance     Problem: Nutrition Deficit:  Goal: Optimize nutritional status  Outcome: Progressing
Pt napped at onset of shift. Pt up in milieu, initiating interactions with staff and peers. Had HS snack. Denied suicidal/homicidal ideations and hallucinations. Maintained control of behavior. Support offered. Remains on close observation staggered q 15 min observations.
Pt resting in bed apparently asleep with easy even respirations at HS q 15 min electronic rounding.
Pt resting in room with eyes closed at start of shift. Pt denies SI, HI and AVH. Pt asking multiple staff for his clothing and shoes. Pt states that he is ok to have the shoe laces removed from his shoes and that \"I want to sleep in my clothes. \" Pt educated that his clothes would not be available at this time and hospital attire will be provided as needed for him. Pt then went to his room. Problem: Depression/Self Harm  Goal: Effect of psychiatric condition will be minimized and patient will be protected from self harm  Description: INTERVENTIONS:  1. Assess impact of patient's symptoms on level of functioning, self care needs and offer support as indicated  2. Assess patient/family knowledge of depression, impact on illness and need for teaching  3. Provide emotional support, presence and reassurance  4. Assess for possible suicidal thoughts or ideation. If patient expresses suicidal thoughts or statements do not leave alone, initiate Suicide Precautions, move to a room close to the nursing station and obtain sitter  5. Initiate consults as appropriate with Mental Health Professional, Spiritual Care, Psychosocial CNS, and consider a recommendation to the LIP for a Psychiatric Consultation  Outcome: Progressing     Problem: Drug Abuse/Detox  Goal: Will have no detox symptoms and will verbalize plan for changing drug-related behavior  Description: INTERVENTIONS:  1. Administer medication as ordered  2. Monitor physical status  3. Provide emotional support with 1:1 interaction with staff  4. Encourage  recovery focused treatment   Outcome: Progressing     Problem: Involuntary Admit  Goal: Will cooperate with staff recommendations and doctor's orders and will demonstrate appropriate behavior  Description: INTERVENTIONS:  1. Treat underlying conditions and offer medication as ordered  2. Educate regarding involuntary admission procedures and rules  3.  Contain excessive/inappropriate behavior per unit and hospital
RN  Outcome: Progressing  11/18/2023 2115 by Flako Saavedra RN  Outcome: Progressing

## 2023-11-29 ENCOUNTER — HOSPITAL ENCOUNTER (EMERGENCY)
Age: 18
Discharge: LEFT BEFORE DISCHARGE | End: 2023-11-29
Payer: COMMERCIAL

## 2023-11-29 VITALS
DIASTOLIC BLOOD PRESSURE: 76 MMHG | RESPIRATION RATE: 17 BRPM | TEMPERATURE: 97.8 F | HEART RATE: 111 BPM | SYSTOLIC BLOOD PRESSURE: 137 MMHG | BODY MASS INDEX: 20.66 KG/M2 | WEIGHT: 140 LBS | OXYGEN SATURATION: 98 %

## 2023-11-29 PROCEDURE — 99281 EMR DPT VST MAYX REQ PHY/QHP: CPT

## 2023-11-29 NOTE — ED NOTES
After patient was triaged, the patient said that his grandma is going to take him somewhere else to get help. Pt continues to deny SI or HI. Vitals are stable. AMA paper signed.       Drea Kirk RN  11/29/23 4549

## 2023-11-29 NOTE — ED NOTES
Department of Emergency Medicine  FIRST PROVIDER TRIAGE NOTE             Independent MLP           11/29/23  1:14 PM EST    Date of Encounter: 11/29/23   MRN: [id number]      HPI: Rob Sanz is a 19 y.o. male who presents to the ED for OTHER (Pt is here because he wants to go to the crisis unit. Pt denies any SI or HI. )   Patient reporting to the ER because he wants to go to the crisis unit. Upon triage, advising that his grandmother is coming to take him \"somewhere else. \" Reports increase in depression but adamantly denies suicidal or homicidal ideation. ROS: Negative for cp, sob, Suicidal ideation, or Homicidal Ideation. PE: Gen Appearance/Constitutional: alert  Musculoskeletal: moves all extremities x 4     Initial Plan of Care: All treatment areas with department are currently occupied. Plan to order/Initiate the following while awaiting opening in ED: labs.   Initiate Treatment-Testing, Proceed toTreatment Area When Bed Available for ED Attending/MLP to Continue Care    Electronically signed by LISA Styles   DD: 11/29/23       Smooth Dubois, [address] [place] [address] Road  11/29/23 6431

## 2023-12-02 ENCOUNTER — APPOINTMENT (OUTPATIENT)
Dept: GENERAL RADIOLOGY | Age: 18
End: 2023-12-02
Payer: COMMERCIAL

## 2023-12-02 ENCOUNTER — HOSPITAL ENCOUNTER (EMERGENCY)
Age: 18
Discharge: LEFT BEFORE DISCHARGE | End: 2023-12-02
Payer: COMMERCIAL

## 2023-12-02 VITALS
HEART RATE: 98 BPM | DIASTOLIC BLOOD PRESSURE: 80 MMHG | WEIGHT: 140 LBS | TEMPERATURE: 97.8 F | SYSTOLIC BLOOD PRESSURE: 124 MMHG | BODY MASS INDEX: 20.66 KG/M2 | RESPIRATION RATE: 17 BRPM | OXYGEN SATURATION: 99 %

## 2023-12-02 VITALS
RESPIRATION RATE: 16 BRPM | TEMPERATURE: 97.7 F | DIASTOLIC BLOOD PRESSURE: 87 MMHG | HEART RATE: 98 BPM | SYSTOLIC BLOOD PRESSURE: 129 MMHG | OXYGEN SATURATION: 99 %

## 2023-12-02 LAB
ALBUMIN SERPL-MCNC: 4.6 G/DL (ref 3.5–5.2)
ALP SERPL-CCNC: 81 U/L (ref 40–129)
ALT SERPL-CCNC: 44 U/L (ref 0–40)
AMPHET UR QL SCN: NEGATIVE
ANION GAP SERPL CALCULATED.3IONS-SCNC: 9 MMOL/L (ref 7–16)
APAP SERPL-MCNC: <5 UG/ML (ref 10–30)
AST SERPL-CCNC: 33 U/L (ref 0–39)
BARBITURATES UR QL SCN: NEGATIVE
BASOPHILS # BLD: 0.06 K/UL (ref 0–0.2)
BASOPHILS NFR BLD: 1 % (ref 0–2)
BENZODIAZ UR QL: NEGATIVE
BILIRUB SERPL-MCNC: 0.3 MG/DL (ref 0–1.2)
BUN SERPL-MCNC: 15 MG/DL (ref 6–20)
BUPRENORPHINE UR QL: NEGATIVE
CALCIUM SERPL-MCNC: 9.5 MG/DL (ref 8.6–10.2)
CANNABINOIDS UR QL SCN: POSITIVE
CHLORIDE SERPL-SCNC: 102 MMOL/L (ref 98–107)
CO2 SERPL-SCNC: 29 MMOL/L (ref 22–29)
COCAINE UR QL SCN: NEGATIVE
CREAT SERPL-MCNC: 0.8 MG/DL (ref 0.7–1.2)
EKG ATRIAL RATE: 77 BPM
EKG P AXIS: 66 DEGREES
EKG P-R INTERVAL: 138 MS
EKG Q-T INTERVAL: 346 MS
EKG QRS DURATION: 74 MS
EKG QTC CALCULATION (BAZETT): 391 MS
EKG R AXIS: 52 DEGREES
EKG T AXIS: 50 DEGREES
EKG VENTRICULAR RATE: 77 BPM
EOSINOPHIL # BLD: 0.99 K/UL (ref 0.05–0.5)
EOSINOPHILS RELATIVE PERCENT: 11 % (ref 0–6)
ERYTHROCYTE [DISTWIDTH] IN BLOOD BY AUTOMATED COUNT: 12.7 % (ref 11.5–15)
ETHANOLAMINE SERPL-MCNC: <10 MG/DL
FENTANYL UR QL: NEGATIVE
GFR SERPL CREATININE-BSD FRML MDRD: >60 ML/MIN/1.73M2
GLUCOSE SERPL-MCNC: 83 MG/DL (ref 74–99)
HCT VFR BLD AUTO: 50 % (ref 37–54)
HGB BLD-MCNC: 16.2 G/DL (ref 12.5–16.5)
IMM GRANULOCYTES # BLD AUTO: 0.04 K/UL (ref 0–0.58)
IMM GRANULOCYTES NFR BLD: 0 % (ref 0–5)
LYMPHOCYTES NFR BLD: 1.89 K/UL (ref 1.5–4)
LYMPHOCYTES RELATIVE PERCENT: 20 % (ref 20–42)
MCH RBC QN AUTO: 29.2 PG (ref 26–35)
MCHC RBC AUTO-ENTMCNC: 32.4 G/DL (ref 32–34.5)
MCV RBC AUTO: 90.3 FL (ref 80–99.9)
METHADONE UR QL: NEGATIVE
MONOCYTES NFR BLD: 0.83 K/UL (ref 0.1–0.95)
MONOCYTES NFR BLD: 9 % (ref 2–12)
NEUTROPHILS NFR BLD: 59 % (ref 43–80)
NEUTS SEG NFR BLD: 5.52 K/UL (ref 1.8–7.3)
OPIATES UR QL SCN: NEGATIVE
OXYCODONE UR QL SCN: NEGATIVE
PCP UR QL SCN: NEGATIVE
PLATELET # BLD AUTO: 168 K/UL (ref 130–450)
PMV BLD AUTO: 9 FL (ref 7–12)
POTASSIUM SERPL-SCNC: 4.3 MMOL/L (ref 3.5–5)
PROT SERPL-MCNC: 7.1 G/DL (ref 6.4–8.3)
RBC # BLD AUTO: 5.54 M/UL (ref 3.8–5.8)
SALICYLATES SERPL-MCNC: <0.3 MG/DL (ref 0–30)
SODIUM SERPL-SCNC: 140 MMOL/L (ref 132–146)
TEST INFORMATION: ABNORMAL
TOXIC TRICYCLIC SC,BLOOD: NEGATIVE
WBC OTHER # BLD: 9.3 K/UL (ref 4.5–11.5)

## 2023-12-02 PROCEDURE — 80053 COMPREHEN METABOLIC PANEL: CPT

## 2023-12-02 PROCEDURE — 73130 X-RAY EXAM OF HAND: CPT

## 2023-12-02 PROCEDURE — G0480 DRUG TEST DEF 1-7 CLASSES: HCPCS

## 2023-12-02 PROCEDURE — 80179 DRUG ASSAY SALICYLATE: CPT

## 2023-12-02 PROCEDURE — 80143 DRUG ASSAY ACETAMINOPHEN: CPT

## 2023-12-02 PROCEDURE — 99281 EMR DPT VST MAYX REQ PHY/QHP: CPT

## 2023-12-02 PROCEDURE — 85025 COMPLETE CBC W/AUTO DIFF WBC: CPT

## 2023-12-02 PROCEDURE — 99285 EMERGENCY DEPT VISIT HI MDM: CPT

## 2023-12-02 PROCEDURE — 93005 ELECTROCARDIOGRAM TRACING: CPT | Performed by: NURSE PRACTITIONER

## 2023-12-02 PROCEDURE — 80307 DRUG TEST PRSMV CHEM ANLYZR: CPT

## 2023-12-02 NOTE — ED NOTES
Pt states his grandma is here to take him to another facility. He does not want the help now. I explained that SW can offer him places but he continues to want to leave. AMA paper signed.       Young Barrera RN  12/02/23 8238

## 2023-12-03 ENCOUNTER — HOSPITAL ENCOUNTER (EMERGENCY)
Age: 18
Discharge: HOME | End: 2023-12-03
Payer: MEDICARE

## 2023-12-03 ENCOUNTER — HOSPITAL ENCOUNTER (EMERGENCY)
Age: 18
Discharge: HOME | End: 2023-12-03
Attending: EMERGENCY MEDICINE
Payer: MEDICARE

## 2023-12-03 ENCOUNTER — HOSPITAL ENCOUNTER (EMERGENCY)
Age: 18
Discharge: HOME | End: 2023-12-03
Payer: COMMERCIAL

## 2023-12-03 VITALS
DIASTOLIC BLOOD PRESSURE: 86 MMHG | BODY MASS INDEX: 19.6 KG/M2 | TEMPERATURE: 97.9 F | WEIGHT: 140 LBS | RESPIRATION RATE: 14 BRPM | OXYGEN SATURATION: 99 % | SYSTOLIC BLOOD PRESSURE: 152 MMHG | HEIGHT: 71 IN | HEART RATE: 108 BPM

## 2023-12-03 VITALS
HEIGHT: 71 IN | WEIGHT: 140 LBS | OXYGEN SATURATION: 97 % | DIASTOLIC BLOOD PRESSURE: 88 MMHG | HEART RATE: 93 BPM | TEMPERATURE: 98 F | SYSTOLIC BLOOD PRESSURE: 127 MMHG | RESPIRATION RATE: 16 BRPM | BODY MASS INDEX: 19.6 KG/M2

## 2023-12-03 DIAGNOSIS — M79.641 RIGHT HAND PAIN: Primary | ICD-10-CM

## 2023-12-03 DIAGNOSIS — F39 MOOD DISORDER (HCC): ICD-10-CM

## 2023-12-03 DIAGNOSIS — F39 MOOD DISORDER (HCC): Primary | ICD-10-CM

## 2023-12-03 DIAGNOSIS — S60.221A CONTUSION OF RIGHT HAND, INITIAL ENCOUNTER: Primary | ICD-10-CM

## 2023-12-03 PROCEDURE — 99283 EMERGENCY DEPT VISIT LOW MDM: CPT

## 2023-12-03 PROCEDURE — 6370000000 HC RX 637 (ALT 250 FOR IP): Performed by: NURSE PRACTITIONER

## 2023-12-03 PROCEDURE — 99282 EMERGENCY DEPT VISIT SF MDM: CPT

## 2023-12-03 RX ORDER — IBUPROFEN 800 MG/1
800 TABLET ORAL EVERY 6 HOURS PRN
Qty: 21 TABLET | Refills: 0 | Status: SHIPPED | OUTPATIENT
Start: 2023-12-03 | End: 2023-12-03

## 2023-12-03 RX ORDER — IBUPROFEN 800 MG/1
800 TABLET ORAL ONCE
Status: COMPLETED | OUTPATIENT
Start: 2023-12-03 | End: 2023-12-03

## 2023-12-03 RX ADMIN — IBUPROFEN 800 MG: 800 TABLET, FILM COATED ORAL at 12:39

## 2023-12-03 ASSESSMENT — LIFESTYLE VARIABLES
HOW OFTEN DO YOU HAVE A DRINK CONTAINING ALCOHOL: NEVER
HOW OFTEN DO YOU HAVE A DRINK CONTAINING ALCOHOL: NEVER
HOW MANY STANDARD DRINKS CONTAINING ALCOHOL DO YOU HAVE ON A TYPICAL DAY: PATIENT DOES NOT DRINK
HOW MANY STANDARD DRINKS CONTAINING ALCOHOL DO YOU HAVE ON A TYPICAL DAY: PATIENT DOES NOT DRINK

## 2023-12-03 ASSESSMENT — PAIN SCALES - GENERAL: PAINLEVEL_OUTOF10: 10

## 2023-12-03 ASSESSMENT — PAIN - FUNCTIONAL ASSESSMENT: PAIN_FUNCTIONAL_ASSESSMENT: NONE - DENIES PAIN

## 2023-12-03 ASSESSMENT — PAIN DESCRIPTION - LOCATION: LOCATION: HAND

## 2023-12-03 NOTE — DISCHARGE INSTRUCTIONS
Help Hotline 196 454-6578 or 6-473.564.3087 or 211   24 hour crisis line for the following counties  [address]  [facility] [address] Mara Sensor    PEER WARM LINE: [phone]  Monday through Friday 4pm to 8pm and Saturday 1pm-3pm   You can call during these times to talk with a peer support person as needed          [facility]  [address] [facility] CARE CENTER, [address] [facility]  [phone]    [facility]   [address] [facility], 200 [facility]  Free hot meals Mondays and Tuesday 4PM-6PM sit down dinner at [facility]  Free hot showers Wednesday 1:15-3:45PM    [facility]) - Women and Children only   [address] [facility]   [address] [facility], [facility], 7[address] [facility] (women only)  [address] 59  111 [facility] (men only)  [address], [facility], [address] [facility] [address]  [phone]  [address]   [phone]    [facility] QTXHPE IJKL[facility])  Women - [phone] ([address])  Men - [phone] ext. [phone] [place])  Family - [phone] ext. [phone] (130 [address])    [address] [place], [place])  [address] [facility], 1350 On license of [facility] (limited a two day stay if non-resident)  [place] [facility] [place], [place])  600 [facility]  [phone]

## 2023-12-03 NOTE — ED NOTES
Pt is alert, oriented x 3, slightly anxious, stable mood/behavior, poor insight/judgment, thought process appears at baseline, normal speech, well dressed. SW met with Pt briefly and requested to go to the Crisis Unit. SW confirmed there are no open bed. Pt requested to go to the [facility]. SW provided Pt print out of homeless shelters. Pt requested a taxi voucher to go to [address] [facility]. At this time we are unable to provide transportation assistance. Pt reports to  that he can try and arrange a ride with his insurance [employer]. Pt denies to any SI/HI, A/V hallucinations or paranoia. Patient is not currently presenting with any acute psychiatric symptoms that would warrant inpatient or crisis unit referral. Patient symptoms can be effectively managed with an outpatient provider. Pt is not currently presenting with any acute psychiatric symptoms. Pt can follow up with his outpatient provider Wilhelminia Severance. ED physician updated.       CHELSEY Cadena, [address] [facility], [place]  12/03/23 1845

## 2023-12-04 ENCOUNTER — HOSPITAL ENCOUNTER (EMERGENCY)
Age: 18
Discharge: HOME | End: 2023-12-04
Payer: COMMERCIAL

## 2023-12-04 VITALS
RESPIRATION RATE: 15 BRPM | OXYGEN SATURATION: 97 % | HEART RATE: 100 BPM | DIASTOLIC BLOOD PRESSURE: 86 MMHG | SYSTOLIC BLOOD PRESSURE: 143 MMHG | TEMPERATURE: 97.4 F

## 2023-12-04 DIAGNOSIS — Z20.822 COVID-19 RULED OUT BY LABORATORY TESTING: ICD-10-CM

## 2023-12-04 DIAGNOSIS — R11.2 NAUSEA AND VOMITING, UNSPECIFIED VOMITING TYPE: Primary | ICD-10-CM

## 2023-12-04 LAB
EKG ATRIAL RATE: 77 BPM
EKG P AXIS: 66 DEGREES
EKG P-R INTERVAL: 138 MS
EKG Q-T INTERVAL: 346 MS
EKG QRS DURATION: 74 MS
EKG QTC CALCULATION (BAZETT): 391 MS
EKG R AXIS: 52 DEGREES
EKG T AXIS: 50 DEGREES
EKG VENTRICULAR RATE: 77 BPM
FLUAV RNA RESP QL NAA+PROBE: NOT DETECTED
FLUBV RNA RESP QL NAA+PROBE: NOT DETECTED
SARS-COV-2 RNA RESP QL NAA+PROBE: NOT DETECTED
SOURCE: NORMAL
SPECIMEN DESCRIPTION: NORMAL

## 2023-12-04 PROCEDURE — 87636 SARSCOV2 & INF A&B AMP PRB: CPT

## 2023-12-04 PROCEDURE — 99283 EMERGENCY DEPT VISIT LOW MDM: CPT

## 2023-12-04 ASSESSMENT — PAIN - FUNCTIONAL ASSESSMENT: PAIN_FUNCTIONAL_ASSESSMENT: NONE - DENIES PAIN

## 2023-12-04 NOTE — ED PROVIDER NOTES
[address] [place]  Department of Emergency Medicine   ED  Encounter Note  Admit Date/RoomTime: 2023  9:36 PM  ED Room: [place] [address]    NAME: Jose Cornelius  : 2005  MRN: [id number]     Chief Complaint:  Emesis (X3 times this morning. States wants to r/o covid. Denies abdominal pain/fever)    History of Present Illness        Jose Cornelius is a 19 y.o. old male who presents to the emergency department by private vehicle, for 3 episodes of vomiting this morning. Patient denies vomiting now and has been tolerating fluids. Patient denies any abdominal pain and would just like COVID 19 testing. Patient also reports nasal congestion. Denies sick contacts. Patient states his symptoms are mild in severity. Patient denies anything making it worse. Denies fever/chills, headache, vision change, dizziness, cough, sore throat, chest pain, dyspnea, abdominal pain, diarrhea, hematemesis, melena, bloody stool, urinary symptoms, numbness/weakness. ROS   Pertinent positives and negatives are stated within HPI, all other systems reviewed and are negative. Past Medical History:  has a past medical history of ADHD (attention deficit hyperactivity disorder), Autism disorder, GERD (gastroesophageal reflux disease), Hypotonia, Intermittent explosive disorder, Microcephalic ([address]), and Schizoaffective disorder ([address]). Surgical History:  has no past surgical history on file. Social History:  reports that he has been smoking cigarettes. He has a 6.00 pack-year smoking history. He has never used smokeless tobacco. He reports that he does not currently use alcohol after a past usage of about 15.0 standard drinks of alcohol per week. He reports that he does not currently use drugs after having used the following drugs: Marijuana Christopher Powers). Frequency: 7.00 times per week. Family History: family history is not on file.      Allergies: Strawberry    Physical Exam   Oxygen

## 2023-12-05 ENCOUNTER — HOSPITAL ENCOUNTER (EMERGENCY)
Age: 18
Discharge: HOME HEALTH | End: 2023-12-05
Payer: MEDICARE

## 2023-12-05 VITALS
TEMPERATURE: 97.9 F | OXYGEN SATURATION: 97 % | DIASTOLIC BLOOD PRESSURE: 71 MMHG | HEART RATE: 104 BPM | SYSTOLIC BLOOD PRESSURE: 132 MMHG

## 2023-12-05 DIAGNOSIS — R51.9 NONINTRACTABLE HEADACHE, UNSPECIFIED CHRONICITY PATTERN, UNSPECIFIED HEADACHE TYPE: Primary | ICD-10-CM

## 2023-12-05 PROCEDURE — 99281 EMR DPT VST MAYX REQ PHY/QHP: CPT

## 2023-12-05 RX ORDER — ACETAMINOPHEN 500 MG
1000 TABLET ORAL ONCE
Status: DISCONTINUED | OUTPATIENT
Start: 2023-12-05 | End: 2023-12-05 | Stop reason: HOSPADM

## 2023-12-08 ENCOUNTER — HOSPITAL ENCOUNTER (EMERGENCY)
Age: 18
Discharge: LEFT BEFORE DISCHARGE | End: 2023-12-09
Attending: EMERGENCY MEDICINE
Payer: COMMERCIAL

## 2023-12-08 DIAGNOSIS — F41.1 ANXIETY STATE: Primary | ICD-10-CM

## 2023-12-08 LAB
ALBUMIN SERPL-MCNC: 4.4 G/DL (ref 3.5–5.2)
ALP SERPL-CCNC: 88 U/L (ref 40–129)
ALT SERPL-CCNC: 23 U/L (ref 0–40)
AMPHET UR QL SCN: NEGATIVE
ANION GAP SERPL CALCULATED.3IONS-SCNC: 11 MMOL/L (ref 7–16)
APAP SERPL-MCNC: <5 UG/ML (ref 10–30)
AST SERPL-CCNC: 23 U/L (ref 0–39)
BARBITURATES UR QL SCN: NEGATIVE
BASOPHILS # BLD: 0.05 K/UL (ref 0–0.2)
BASOPHILS NFR BLD: 0 % (ref 0–2)
BENZODIAZ UR QL: NEGATIVE
BILIRUB SERPL-MCNC: 0.2 MG/DL (ref 0–1.2)
BILIRUB UR QL STRIP: NEGATIVE
BUN SERPL-MCNC: 10 MG/DL (ref 6–20)
BUPRENORPHINE UR QL: NEGATIVE
CALCIUM SERPL-MCNC: 9.3 MG/DL (ref 8.6–10.2)
CANNABINOIDS UR QL SCN: NEGATIVE
CHLORIDE SERPL-SCNC: 102 MMOL/L (ref 98–107)
CLARITY UR: CLEAR
CO2 SERPL-SCNC: 26 MMOL/L (ref 22–29)
COCAINE UR QL SCN: NEGATIVE
COLOR UR: YELLOW
COMMENT: NORMAL
CREAT SERPL-MCNC: 0.9 MG/DL (ref 0.7–1.2)
EKG ATRIAL RATE: 88 BPM
EKG P AXIS: 76 DEGREES
EKG P-R INTERVAL: 140 MS
EKG Q-T INTERVAL: 326 MS
EKG QRS DURATION: 68 MS
EKG QTC CALCULATION (BAZETT): 394 MS
EKG R AXIS: 49 DEGREES
EKG T AXIS: 56 DEGREES
EKG VENTRICULAR RATE: 88 BPM
EOSINOPHIL # BLD: 1.11 K/UL (ref 0.05–0.5)
EOSINOPHILS RELATIVE PERCENT: 9 % (ref 0–6)
ERYTHROCYTE [DISTWIDTH] IN BLOOD BY AUTOMATED COUNT: 12.3 % (ref 11.5–15)
ETHANOLAMINE SERPL-MCNC: <10 MG/DL
FENTANYL UR QL: NEGATIVE
GFR SERPL CREATININE-BSD FRML MDRD: >60 ML/MIN/1.73M2
GLUCOSE SERPL-MCNC: 76 MG/DL (ref 74–99)
GLUCOSE UR STRIP-MCNC: NEGATIVE MG/DL
HCT VFR BLD AUTO: 48.7 % (ref 37–54)
HGB BLD-MCNC: 16.1 G/DL (ref 12.5–16.5)
HGB UR QL STRIP.AUTO: NEGATIVE
IMM GRANULOCYTES # BLD AUTO: 0.12 K/UL (ref 0–0.58)
IMM GRANULOCYTES NFR BLD: 1 % (ref 0–5)
KETONES UR STRIP-MCNC: NEGATIVE MG/DL
LEUKOCYTE ESTERASE UR QL STRIP: NEGATIVE
LYMPHOCYTES NFR BLD: 2.54 K/UL (ref 1.5–4)
LYMPHOCYTES RELATIVE PERCENT: 21 % (ref 20–42)
MCH RBC QN AUTO: 29.2 PG (ref 26–35)
MCHC RBC AUTO-ENTMCNC: 33.1 G/DL (ref 32–34.5)
MCV RBC AUTO: 88.2 FL (ref 80–99.9)
METHADONE UR QL: NEGATIVE
MONOCYTES NFR BLD: 1.01 K/UL (ref 0.1–0.95)
MONOCYTES NFR BLD: 9 % (ref 2–12)
NEUTROPHILS NFR BLD: 59 % (ref 43–80)
NEUTS SEG NFR BLD: 7.03 K/UL (ref 1.8–7.3)
NITRITE UR QL STRIP: NEGATIVE
OPIATES UR QL SCN: NEGATIVE
OXYCODONE UR QL SCN: NEGATIVE
PCP UR QL SCN: NEGATIVE
PH UR STRIP: 8 [PH] (ref 5–9)
PLATELET # BLD AUTO: 179 K/UL (ref 130–450)
PMV BLD AUTO: 8.5 FL (ref 7–12)
POTASSIUM SERPL-SCNC: 4.5 MMOL/L (ref 3.5–5)
PROT SERPL-MCNC: 6.8 G/DL (ref 6.4–8.3)
PROT UR STRIP-MCNC: NEGATIVE MG/DL
RBC # BLD AUTO: 5.52 M/UL (ref 3.8–5.8)
SALICYLATES SERPL-MCNC: <0.3 MG/DL (ref 0–30)
SODIUM SERPL-SCNC: 139 MMOL/L (ref 132–146)
SP GR UR STRIP: 1.01 (ref 1–1.03)
TEST INFORMATION: NORMAL
TOXIC TRICYCLIC SC,BLOOD: NEGATIVE
TROPONIN I SERPL HS-MCNC: 7 NG/L (ref 0–11)
UROBILINOGEN UR STRIP-ACNC: 0.2 EU/DL (ref 0–1)
WBC OTHER # BLD: 11.9 K/UL (ref 4.5–11.5)

## 2023-12-08 PROCEDURE — 80179 DRUG ASSAY SALICYLATE: CPT

## 2023-12-08 PROCEDURE — 93005 ELECTROCARDIOGRAM TRACING: CPT | Performed by: EMERGENCY MEDICINE

## 2023-12-08 PROCEDURE — 80053 COMPREHEN METABOLIC PANEL: CPT

## 2023-12-08 PROCEDURE — 84484 ASSAY OF TROPONIN QUANT: CPT

## 2023-12-08 PROCEDURE — 85025 COMPLETE CBC W/AUTO DIFF WBC: CPT

## 2023-12-08 PROCEDURE — 81003 URINALYSIS AUTO W/O SCOPE: CPT

## 2023-12-08 PROCEDURE — G0480 DRUG TEST DEF 1-7 CLASSES: HCPCS

## 2023-12-08 PROCEDURE — 80307 DRUG TEST PRSMV CHEM ANLYZR: CPT

## 2023-12-08 PROCEDURE — 99285 EMERGENCY DEPT VISIT HI MDM: CPT

## 2023-12-08 PROCEDURE — 80143 DRUG ASSAY ACETAMINOPHEN: CPT

## 2023-12-08 ASSESSMENT — PAIN - FUNCTIONAL ASSESSMENT: PAIN_FUNCTIONAL_ASSESSMENT: NONE - DENIES PAIN

## 2023-12-09 ENCOUNTER — HOSPITAL ENCOUNTER (EMERGENCY)
Age: 18
Discharge: LEFT BEFORE DISCHARGE | End: 2023-12-09
Attending: EMERGENCY MEDICINE
Payer: COMMERCIAL

## 2023-12-09 VITALS
HEIGHT: 69 IN | DIASTOLIC BLOOD PRESSURE: 68 MMHG | WEIGHT: 140 LBS | BODY MASS INDEX: 20.73 KG/M2 | RESPIRATION RATE: 12 BRPM | TEMPERATURE: 98.5 F | SYSTOLIC BLOOD PRESSURE: 114 MMHG | OXYGEN SATURATION: 97 % | HEART RATE: 82 BPM

## 2023-12-09 VITALS
DIASTOLIC BLOOD PRESSURE: 80 MMHG | TEMPERATURE: 97.3 F | SYSTOLIC BLOOD PRESSURE: 132 MMHG | RESPIRATION RATE: 18 BRPM | HEART RATE: 92 BPM | OXYGEN SATURATION: 98 %

## 2023-12-09 DIAGNOSIS — F39 MOOD DISORDER (HCC): ICD-10-CM

## 2023-12-09 DIAGNOSIS — F41.9 ANXIETY: Primary | ICD-10-CM

## 2023-12-09 LAB
AMMONIA PLAS-SCNC: 19 UMOL/L (ref 16–60)
AMPHET UR QL SCN: NEGATIVE
APAP SERPL-MCNC: <5 UG/ML (ref 10–30)
BARBITURATES UR QL SCN: NEGATIVE
BENZODIAZ UR QL: NEGATIVE
BUPRENORPHINE UR QL: NEGATIVE
CANNABINOIDS UR QL SCN: NEGATIVE
COCAINE UR QL SCN: NEGATIVE
ETHANOLAMINE SERPL-MCNC: <10 MG/DL
FENTANYL UR QL: NEGATIVE
METHADONE UR QL: NEGATIVE
OPIATES UR QL SCN: NEGATIVE
OXYCODONE UR QL SCN: NEGATIVE
PCP UR QL SCN: NEGATIVE
SALICYLATES SERPL-MCNC: <0.3 MG/DL (ref 0–30)
TEST INFORMATION: NORMAL
TOXIC TRICYCLIC SC,BLOOD: NEGATIVE
VALPROATE SERPL-MCNC: 7 UG/ML (ref 50–100)

## 2023-12-09 PROCEDURE — 80164 ASSAY DIPROPYLACETIC ACD TOT: CPT

## 2023-12-09 PROCEDURE — 80307 DRUG TEST PRSMV CHEM ANLYZR: CPT

## 2023-12-09 PROCEDURE — 6370000000 HC RX 637 (ALT 250 FOR IP): Performed by: EMERGENCY MEDICINE

## 2023-12-09 PROCEDURE — G0480 DRUG TEST DEF 1-7 CLASSES: HCPCS

## 2023-12-09 PROCEDURE — 82140 ASSAY OF AMMONIA: CPT

## 2023-12-09 PROCEDURE — 80179 DRUG ASSAY SALICYLATE: CPT

## 2023-12-09 PROCEDURE — 80143 DRUG ASSAY ACETAMINOPHEN: CPT

## 2023-12-09 PROCEDURE — 99283 EMERGENCY DEPT VISIT LOW MDM: CPT

## 2023-12-09 RX ORDER — OLANZAPINE 5 MG/1
5 TABLET ORAL NIGHTLY
Qty: 30 TABLET | Refills: 2 | Status: SHIPPED | OUTPATIENT
Start: 2023-12-09 | End: 2024-03-08

## 2023-12-09 RX ORDER — HYDROXYZINE PAMOATE 25 MG/1
25 CAPSULE ORAL
Status: COMPLETED | OUTPATIENT
Start: 2023-12-09 | End: 2023-12-09

## 2023-12-09 RX ORDER — DIVALPROEX SODIUM 250 MG/1
750 TABLET, DELAYED RELEASE ORAL 2 TIMES DAILY
Qty: 180 TABLET | Refills: 2 | Status: SHIPPED | OUTPATIENT
Start: 2023-12-09 | End: 2024-03-08

## 2023-12-09 RX ORDER — HYDROXYZINE HYDROCHLORIDE 50 MG/ML
25 INJECTION, SOLUTION INTRAMUSCULAR ONCE
Status: DISCONTINUED | OUTPATIENT
Start: 2023-12-09 | End: 2023-12-09

## 2023-12-09 RX ADMIN — HYDROXYZINE PAMOATE 25 MG: 25 CAPSULE ORAL at 13:33

## 2023-12-09 ASSESSMENT — PAIN - FUNCTIONAL ASSESSMENT: PAIN_FUNCTIONAL_ASSESSMENT: NONE - DENIES PAIN

## 2023-12-09 NOTE — ED NOTES
Pt needs to have his medications to go to [address] [facility], [address] [place], [place]  12/09/23 102

## 2023-12-09 NOTE — ED NOTES
Call from Dr Klaudia Amaya reporting this pt who eloped from [facility] earlier this date 12//9/2023. Pt had presented reporting anxiety and requesting medications for this. Pt also reported he has a hx of being prescribed other psychiatric medications which he reports he is compliant with but does not have with him. Pt was assessed by Section [facility] SW and denied SI, HI. Pt was referred to the Crisis Unit but eloped approximately 10:00 per SW notes. Call from Dr Jenna Brand reporting pt [facility] at [facility] ED requesting medication for anxiety. SW will call [address] [place] [address] Crisis Unit to determine what their determination was regarding admission. Call to Crisis Unit [phone], spoke with Vince who reports pt can come to Crisis Unit if he has prescribed medications with him. Reports they cannot accept pt without medications as they will not have a prescriber available until Tuesday. Spoke with Dr Klaudia Amaya at [facility] ED, reports pt states he is prescribed medications but has not taken them in over a month. Dr Klaudia Amaya also reports she received a call from pt grandmother who reported pt had called her and stated [facility] was holding him against his will. Grandmother also reported to Dr Klaudia Amaya that pt's behavior \". .. has been out of control. \"   Dr Klaudia Amaya is requesting pt be re-assessed by SW to determine appropriate course of action. Pt placed on list to be re-assessed.        Margarette Vega [address] [place] [address]  12/09/23 [place], [facility] [address] [place], [place]  12/09/23 2203

## 2023-12-09 NOTE — ED NOTES
SW contacted CSU, spoke to Justyna who was informed patient is wanting admitted for his anxiety and has no current meds with him. Justyna said to fax the packet.      Matt Powell, [facility]  12/09/23 8194

## 2023-12-09 NOTE — ED NOTES
Behavioral Health Crisis Assessment      Chief Complaint: \"My anxiety is bad\"    Mental Status Exam: Patient is alert/oriented X3. Patient appeared sedated, was just sleeping. Fleeting eye contact, mumbling speech. Patient with fair insight and judgement. Patient denied having hallucinations. Patient denied having SI, denied having HI. Legal Status:  [x] Voluntary:  [] Involuntary, Issued by:    Gender:  [x] Male [] Female [] Transgender  [] Other    Sexual Orientation:  [x] Heterosexual [] Homosexual [] Bisexual [] Other    Brief Clinical Summary:    Patient is a 19year old male who presented to the ED as a walk-in. Patient reported to the ED doctor that he's been anxious the last several days. SW met with patient for assessment. Patient stated he needs to be put on anxiety meds. \"I've never been on anxiety meds. \" Patient noted compliance with other meds but does not have them with him. Patient is requesting a referral to CSU to get on anxiety meds. Patient denied having hallucinations, denied having SI, denied having HI. Patient stated he lives with his brother in [place]. Per chart, patient diagnosis is Bipolar I with a history of several [facility] admission since 2021, last being 11/17/23. Patient denied having any AOD issues/treatment history. Patient denied having a legal guardian or POA.     Collateral Information: None    Risk Factors:  Poor med compliance  Poor tx compliance  Disability  Lack of essential needs  Poor communication skills  Probation  Hx of Multiple psych admits    Protective Factors:  Help-seeking behavior  Denied hx of SI  No access to weapons  Goals and hope for the future  Has a provider    Suicidal Ideations:  [] Reports:    [] Past [] Present   [x] Denies    Suicide Attempts:  [] Reports:   [x] Denies    C-SSRS Screening Completed by RN: Current Suicide Risk:  [x] No Risk [] Low [] Moderate [] High    Homicidal Ideations:  [] Reports:   [] Past [] Present   [x] Denies     Self

## 2023-12-09 NOTE — ED PROVIDER NOTES
[address] [place] [address], [facility] ENCOUNTER        Pt Name: Jose Cornelius  MRN: [id number]  [address] 2005  Date of evaluation: 12/9/2023  Provider: Migel Wagner DO  PCP: No primary care provider on file. Note Started: 1:13 PM EST 12/9/23    CHIEF COMPLAINT       Chief Complaint   Patient presents with    Anxiety     Bad anxiety, ongoing a few days, denies SI/HI       HISTORY OF PRESENT ILLNESS: 1 or more Elements   History From: patient and medical record    Limitations to history : None    Jose Cornelius is a 19 y.o. male who presents with anxeity for some time. Patient does have history of autism, ADHD and schizophrenia. He reports he is supposed to be on Depakote but has been out for quite some time, at least a month. He states he also was supposed to take other medications but has not taken them for quite some time as well. His medication list includes Risperdal injection once a month and Zyprexa nightly. He presents asking for something for anxiety, states he needs \"the strongest thing, like 250 mg\". He adamantly denies suicidal ideation, homicidal ideation, paranoia or hallucinations. Patient was just seen this morning by the [facility] emergency department for the exact complaint and was evaluated but aided by social work. 3 hours ago social work was assisting patient to go to the crisis unit but was advised he needs to have his medications. It was noted then patient eloped from the emergency department. Patient presents to this emergency department asking for medication for anxiety. The complaint has been persistent, moderate in severity, and worsened by nothing.   Patient denies fever/chills, sore throat, cough, congestion, chest pain, shortness of breath, edema, headache, visual disturbances, focal paresthesias, focal weakness, abdominal pain, nausea, vomiting, diarrhea, constipation, dysuria, hematuria, trauma, neck or anxiety is uncontrolled and he is unable to get his medications as an outpatient. Spoke with his listed pharmacy, [facility], to verify meds. Shared decision making was used to determine testing, treatments and disposition. They have his Depakote last calling in in March but never picked up and Risperdal injection picked up 10/31/23. Per medical record, patient had a mental health admission to [address] [place] [address] and had oral Zyprexa, Haldol injection in addition to Benadryl, melatonin and prescriptions were called into the [address] [place] [address] pharmacy. It is on verifiable at this time if patient actually picked those up as the pharmacy today is closed. Patient is adamant that he does not have any medications. He requested I call in his medications to [address] [place] [address] here and that we call his insurance company to advise them we are discharging him so he can get a ride. He is declining to stay any longer and is electing to leave [address] [place] [address] because he feels better and does not want to stay. He poses no risk to himself or others. Depakote level pending as well as serum urine drug screen and ammonia level. He was provided information to follow-up with [facility] health and counseling services and to return to the ER if his symptoms worsen. This patient has chosen to leave against medical advice. I have personally explained to them that choosing to do so may result in permanent bodily harm or death. I discussed at length that without further evaluation and monitoring there may be unforeseen circumstances and deterioration resulting in permanent bodily harm or death as a result of their choice. They are alert, oriented, and competent at this time. They state that they are aware of the serious risks as explained, but they continue to wish to leave against medical advice.     In light of their decision to leave against medical advice, follow-up has been discussed and they are aware of the importance of

## 2023-12-09 NOTE — DISCHARGE INSTRUCTIONS
Help [address] [place] [address] Road  12[address] [facility], [facility], 102 [facility]  [phone]    [facility]  [address] [facility], 200 [facility]  [phone]    [address], 200 [facility]  : [phone]

## 2023-12-11 LAB
EKG ATRIAL RATE: 88 BPM
EKG P AXIS: 76 DEGREES
EKG P-R INTERVAL: 140 MS
EKG Q-T INTERVAL: 326 MS
EKG QRS DURATION: 68 MS
EKG QTC CALCULATION (BAZETT): 394 MS
EKG R AXIS: 49 DEGREES
EKG T AXIS: 56 DEGREES
EKG VENTRICULAR RATE: 88 BPM

## 2023-12-11 PROCEDURE — 93010 ELECTROCARDIOGRAM REPORT: CPT | Performed by: INTERNAL MEDICINE

## 2023-12-12 ENCOUNTER — APPOINTMENT (OUTPATIENT)
Dept: GENERAL RADIOLOGY | Age: 18
End: 2023-12-12

## 2023-12-12 ENCOUNTER — HOSPITAL ENCOUNTER (EMERGENCY)
Age: 18
Discharge: LEFT BEFORE DISCHARGE | End: 2023-12-12

## 2023-12-12 VITALS
OXYGEN SATURATION: 98 % | DIASTOLIC BLOOD PRESSURE: 82 MMHG | RESPIRATION RATE: 18 BRPM | HEART RATE: 100 BPM | SYSTOLIC BLOOD PRESSURE: 143 MMHG | TEMPERATURE: 98.4 F

## 2023-12-12 DIAGNOSIS — G89.29 CHRONIC PAIN OF RIGHT ANKLE: Primary | ICD-10-CM

## 2023-12-12 DIAGNOSIS — M25.571 CHRONIC PAIN OF RIGHT ANKLE: Primary | ICD-10-CM

## 2023-12-12 PROCEDURE — 99283 EMERGENCY DEPT VISIT LOW MDM: CPT

## 2023-12-12 PROCEDURE — 73610 X-RAY EXAM OF ANKLE: CPT

## 2023-12-12 ASSESSMENT — PAIN - FUNCTIONAL ASSESSMENT: PAIN_FUNCTIONAL_ASSESSMENT: NONE - DENIES PAIN

## 2023-12-14 ENCOUNTER — HOSPITAL ENCOUNTER (EMERGENCY)
Age: 18
Discharge: HOME | End: 2023-12-15
Payer: COMMERCIAL

## 2023-12-14 DIAGNOSIS — F41.9 ANXIETY: Primary | ICD-10-CM

## 2023-12-14 LAB
ALBUMIN SERPL-MCNC: 4.4 G/DL (ref 3.5–5.2)
ALP SERPL-CCNC: 79 U/L (ref 40–129)
ALT SERPL-CCNC: 18 U/L (ref 0–40)
AMPHET UR QL SCN: NEGATIVE
ANION GAP SERPL CALCULATED.3IONS-SCNC: 14 MMOL/L (ref 7–16)
APAP SERPL-MCNC: <5 UG/ML (ref 10–30)
AST SERPL-CCNC: 22 U/L (ref 0–39)
BARBITURATES UR QL SCN: NEGATIVE
BASOPHILS # BLD: 0.05 K/UL (ref 0–0.2)
BASOPHILS NFR BLD: 1 % (ref 0–2)
BENZODIAZ UR QL: NEGATIVE
BILIRUB SERPL-MCNC: 0.2 MG/DL (ref 0–1.2)
BILIRUB UR QL STRIP: NEGATIVE
BUN SERPL-MCNC: 14 MG/DL (ref 6–20)
BUPRENORPHINE UR QL: NEGATIVE
CALCIUM SERPL-MCNC: 9.3 MG/DL (ref 8.6–10.2)
CANNABINOIDS UR QL SCN: POSITIVE
CHLORIDE SERPL-SCNC: 100 MMOL/L (ref 98–107)
CLARITY UR: CLEAR
CO2 SERPL-SCNC: 25 MMOL/L (ref 22–29)
COCAINE UR QL SCN: NEGATIVE
COLOR UR: YELLOW
COMMENT: NORMAL
CREAT SERPL-MCNC: 0.8 MG/DL (ref 0.7–1.2)
DATE LAST DOSE: ABNORMAL
EOSINOPHIL # BLD: 0.64 K/UL (ref 0.05–0.5)
EOSINOPHILS RELATIVE PERCENT: 6 % (ref 0–6)
ERYTHROCYTE [DISTWIDTH] IN BLOOD BY AUTOMATED COUNT: 12.6 % (ref 11.5–15)
ETHANOLAMINE SERPL-MCNC: <10 MG/DL
FENTANYL UR QL: NEGATIVE
GFR SERPL CREATININE-BSD FRML MDRD: >60 ML/MIN/1.73M2
GLUCOSE SERPL-MCNC: 111 MG/DL (ref 74–99)
GLUCOSE UR STRIP-MCNC: NEGATIVE MG/DL
HCT VFR BLD AUTO: 47.5 % (ref 37–54)
HGB BLD-MCNC: 16 G/DL (ref 12.5–16.5)
HGB UR QL STRIP.AUTO: NEGATIVE
IMM GRANULOCYTES # BLD AUTO: 0.07 K/UL (ref 0–0.58)
IMM GRANULOCYTES NFR BLD: 1 % (ref 0–5)
KETONES UR STRIP-MCNC: NEGATIVE MG/DL
LEUKOCYTE ESTERASE UR QL STRIP: NEGATIVE
LYMPHOCYTES NFR BLD: 2.08 K/UL (ref 1.5–4)
LYMPHOCYTES RELATIVE PERCENT: 20 % (ref 20–42)
MCH RBC QN AUTO: 29.7 PG (ref 26–35)
MCHC RBC AUTO-ENTMCNC: 33.7 G/DL (ref 32–34.5)
MCV RBC AUTO: 88.1 FL (ref 80–99.9)
METHADONE UR QL: NEGATIVE
MONOCYTES NFR BLD: 0.56 K/UL (ref 0.1–0.95)
MONOCYTES NFR BLD: 5 % (ref 2–12)
NEUTROPHILS NFR BLD: 67 % (ref 43–80)
NEUTS SEG NFR BLD: 6.9 K/UL (ref 1.8–7.3)
NITRITE UR QL STRIP: NEGATIVE
OPIATES UR QL SCN: NEGATIVE
OXYCODONE UR QL SCN: NEGATIVE
PCP UR QL SCN: NEGATIVE
PH UR STRIP: 6.5 [PH] (ref 5–9)
PLATELET # BLD AUTO: 213 K/UL (ref 130–450)
PMV BLD AUTO: 8.2 FL (ref 7–12)
POTASSIUM SERPL-SCNC: 3.9 MMOL/L (ref 3.5–5)
PROT SERPL-MCNC: 6.9 G/DL (ref 6.4–8.3)
PROT UR STRIP-MCNC: NEGATIVE MG/DL
RBC # BLD AUTO: 5.39 M/UL (ref 3.8–5.8)
SALICYLATES SERPL-MCNC: <0.3 MG/DL (ref 0–30)
SODIUM SERPL-SCNC: 139 MMOL/L (ref 132–146)
SP GR UR STRIP: 1.02 (ref 1–1.03)
TEST INFORMATION: ABNORMAL
TME LAST DOSE: ABNORMAL H
TOXIC TRICYCLIC SC,BLOOD: NEGATIVE
UROBILINOGEN UR STRIP-ACNC: 0.2 EU/DL (ref 0–1)
VALPROATE SERPL-MCNC: 13 UG/ML (ref 50–100)
VANCOMYCIN DOSE: ABNORMAL MG
WBC OTHER # BLD: 10.3 K/UL (ref 4.5–11.5)

## 2023-12-14 PROCEDURE — 81003 URINALYSIS AUTO W/O SCOPE: CPT

## 2023-12-14 PROCEDURE — G0480 DRUG TEST DEF 1-7 CLASSES: HCPCS

## 2023-12-14 PROCEDURE — 80179 DRUG ASSAY SALICYLATE: CPT

## 2023-12-14 PROCEDURE — 80307 DRUG TEST PRSMV CHEM ANLYZR: CPT

## 2023-12-14 PROCEDURE — 93005 ELECTROCARDIOGRAM TRACING: CPT | Performed by: NURSE PRACTITIONER

## 2023-12-14 PROCEDURE — 80164 ASSAY DIPROPYLACETIC ACD TOT: CPT

## 2023-12-14 PROCEDURE — 80143 DRUG ASSAY ACETAMINOPHEN: CPT

## 2023-12-14 PROCEDURE — 85025 COMPLETE CBC W/AUTO DIFF WBC: CPT

## 2023-12-14 PROCEDURE — 99284 EMERGENCY DEPT VISIT MOD MDM: CPT

## 2023-12-14 PROCEDURE — 80053 COMPREHEN METABOLIC PANEL: CPT

## 2023-12-14 ASSESSMENT — LIFESTYLE VARIABLES: HOW OFTEN DO YOU HAVE A DRINK CONTAINING ALCOHOL: NEVER

## 2023-12-14 ASSESSMENT — PAIN - FUNCTIONAL ASSESSMENT: PAIN_FUNCTIONAL_ASSESSMENT: NONE - DENIES PAIN

## 2023-12-15 ENCOUNTER — HOSPITAL ENCOUNTER (EMERGENCY)
Age: 18
Discharge: HOME | End: 2023-12-15
Attending: EMERGENCY MEDICINE

## 2023-12-15 VITALS
RESPIRATION RATE: 18 BRPM | OXYGEN SATURATION: 98 % | TEMPERATURE: 97.5 F | HEART RATE: 86 BPM | SYSTOLIC BLOOD PRESSURE: 112 MMHG | DIASTOLIC BLOOD PRESSURE: 67 MMHG

## 2023-12-15 VITALS
SYSTOLIC BLOOD PRESSURE: 113 MMHG | HEART RATE: 85 BPM | TEMPERATURE: 98 F | OXYGEN SATURATION: 96 % | RESPIRATION RATE: 18 BRPM | DIASTOLIC BLOOD PRESSURE: 62 MMHG

## 2023-12-15 DIAGNOSIS — F32.A DEPRESSION, UNSPECIFIED DEPRESSION TYPE: Primary | ICD-10-CM

## 2023-12-15 LAB
EKG ATRIAL RATE: 70 BPM
EKG P AXIS: 74 DEGREES
EKG P-R INTERVAL: 146 MS
EKG Q-T INTERVAL: 348 MS
EKG QRS DURATION: 84 MS
EKG QTC CALCULATION (BAZETT): 375 MS
EKG R AXIS: 65 DEGREES
EKG T AXIS: 43 DEGREES
EKG VENTRICULAR RATE: 70 BPM

## 2023-12-15 PROCEDURE — 99283 EMERGENCY DEPT VISIT LOW MDM: CPT

## 2023-12-15 PROCEDURE — 93010 ELECTROCARDIOGRAM REPORT: CPT | Performed by: INTERNAL MEDICINE

## 2023-12-15 RX ORDER — HYDROXYZINE PAMOATE 25 MG/1
25 CAPSULE ORAL 3 TIMES DAILY PRN
Qty: 42 CAPSULE | Refills: 0 | Status: SHIPPED | OUTPATIENT
Start: 2023-12-15 | End: 2023-12-29

## 2023-12-15 ASSESSMENT — PAIN - FUNCTIONAL ASSESSMENT: PAIN_FUNCTIONAL_ASSESSMENT: NONE - DENIES PAIN

## 2023-12-15 NOTE — ED NOTES
Peer Support came to talk to pt. Pt continues to talk about wanting food. Pt says he is not suicidal, not homicidal, no AVH. Continues to wait for taxi.       [facility]  12/15/23 5646

## 2023-12-15 NOTE — ED NOTES
SW contacted [facility] (on [facility]), spoke to Andi Sexton who said packet can be faxed and will be reviewed later in the AM for consideration. Andi Sexton was made aware that patient does not have medications with him again.      Reyes De Luna, [address] [place] [address]  12/15/23 3862

## 2023-12-15 NOTE — ED NOTES
PT REPORTING THAT HE WANTS TO LEAVE. HE IS NOT PINK SLIPPED. HE SAID THAT HE CAME TO THE ER TO GET DEPRESSION MEDICATIONS. HE ADAMANTLY DENIES SI, NO HI AND NO HALLUCINATIONS. HE WANTS \"RESIDENTIAL\" SERVICES, BUT HE SAID THAT HE IS ONLY USING [employer]. PEER CONTACTED. LATER PT CHANGED HIS MIND AND SAID HE RATHER LEAVE. PT THEN ASKED FOR \"SOMETHING TO HELP WITH DEPRESSION AND ANXIETY\". DR. TSANG [facility] ALERTED AND AGREED. PT THEN COMPLAINED ABOUT THAT ALSO - DICTATING THAT HE DOESN'T WANT ANY \"WEAK ASS SHIT\". PT IS ASKING FOR A TAXI TO [facility] - SAID THAT HE LIVES THERE WITH LIVES THERE WITH GRANDMA. PT HAS NO MONEY - NO INSURANCE AND THE BUS WILL NOT TAKE HIM THAT FAR.                                     Elsa Ybarra, MURRAY  12/15/23 4900

## 2023-12-15 NOTE — DISCHARGE INSTRUCTIONS
[address]   Address: [address] [place] [address], [address] [place] [address]   Phone: [phone]  Fax: [phone]    Crisis worker available for [facility] residents   Monday through Friday 8am to [address] [facility]  Location: [facility] Lalita Thayer, Andria Aranda, [address] [facility]  Phone number: [phone]  Fax number: [phone]      Help Hotline 425 317-4480 or 2-882.759.1120 or 211   24 hour crisis line for the following counties  [place]  [facility]    PEER WARM LINE: 6-972.986.6890  Monday through Friday 4pm to 8pm and Saturday 1pm-3pm   You can call during these times to talk with a peer support person as needed

## 2023-12-15 NOTE — ED NOTES
Pt referred to [facility], [address], [place]  12/15/23 1513

## 2023-12-15 NOTE — DISCHARGE INSTRUCTIONS
Go directly to [facility]  [address] [facility], Andria Aranda, [facility]  [phone]    [facility]   [address] [facility], Andria Aranda, [address] [facility]  [phone]  [facility]: [phone]     [facility]  [address] [place] [address] Corey Figueroa  [phone]

## 2023-12-15 NOTE — ED NOTES
Assumed care of patient at this time; awaiting social work consult; will monitor     Carmen Silverio  12/14/23 7171

## 2023-12-15 NOTE — ED NOTES
[facility] and expressed that they have no problem accepting this pt once again. However, recently pt has been coming to [facility], and within the 1st 2 hours of being there, he leaves, utilizing resources and taking time away from other clients getting his intake completed. I spoke to pt Pt and expressed the importance of his participation with [facility], advising that he is at risk of not getting accepted back, if he goes there and leaves. Pt expressed that he understood and plans to go and stay there to get help. I alerted Fernando Peterson. Arranged transport through [facility].              MURRAY Kowalski  12/15/23 1977

## 2023-12-15 NOTE — ED NOTES
I REACHED OUT TO MY SUPERVISOR TO DISCUSS POSSIBLE TAXI TO HIS HOME IN [place], [place]  12/15/23 1611       MURRAY Rondon  12/15/23 1614

## 2023-12-15 NOTE — ED NOTES
essential needs  Poor communication skills  Probation  Hx of Multiple psych admits    Protective Factors:  Help-seeking behavior  Denied hx of SI  No access to weapons  Goals and hope for the future    Suicidal Ideations:  [] Reports:    [] Past [] Present   [x] Denies    Suicide Attempts:  [] Reports:   [x] Denies    C-SSRS Screening Completed by RN: Current Suicide Risk:  [x] No Risk [] Low [] Moderate [] High    Homicidal Ideations:  [] Reports:   [] Past [] Present   [x] Denies     Self Injurious/Self Mutilation Behaviors:  [] Reports:    [] Past [] Present   [x] Denies    Hallucinations/Delusions:  [] Reports:   [x] Denies     Substance Use/Alcohol Use/Addiction:  [] Reports:   [x] Denies   [x] SBIRT Screen Complete. Current or Past Substance Abuse Treatment:  [] Yes, When and Where:  [x] No    Current or Past Mental Health Treatment:  [x] Yes, When and Where: pending with [facility]. [facility] of multiple [facility] admissions. [] No    Legal Issues:  [x]  Yes (Specify) PO is \"[facility],\" misdemeanor history (false identity charge, criminal trespassing)  []  No    Access to Weapons:  []  Yes (Specify)  [x]  No    Trauma History:  [] Reports:  [x] Denies     Living Situation: Said he lives with brother    Employment: Unemployed    Education Level:11th grade    Violence Risk Screening:        Have you ever thought about hurting someone? [x]  No  []  Yes (Ask the questions listed below)   When? Did you follow through with the thoughts? [] No     [] Yes- When and what happened? 2.  Have you ever threatened anyone? [x]  No  []  Yes (Ask the questions listed below)   When and what happened? Have you ever threatened someone with a gun, knife or other weapon? []  No  []  Yes - When and what happened? 2. Have you ever had an order of protection taken out against you? []  Yes [x]  No  3. Have you ever been arrested due to violence? []  Yes [x]  No  4. Have you ever been cruel to animals?  []  Yes [x] No    After consideration of C-SSRS screening results, C-SSRS assessments, and this professional's assessment the patient's overall suicide risk assessed to be:  [x] No Risk  [] Low   [] Moderate   [] High     [x] Discussed current suicide risk, protective and risk factors with RN and ED Physician. Consulted with ED Physician. Disposition/level of care recommended at this time:  [] Home:   [] Outpatient Provider:   [x] Crisis Unit:   [] Inpatient Psychiatric Unit:  [] Other:     Patient is requesting [facility] admission. SW to submit for consideration.                 Bill Carcamo, [address] [facility]  12/15/23 1985

## 2023-12-22 ENCOUNTER — HOSPITAL ENCOUNTER (EMERGENCY)
Age: 18
Discharge: HOME | DRG: 753 | End: 2023-12-22
Payer: COMMERCIAL

## 2023-12-22 ENCOUNTER — HOSPITAL ENCOUNTER (INPATIENT)
Age: 18
LOS: 3 days | Discharge: HOME | DRG: 753 | End: 2023-12-26
Attending: EMERGENCY MEDICINE | Admitting: PSYCHIATRY & NEUROLOGY
Payer: COMMERCIAL

## 2023-12-22 VITALS
OXYGEN SATURATION: 99 % | DIASTOLIC BLOOD PRESSURE: 96 MMHG | HEART RATE: 96 BPM | SYSTOLIC BLOOD PRESSURE: 140 MMHG | TEMPERATURE: 98.1 F | RESPIRATION RATE: 19 BRPM

## 2023-12-22 DIAGNOSIS — Z00.00 ADULT GENERAL MEDICAL EXAM: Primary | ICD-10-CM

## 2023-12-22 DIAGNOSIS — R45.851 DEPRESSION WITH SUICIDAL IDEATION: Primary | ICD-10-CM

## 2023-12-22 DIAGNOSIS — F32.A DEPRESSION WITH SUICIDAL IDEATION: Primary | ICD-10-CM

## 2023-12-22 DIAGNOSIS — R45.850 HOMICIDAL IDEATION: ICD-10-CM

## 2023-12-22 LAB
ALBUMIN SERPL-MCNC: 4.4 G/DL (ref 3.5–5.2)
ALP SERPL-CCNC: 71 U/L (ref 40–129)
ALT SERPL-CCNC: 19 U/L (ref 0–40)
AMPHET UR QL SCN: NEGATIVE
ANION GAP SERPL CALCULATED.3IONS-SCNC: 13 MMOL/L (ref 7–16)
APAP SERPL-MCNC: <5 UG/ML (ref 10–30)
AST SERPL-CCNC: 23 U/L (ref 0–39)
BARBITURATES UR QL SCN: NEGATIVE
BASOPHILS # BLD: 0.06 K/UL (ref 0–0.2)
BASOPHILS NFR BLD: 1 % (ref 0–2)
BENZODIAZ UR QL: NEGATIVE
BILIRUB SERPL-MCNC: 0.2 MG/DL (ref 0–1.2)
BILIRUB UR QL STRIP: NEGATIVE
BUN SERPL-MCNC: 12 MG/DL (ref 6–20)
BUPRENORPHINE UR QL: NEGATIVE
CALCIUM SERPL-MCNC: 9.4 MG/DL (ref 8.6–10.2)
CANNABINOIDS UR QL SCN: NEGATIVE
CHLORIDE SERPL-SCNC: 100 MMOL/L (ref 98–107)
CLARITY UR: CLEAR
CO2 SERPL-SCNC: 26 MMOL/L (ref 22–29)
COCAINE UR QL SCN: NEGATIVE
COLOR UR: YELLOW
COMMENT: NORMAL
CREAT SERPL-MCNC: 0.8 MG/DL (ref 0.7–1.2)
EOSINOPHIL # BLD: 1.09 K/UL (ref 0.05–0.5)
EOSINOPHILS RELATIVE PERCENT: 11 % (ref 0–6)
ERYTHROCYTE [DISTWIDTH] IN BLOOD BY AUTOMATED COUNT: 12.7 % (ref 11.5–15)
ETHANOLAMINE SERPL-MCNC: <10 MG/DL
FENTANYL UR QL: NEGATIVE
GFR SERPL CREATININE-BSD FRML MDRD: >60 ML/MIN/1.73M2
GLUCOSE SERPL-MCNC: 85 MG/DL (ref 74–99)
GLUCOSE UR STRIP-MCNC: NEGATIVE MG/DL
HCT VFR BLD AUTO: 46.8 % (ref 37–54)
HGB BLD-MCNC: 15.5 G/DL (ref 12.5–16.5)
HGB UR QL STRIP.AUTO: NEGATIVE
IMM GRANULOCYTES # BLD AUTO: 0.06 K/UL (ref 0–0.58)
IMM GRANULOCYTES NFR BLD: 1 % (ref 0–5)
KETONES UR STRIP-MCNC: NEGATIVE MG/DL
LEUKOCYTE ESTERASE UR QL STRIP: NEGATIVE
LYMPHOCYTES NFR BLD: 3.38 K/UL (ref 1.5–4)
LYMPHOCYTES RELATIVE PERCENT: 34 % (ref 20–42)
MCH RBC QN AUTO: 29.1 PG (ref 26–35)
MCHC RBC AUTO-ENTMCNC: 33.1 G/DL (ref 32–34.5)
MCV RBC AUTO: 87.8 FL (ref 80–99.9)
METHADONE UR QL: NEGATIVE
MONOCYTES NFR BLD: 0.73 K/UL (ref 0.1–0.95)
MONOCYTES NFR BLD: 7 % (ref 2–12)
NEUTROPHILS NFR BLD: 47 % (ref 43–80)
NEUTS SEG NFR BLD: 4.69 K/UL (ref 1.8–7.3)
NITRITE UR QL STRIP: NEGATIVE
OPIATES UR QL SCN: NEGATIVE
OXYCODONE UR QL SCN: NEGATIVE
PCP UR QL SCN: NEGATIVE
PH UR STRIP: 7 [PH] (ref 5–9)
PLATELET # BLD AUTO: 205 K/UL (ref 130–450)
PMV BLD AUTO: 8.6 FL (ref 7–12)
POTASSIUM SERPL-SCNC: 3.7 MMOL/L (ref 3.5–5)
PROT SERPL-MCNC: 6.8 G/DL (ref 6.4–8.3)
PROT UR STRIP-MCNC: NEGATIVE MG/DL
RBC # BLD AUTO: 5.33 M/UL (ref 3.8–5.8)
SALICYLATES SERPL-MCNC: <0.3 MG/DL (ref 0–30)
SODIUM SERPL-SCNC: 139 MMOL/L (ref 132–146)
SP GR UR STRIP: 1.01 (ref 1–1.03)
TEST INFORMATION: NORMAL
TOXIC TRICYCLIC SC,BLOOD: NEGATIVE
UROBILINOGEN UR STRIP-ACNC: 0.2 EU/DL (ref 0–1)
WBC OTHER # BLD: 10 K/UL (ref 4.5–11.5)

## 2023-12-22 PROCEDURE — 80179 DRUG ASSAY SALICYLATE: CPT

## 2023-12-22 PROCEDURE — 81003 URINALYSIS AUTO W/O SCOPE: CPT

## 2023-12-22 PROCEDURE — 85025 COMPLETE CBC W/AUTO DIFF WBC: CPT

## 2023-12-22 PROCEDURE — 99285 EMERGENCY DEPT VISIT HI MDM: CPT

## 2023-12-22 PROCEDURE — 80164 ASSAY DIPROPYLACETIC ACD TOT: CPT

## 2023-12-22 PROCEDURE — G0480 DRUG TEST DEF 1-7 CLASSES: HCPCS

## 2023-12-22 PROCEDURE — 36415 COLL VENOUS BLD VENIPUNCTURE: CPT

## 2023-12-22 PROCEDURE — 80143 DRUG ASSAY ACETAMINOPHEN: CPT

## 2023-12-22 PROCEDURE — 99282 EMERGENCY DEPT VISIT SF MDM: CPT

## 2023-12-22 PROCEDURE — 80307 DRUG TEST PRSMV CHEM ANLYZR: CPT

## 2023-12-22 PROCEDURE — 93005 ELECTROCARDIOGRAM TRACING: CPT

## 2023-12-22 PROCEDURE — 80053 COMPREHEN METABOLIC PANEL: CPT

## 2023-12-23 PROBLEM — F31.9 BIPOLAR DISORDER (HCC): Status: ACTIVE | Noted: 2023-12-23

## 2023-12-23 LAB
DATE LAST DOSE: ABNORMAL
TME LAST DOSE: ABNORMAL H
VALPROATE SERPL-MCNC: 17 UG/ML (ref 50–100)
VANCOMYCIN DOSE: ABNORMAL MG

## 2023-12-23 PROCEDURE — 1240000000 HC EMOTIONAL WELLNESS R&B

## 2023-12-23 PROCEDURE — 6370000000 HC RX 637 (ALT 250 FOR IP): Performed by: PSYCHIATRY & NEUROLOGY

## 2023-12-23 PROCEDURE — 6370000000 HC RX 637 (ALT 250 FOR IP): Performed by: NURSE PRACTITIONER

## 2023-12-23 PROCEDURE — 90792 PSYCH DIAG EVAL W/MED SRVCS: CPT | Performed by: NURSE PRACTITIONER

## 2023-12-23 RX ORDER — NICOTINE 21 MG/24HR
1 PATCH, TRANSDERMAL 24 HOURS TRANSDERMAL DAILY
Status: DISCONTINUED | OUTPATIENT
Start: 2023-12-23 | End: 2023-12-23

## 2023-12-23 RX ORDER — OLANZAPINE 5 MG/1
5 TABLET ORAL NIGHTLY
Status: DISCONTINUED | OUTPATIENT
Start: 2023-12-23 | End: 2023-12-26 | Stop reason: HOSPADM

## 2023-12-23 RX ORDER — MAGNESIUM HYDROXIDE/ALUMINUM HYDROXICE/SIMETHICONE 120; 1200; 1200 MG/30ML; MG/30ML; MG/30ML
30 SUSPENSION ORAL PRN
Status: DISCONTINUED | OUTPATIENT
Start: 2023-12-23 | End: 2023-12-26 | Stop reason: HOSPADM

## 2023-12-23 RX ORDER — HALOPERIDOL 5 MG/ML
5 INJECTION INTRAMUSCULAR EVERY 6 HOURS PRN
Status: DISCONTINUED | OUTPATIENT
Start: 2023-12-23 | End: 2023-12-26 | Stop reason: HOSPADM

## 2023-12-23 RX ORDER — POLYETHYLENE GLYCOL 3350 17 G
2 POWDER IN PACKET (EA) ORAL
Status: DISCONTINUED | OUTPATIENT
Start: 2023-12-23 | End: 2023-12-26 | Stop reason: HOSPADM

## 2023-12-23 RX ORDER — HYDROXYZINE PAMOATE 50 MG/1
50 CAPSULE ORAL 3 TIMES DAILY PRN
Status: DISCONTINUED | OUTPATIENT
Start: 2023-12-23 | End: 2023-12-26 | Stop reason: HOSPADM

## 2023-12-23 RX ORDER — HALOPERIDOL 5 MG/1
5 TABLET ORAL EVERY 6 HOURS PRN
Status: DISCONTINUED | OUTPATIENT
Start: 2023-12-23 | End: 2023-12-26 | Stop reason: HOSPADM

## 2023-12-23 RX ORDER — ACETAMINOPHEN 325 MG/1
650 TABLET ORAL EVERY 6 HOURS PRN
Status: DISCONTINUED | OUTPATIENT
Start: 2023-12-23 | End: 2023-12-26 | Stop reason: HOSPADM

## 2023-12-23 RX ORDER — LANOLIN ALCOHOL/MO/W.PET/CERES
3 CREAM (GRAM) TOPICAL NIGHTLY PRN
Status: DISCONTINUED | OUTPATIENT
Start: 2023-12-23 | End: 2023-12-26 | Stop reason: HOSPADM

## 2023-12-23 RX ADMIN — NICOTINE POLACRILEX 2 MG: 2 LOZENGE ORAL at 10:20

## 2023-12-23 RX ADMIN — DIVALPROEX SODIUM 750 MG: 500 TABLET, DELAYED RELEASE ORAL at 09:11

## 2023-12-23 RX ADMIN — DIVALPROEX SODIUM 750 MG: 500 TABLET, DELAYED RELEASE ORAL at 20:40

## 2023-12-23 RX ADMIN — NICOTINE POLACRILEX 2 MG: 2 LOZENGE ORAL at 16:39

## 2023-12-23 RX ADMIN — OLANZAPINE 5 MG: 5 TABLET, FILM COATED ORAL at 20:41

## 2023-12-23 NOTE — ED NOTES
JUAN contacted admitting Viktoria Loredo, patient assigned to bed [phone] on [facility].

## 2023-12-23 NOTE — CARE COORDINATION
Biopsychosocial Assessment Note    Social work met with patient to complete the biopsychosocial assessment and C-SSRS. Chief Complaint: \"Don't know - whatever I said came out of anger and they put me up here. \"    Mental Status Exam: Pt is alert and oriented x4. Pt's mood is irritable, affect is flat. Pt's speech is clear, rate is normal and volume is appropriate. Pt's eye contact is poor. Pt's thought process is circumstantial, with poverty of content. Pt's insight and judgement is poor. Pt was minimizing during assessment. Pt denies SI, HI, AVH. Clinical Summary:  Pt is a 19year old  male with an extensive inpatient psychiatric history. Pt stated that he did not understand why he was admitted and then replied that he spoke out of anger. Pt was minimally answering questions during the assessment and not engaged. Pt has a previous diagnosis of bipolar 1 disorder, autism spectrum disorder, ADHD, and intermittent explosive personality disorder. He resides with his grandmother and sister in [place]. Pt dropped out of highschool in 11th grade, is on SSI and is unemployed. Pt's father left when he was [age] and his mother is in care home. Pt reports having a four year old daughter in [facility] [place]. Pt denies SI, HI, AVH. He is active with [facility] in [place] and denies having any stressors at the moment. He did report being on probation with [employer] in [place]. Pt has a pending trespassing court case. Pt denies substance abuse / use but tested positive for marijuana. He stated that he stopped taking his depakote recently as \"it wasn't working. \"  He denies feelings of depression or having any appetite or sleep issues. Pt denies current abuse issues and past trauma. Pt plans to return to his grandmother's house at discharge.        Risk Factors: mental health diagnosis - bipolar 1 disorder, limited family / friend support, trouble with the law, family mental health history, several inpatient

## 2023-12-23 NOTE — PLAN OF CARE
Patient denies SI/HI/Hallucinations. Patient in control of behaviors. No complaints noted. Will continue to monitor and will intervene with close 15 minute rounds. Problem: Anxiety  Goal: Will report anxiety at manageable levels  Description: INTERVENTIONS:  1. Administer medication as ordered  2. Teach and rehearse alternative coping skills  3. Provide emotional support with 1:1 interaction with staff  Outcome: Progressing     Problem: Depression/Self Harm  Goal: Effect of psychiatric condition will be minimized and patient will be protected from self harm  Description: INTERVENTIONS:  1. Assess impact of patient's symptoms on level of functioning, self care needs and offer support as indicated  2. Assess patient/family knowledge of depression, impact on illness and need for teaching  3. Provide emotional support, presence and reassurance  4. Assess for possible suicidal thoughts or ideation. If patient expresses suicidal thoughts or statements do not leave alone, initiate Suicide Precautions, move to a room close to the nursing station and obtain sitter  5. Initiate consults as appropriate with Mental Health Professional, Spiritual Care, Psychosocial CNS, and consider a recommendation to the LIP for a Psychiatric Consultation  Outcome: Progressing     Problem: Involuntary Admit  Goal: Will cooperate with staff recommendations and doctor's orders and will demonstrate appropriate behavior  Description: INTERVENTIONS:  1. Treat underlying conditions and offer medication as ordered  2. Educate regarding involuntary admission procedures and rules  3.  Contain excessive/inappropriate behavior per unit and hospital policies  Outcome: Progressing     Problem: Risk for Elopement  Goal: Patient will not exit the unit/facility without proper excort  Outcome: Progressing

## 2023-12-23 NOTE — GROUP NOTE
Group Therapy Note    Date: 12/23/2023  Start Time: 0940  End Time:  1010  Number of Participants: 8    Type of Group: Psychoeducation    Name: Social media tune-up    Patient's Goal: ID ways social media can affect mental health. Increased awareness of how to maintain good mental health while using social media. Notes: Patient intermittently entered and exited group. Patient minimally engaged in group discussion.     Status After Intervention:  Unchanged    Participation Level: Minimal    Participation Quality: Inattentive    Speech:  normal    Thought Process/Content: Logical    Affective Functioning: Flat    Mood:  Flat    Level of consciousness:  Inattentive    Response to Learning: Resistant    Endings: None Reported    Modes of Intervention: Education    Discipline Responsible: Psychoeducational Specialist      Signature:  Nani Wakefield

## 2023-12-23 NOTE — BH NOTE
4 Eyes Skin Assessment     NAME:  Conchita Mendez  YOB: 2005  MEDICAL RECORD NUMBER:  [id number]    The patient is being assessed for  Admission    Pt has no skin concerns att. Pt reports no skin concerns att. I agree that at least one RN has performed a thorough Head to Toe Skin Assessment on the patient. ALL assessment sites listed below have been assessed. Areas assessed by both nurses:    Head, Face, Ears and Shoulders, Back, Chest        Does the Patient have a Wound?  No noted wound(s)       Curtis Prevention initiated by RN: Yes  Wound Care Orders initiated by RN: No    Pressure Injury (Stage 3,4, Unstageable, DTI, NWPT, and Complex wounds) if present, place Wound referral order by RN under : No    New Ostomies, if present place, Ostomy referral order under : No     Nurse 1 eSignature: Electronically signed by Luis Joseph RN on 12/23/23 at 6:52 AM EST    **SHARE this note so that the co-signing nurse can place an eSignature**    Nurse 2 eSignature: Electronically signed by Bianka Farah RN on 12/23/23 at 6:59 AM EST

## 2023-12-23 NOTE — ED PROVIDER NOTES
Independent [facility]RISTOPHER Visit. [facility]  ED  Encounter Note  Admit Date/RoomTime: 2023  7:39 PM  ED Room: Baryb Morejon  NAME: Yesenia Aguilar  : 2005  MRN: [id number]  PCP: No primary care provider on file. CHIEF COMPLAINT     Lab request (Patients \"grandma wont let me in the house until I get tested for HIV. \" )    HISTORY OF PRESENT ILLNESS        Yesenia Aguilar is a 19 y.o. male who presents to the ED patient states grandmother would not let him in the house unless he is tested for HIV. Denies risk factors symptoms or medical complaint  REVIEW OF SYSTEMS     Pertinent positives and negatives are stated within HPI, all other systems reviewed and are negative. Past Medical History:  has a past medical history of ADHD (attention deficit hyperactivity disorder), Autism disorder, Bipolar 1 disorder ([address]), GERD (gastroesophageal reflux disease), Hypotonia, Intermittent explosive disorder, Microcephalic ([address]), and Schizoaffective disorder ([address]). Surgical History:  has no past surgical history on file. Social History:  reports that he has been smoking cigarettes. He has a 6.0 pack-year smoking history. He has never used smokeless tobacco. He reports that he does not currently use alcohol after a past usage of about 15.0 standard drinks of alcohol per week. He reports that he does not currently use drugs after having used the following drugs: Marijuana Philippbatsheva Garduno). Frequency: 7.00 times per week. Family History: family history is not on file.    Allergies: Strawberry  CURRENT MEDICATIONS       Previous Medications    DIVALPROEX (DEPAKOTE) 250 MG DR TABLET    Take 3 tablets by mouth 2 times daily    HYDROXYZINE PAMOATE (VISTARIL) 25 MG CAPSULE    Take 1 capsule by mouth 3 times daily as needed for Anxiety    IBUPROFEN (ADVIL;MOTRIN) 600 MG TABLET    Take 1 tablet by mouth 4 times daily as needed for Pain    OLANZAPINE (ZYPREXA) 5 MG TABLET

## 2023-12-23 NOTE — H&P
Department of Psychiatry  History and Physical - Adult     CHIEF COMPLAINT: Poverty of thought content smiling and laughing inappropriately    Patient was seen after discussing with the treatment team and reviewing the chart    CIRCUMSTANCES OF ADMISSION: Chuy Hamilton is a 57-year-old male with multiple past inpatient psychiatric hospitalizations, is well-known to these providers with a history of bipolar disorder, autism spectrum disorder, ADHD presented to the emergency department after reporting that he stopped taking his depakote, and was homicidal towards his mother's child. He was pink slipped in the emergency department for homicidal ideation. HISTORY OF PRESENT ILLNESS:      The patient is a 19 y.o. male with significant past history of bipolar disorder and autism spectrum disorder presented to the is well-known to the psychiatric providers with multiple past inpatient psychiatric hospitalizations and extensive psychiatric history with most recent hospitalization here in November 2023. Again presents to the emergency department making homicidal statements against his \"child's mother\" he was pink slipped in the emergency room for homicidal statements. In the ED his urine drug screen was negative, blood alcohol was negative he was medically cleared admitted to [address] [place] [address] psychiatric unit for further psychiatric assessment stabilization and treatment    On evaluation today the patient is up on the unit, he is walking around in no apparent distress laughing and smiling inappropriately. Interaction with patient is minimal.  He apparently presented again to the emergency department making homicidal statements against his \"mother's child\", however this is a statement the patient frequently makes, he frequently makes statements about having multiple children and multiple \"baby mommas\". He frequently presents to the emergency room reporting trauma with his baby mamma's.   Collateral obtained from

## 2023-12-23 NOTE — BH NOTE
[facility]  Admission Note     Pt arrived to unit via w/c. Pt able to stand and ambulate independently. Pt declined to choose menu items and accepted the 's special for all meals. Pt states that he came in to the hospital d/t \"Suicidal thoughts and homicidal thoughts. \" Pt denies SI, Hi and AVH att. Pt states, \"I live with my Grandma,\" and denies having lived anywhere else. Pt states that he gets over 8 hours of sleep a night and does not take medication for sleep. Pt unable to say what medications he was on, and cannot say the last time he took them. Pt states that he has a court sate for \"[facility],\" but does not know when, where, or why. Pt denies pain, and gives short, one word answers to most questions. Pt states he is \"tired\" and went to his room to rest.    Admission Type:   Admission Type: Involuntary    Reason for admission:  Reason for Admission: \"I was having suicidal thoughts, and homicidal thoughts. \"      Addictive Behavior:   Addictive Behavior  In the Past 3 Months, Have You Felt or Has Someone Told You That You Have a Problem With  : None    Medical Problems:   Past Medical History:   Diagnosis Date    ADHD (attention deficit hyperactivity disorder)     Autism disorder     Bipolar 1 disorder (HCC)     GERD (gastroesophageal reflux disease)     Hypotonia     Intermittent explosive disorder     Microcephalic ([address])     Schizoaffective disorder ([facility])        Status EXAM:  Mental Status and Behavioral Exam  Normal: No  Level of Assistance: Independent/Self  Facial Expression: Flat  Affect: Blunt  Level of Consciousness: Alert  Frequency of Checks: 4 times per hour, close  Mood:Normal: No  Mood: Anxious  Motor Activity:Normal: No  Motor Activity: Decreased  Eye Contact: Poor  Observed Behavior: Cooperative, Preoccupied  Sexual Misconduct History: Current - no  Preception: [place] to person, [place] to time, [place] to place  Attention:Normal: No  Attention: Distractible  Thought

## 2023-12-24 PROCEDURE — 6370000000 HC RX 637 (ALT 250 FOR IP): Performed by: NURSE PRACTITIONER

## 2023-12-24 PROCEDURE — 6370000000 HC RX 637 (ALT 250 FOR IP): Performed by: PSYCHIATRY & NEUROLOGY

## 2023-12-24 PROCEDURE — 1240000000 HC EMOTIONAL WELLNESS R&B

## 2023-12-24 PROCEDURE — 99232 SBSQ HOSP IP/OBS MODERATE 35: CPT | Performed by: NURSE PRACTITIONER

## 2023-12-24 RX ADMIN — NICOTINE POLACRILEX 2 MG: 2 LOZENGE ORAL at 14:18

## 2023-12-24 RX ADMIN — ACETAMINOPHEN 650 MG: 325 TABLET ORAL at 22:08

## 2023-12-24 RX ADMIN — DIVALPROEX SODIUM 750 MG: 500 TABLET, DELAYED RELEASE ORAL at 08:55

## 2023-12-24 RX ADMIN — NICOTINE POLACRILEX 2 MG: 2 LOZENGE ORAL at 18:59

## 2023-12-24 RX ADMIN — OLANZAPINE 5 MG: 5 TABLET, FILM COATED ORAL at 20:53

## 2023-12-24 RX ADMIN — DIVALPROEX SODIUM 750 MG: 500 TABLET, DELAYED RELEASE ORAL at 20:53

## 2023-12-24 NOTE — PLAN OF CARE
[facility]  Initial Interdisciplinary Treatment Plan NOTE    Review Date & Time: 12/24/2023  0900    Patient was in treatment team    Admission Type:   Admission Type: Involuntary    Reason for admission:  Reason for Admission: \"I was having suicidal thoughts, and homicidal thoughts. \"      Estimated Length of Stay Update:  5 DAYS  Estimated Discharge Date Update: 12/28/23    EDUCATION:   Learner Progress Toward Treatment Goals: Reviewed results and recommendations of this team, Reviewed group plan and strategies, Reviewed signs, symptoms and risk of self harm and violent behavior, and Reviewed goals and plan of care    Method: Group    Outcome: Verbalized understanding    PATIENT GOALS:     PLAN/TREATMENT RECOMMENDATIONS UPDATE:ENCOURAGE DAILY GOALS AND GROUPS    GOALS UPDATE:   Time frame for Short-Term Goals: TODAY    Jackeline Haskins RN

## 2023-12-24 NOTE — PLAN OF CARE
Pt up in milieu. Initiating interactions with staff and peers. Appears happy and is often smiling, laughing. Denied suicidal/homicidal ideations and hallucinations. Denied anxiety and depression. Pt wants discharged. Pt said he has had a \"better day\". Pt took a nap and then watched football on TV. Pt ate HS snack. Maintained control of his behavior. Support offered. Remains on close observation staggered q 15 min checks.

## 2023-12-24 NOTE — PLAN OF CARE
Problem: Anxiety  Goal: Will report anxiety at manageable levels  Description: INTERVENTIONS:  1. Administer medication as ordered  2. Teach and rehearse alternative coping skills  3. Provide emotional support with 1:1 interaction with staff  12/24/2023 1313 by Jan Thayer RN  Outcome: Not Progressing  12/24/2023 0521 by Yesenia Cheung RN  Outcome: Progressing     Problem: Depression/Self Harm  Goal: Effect of psychiatric condition will be minimized and patient will be protected from self harm  Description: INTERVENTIONS:  1. Assess impact of patient's symptoms on level of functioning, self care needs and offer support as indicated  2. Assess patient/family knowledge of depression, impact on illness and need for teaching  3. Provide emotional support, presence and reassurance  4. Assess for possible suicidal thoughts or ideation. If patient expresses suicidal thoughts or statements do not leave alone, initiate Suicide Precautions, move to a room close to the nursing station and obtain sitter  5. Initiate consults as appropriate with Mental Health Professional, Spiritual Care, Psychosocial CNS, and consider a recommendation to the LIP for a Psychiatric Consultation  12/24/2023 1313 by Jan Thayer RN  Outcome: Not Progressing  12/24/2023 0521 by Yesenia Cheung RN  Outcome: Progressing      Patient irritable and became focused on being discharged. He initially refused his medications but took them. He is calmer now and watching TV. Denies suicidal and homicidal thoughts. Denies hallucinations.

## 2023-12-24 NOTE — DISCHARGE INSTRUCTIONS
Follow up for Tobacco Cessation at:    [facility] Tobacco Treatment                                 Date:  Friday 12/29 at [address] [facility]. [place], [address] [place] [address]   ([address] [place] [address] B elevators to 7th floor)   Phone: [phone]   Fax: [phone]     CIRCUMSTANCES OF ADMISSION: Emely Hernández is a 78-year-old male with multiple past inpatient psychiatric hospitalizations, is well-known to these providers with a history of bipolar disorder, autism spectrum disorder, ADHD presented to the emergency department after reporting that he stopped taking his depakote, and was homicidal towards his mother's child. He was pink slipped in the emergency department for homicidal ideation. Patient Name: Emely Hernández  YOB: 2005   Medical Record Number: [id number]  Date of Admission: [unfilled]   Date of Discharge: 12/26/23    Attending Provider: [unfilled]   Discharging Provider: Davian Morrissey. Paula Koroma MD  If you have any questions and need to contact this individual, please call the unit at [phone]. [facility] provider will be available on call 24/7 and during holidays. Primary Care Provider: @PCP@    Admission Diagnosis: Bipolar disorder ([address]) [F31.9]  Homicidal ideation [R45.850]  Depression with suicidal ideation [F32. A, R45.851]      * No surgery found *    [unfilled]    Screening for Metabolic Disorders for Patients on Antipsychotic Medications  (Data obtained from the EMR)    Estimated Body Mass Index  @BMI@      Fasting Blood Glucose or Hemoglobin A1c  @BRIEFLAB(GLU,GLPOC,GLUCPOC)@    @RESULAST(LABA1C,FYX7ZKIY)@    Discharge Diagnosis: Bipolar 1 disorder, mixed    Discharge Plan/Destination: Home with [facility] (24h ago, onward)      None            To obtain results of studies pending at discharge, please contact Medical records at [phone].     Keep all follow-up appointments and take

## 2023-12-25 PROCEDURE — 6370000000 HC RX 637 (ALT 250 FOR IP): Performed by: PSYCHIATRY & NEUROLOGY

## 2023-12-25 PROCEDURE — 6370000000 HC RX 637 (ALT 250 FOR IP): Performed by: NURSE PRACTITIONER

## 2023-12-25 PROCEDURE — 99232 SBSQ HOSP IP/OBS MODERATE 35: CPT | Performed by: NURSE PRACTITIONER

## 2023-12-25 PROCEDURE — 1240000000 HC EMOTIONAL WELLNESS R&B

## 2023-12-25 RX ADMIN — DIVALPROEX SODIUM 750 MG: 500 TABLET, DELAYED RELEASE ORAL at 21:01

## 2023-12-25 RX ADMIN — MELATONIN 3 MG ORAL TABLET 3 MG: 3 TABLET ORAL at 21:01

## 2023-12-25 RX ADMIN — HYDROXYZINE PAMOATE 50 MG: 50 CAPSULE ORAL at 21:01

## 2023-12-25 RX ADMIN — OLANZAPINE 5 MG: 5 TABLET, FILM COATED ORAL at 21:01

## 2023-12-25 RX ADMIN — NICOTINE POLACRILEX 2 MG: 2 LOZENGE ORAL at 13:24

## 2023-12-25 RX ADMIN — NICOTINE POLACRILEX 2 MG: 2 LOZENGE ORAL at 21:01

## 2023-12-25 RX ADMIN — NICOTINE POLACRILEX 2 MG: 2 LOZENGE ORAL at 15:55

## 2023-12-25 RX ADMIN — DIVALPROEX SODIUM 750 MG: 500 TABLET, DELAYED RELEASE ORAL at 08:26

## 2023-12-25 NOTE — PLAN OF CARE
Problem: Anxiety  Goal: Will report anxiety at manageable levels  Description: INTERVENTIONS:  1. Administer medication as ordered  2. Teach and rehearse alternative coping skills  3. Provide emotional support with 1:1 interaction with staff  Outcome: Progressing     Problem: Depression/Self Harm  Goal: Effect of psychiatric condition will be minimized and patient will be protected from self harm  Description: INTERVENTIONS:  1. Assess impact of patient's symptoms on level of functioning, self care needs and offer support as indicated  2. Assess patient/family knowledge of depression, impact on illness and need for teaching  3. Provide emotional support, presence and reassurance  4. Assess for possible suicidal thoughts or ideation. If patient expresses suicidal thoughts or statements do not leave alone, initiate Suicide Precautions, move to a room close to the nursing station and obtain sitter  5. Initiate consults as appropriate with Mental Health Professional, Spiritual Care, Psychosocial CNS, and consider a recommendation to the LIP for a Psychiatric Consultation  Outcome: Progressing     Pleasant and cooperative. Out on unit social with staff and peers. Behavior in good control. Deniess suicidal and homicidal thoughts. Denies hallucinations.      [facility]  Day 3 Interdisciplinary Treatment Plan NOTE    Review Date & Time: 12/25/2023  0900    Patient was in treatment team    Estimated Length of Stay Update:  5 days  Estimated Discharge Date Update: 12/27/23    EDUCATION:   Learner Progress Toward Treatment Goals: Reviewed results and recommendations of this team, Reviewed group plan and strategies, Reviewed signs, symptoms and risk of self harm and violent behavior, and Reviewed goals and plan of care    Method: Group    Outcome: Verbalized understanding    PATIENT GOALS:     PLAN/TREATMENT RECOMMENDATIONS UPDATE: encourage daily goals and groups     GOALS UPDATE:   Time frame for Short-Term

## 2023-12-25 NOTE — PLAN OF CARE
Problem: Anxiety  Goal: Will report anxiety at manageable levels  Description: INTERVENTIONS:  1. Administer medication as ordered  2. Teach and rehearse alternative coping skills  3. Provide emotional support with 1:1 interaction with staff  12/24/2023 2148 by Lia Sanchez RN  Outcome: Progressing  12/24/2023 1313 by Jackeline Haskins RN  Outcome: Not Progressing     Problem: Depression/Self Harm  Goal: Effect of psychiatric condition will be minimized and patient will be protected from self harm  Description: INTERVENTIONS:  1. Assess impact of patient's symptoms on level of functioning, self care needs and offer support as indicated  2. Assess patient/family knowledge of depression, impact on illness and need for teaching  3. Provide emotional support, presence and reassurance  4. Assess for possible suicidal thoughts or ideation. If patient expresses suicidal thoughts or statements do not leave alone, initiate Suicide Precautions, move to a room close to the nursing station and obtain sitter  5.  Initiate consults as appropriate with Mental Health Professional, Spiritual Care, Psychosocial CNS, and consider a recommendation to the LIP for a Psychiatric Consultation  12/24/2023 2148 by Lia Sanchez RN  Outcome: Progressing  12/24/2023 1313 by Jackeline Haskins RN  Outcome: Not Progressing

## 2023-12-26 VITALS
TEMPERATURE: 97 F | SYSTOLIC BLOOD PRESSURE: 106 MMHG | OXYGEN SATURATION: 98 % | HEART RATE: 73 BPM | DIASTOLIC BLOOD PRESSURE: 57 MMHG | RESPIRATION RATE: 15 BRPM | WEIGHT: 148 LBS | HEIGHT: 69 IN | BODY MASS INDEX: 21.92 KG/M2

## 2023-12-26 PROCEDURE — 6370000000 HC RX 637 (ALT 250 FOR IP): Performed by: NURSE PRACTITIONER

## 2023-12-26 PROCEDURE — 99239 HOSP IP/OBS DSCHRG MGMT >30: CPT | Performed by: NURSE PRACTITIONER

## 2023-12-26 PROCEDURE — 6370000000 HC RX 637 (ALT 250 FOR IP): Performed by: PSYCHIATRY & NEUROLOGY

## 2023-12-26 RX ORDER — OLANZAPINE 5 MG/1
5 TABLET ORAL NIGHTLY
Qty: 30 TABLET | Refills: 0 | Status: SHIPPED | OUTPATIENT
Start: 2023-12-26 | End: 2024-01-25

## 2023-12-26 RX ORDER — POLYETHYLENE GLYCOL 3350 17 G
2 POWDER IN PACKET (EA) ORAL
Qty: 100 EACH | Refills: 3
Start: 2023-12-26 | End: 2024-01-25

## 2023-12-26 RX ORDER — DIVALPROEX SODIUM 250 MG/1
750 TABLET, DELAYED RELEASE ORAL 2 TIMES DAILY
Qty: 180 TABLET | Refills: 0 | Status: SHIPPED | OUTPATIENT
Start: 2023-12-26 | End: 2024-01-25

## 2023-12-26 RX ADMIN — DIVALPROEX SODIUM 750 MG: 500 TABLET, DELAYED RELEASE ORAL at 08:33

## 2023-12-26 RX ADMIN — NICOTINE POLACRILEX 2 MG: 2 LOZENGE ORAL at 14:40

## 2023-12-26 RX ADMIN — NICOTINE POLACRILEX 2 MG: 2 LOZENGE ORAL at 09:03

## 2023-12-26 NOTE — CARE COORDINATION
SW met with pt to discuss discharge. Pt states that he is hopeful and ready to discharge today. He denied SI/HI/AVH. He states that he plans to return home with his grandmother, her to transport and if she is unable to transport, then he will utilize the bus. Pt denied any questions or concerns for SW at this time. Pt is aware of his outpatient follow up and plans to attend. SW provided hospital excuse for pt. SW received call from pt grandmother Robby Rivera [phone] (NINA signed). She states that she would like to  pt if he is discharged today. She denied any concerns for pt discharge today and is glad he is on his medications. SW contacted [place]  to schedule appointments for pt. Pt has appointments 12/27 at 3:30pm with [facility] for crisis support and 1/4 at 11:20am with [facility] for medications. Both appointments are in the [facility] office.       In order to ensure appropriate transition and discharge planning is in place, the following documents have been transmitted to Vishnu as the new outpatient provider:    The d/c diagnosis was transmitted to the next care provider  The reason for hospitalization was transmitted to the next care provider  The d/c medications (dosage and indication) were transmitted to the next care provider   The continuing care plan was transmitted to the next care provider

## 2023-12-26 NOTE — BH NOTE
[facility] on 12/4/2023. Corona Luong CNP made aware.  Updated home med list.

## 2023-12-26 NOTE — PLAN OF CARE
Patient walked out of facility with staff escort. Patient insisted upon utilizing public transport (bus) to get to his Grandmother's house. Patient left with medications in hand in no acute distress. Tobacco Screening:  Practical Counseling, on admission, silvana X, if applicable and completed (first 3 are required if patient doesn't refuse):            ( ) Recognizing danger situations (included triggers and roadblocks)                    ( ) Coping skills (new ways to manage stress,relaxation techniques, changing routine, distraction)                                                           ( ) Basic information about quitting (benefits of quitting, techniques in how to quit, available resources  ( ) Referral for counseling faxed to [facility]                                                                                                                                                                   (X) Patient refused counseling  (X) Patient refused referral  (X) Patient refused prescription upon discharge  ( ) Patient has not smoked in the last 30 days              Pt discharged with followings belongings:   Clothing: Socks, Footwear, Shirt, Pants (Hoodies X2, ACE wrap, Nike Shoes)  Other Valuables: Mckenna Hernandez ([phone] in safe, no money, ID, birth certifcate, social security card)   Valuables sent home with Patient, yes. Valuables retrieved from safe, Security envelope number:  [id number] and returned to patient. Patient left department with Departure Mode: By self via Mobility at Departure: Ambulatory, discharged to Discharged to: Private Residence. Patient education on aftercare instructions: Provided  Patient verbalize understanding of AVS:  YES. Given suicide prevention handout YES. Discharged in stable condition.   Status EXAM upon discharge:  Mental Status and Behavioral Exam  Normal: No  Level of Assistance: Independent/Self  Facial Expression: Brightened  Affect: Appropriate  Level

## 2023-12-26 NOTE — PLAN OF CARE
Patient up in dayroom upon approach. Patient denied anxiety and depression. Patient remains intrussive at times but is redirectable. Patient remains d/c focused. Patient denied SI/HI. Safety Rounds done q15 minutes. Will continue to monitor.

## 2023-12-26 NOTE — CARE COORDINATION
Pt briefly came to psychotherapy group. Pt was intrusive and monopolizing during group and left group early. SW will continue to encourage group participation.

## 2023-12-26 NOTE — CARE COORDINATION
Pt approached SW and stated that he would prefer to take the bus home to his grandmother's house today. He states that he does not want SW to contact his grandmother and inform her as he plans to do this himself. SW encouraged pt to allow his grandmother to transport him, however pt denied. Pt later approached SW and informed SW that he spoke with his grandmother and she state that he is bale to take the bus for transportation. Pt reports that she did say that she will not be home around 4pm today.

## 2023-12-27 LAB
EKG ATRIAL RATE: 72 BPM
EKG P AXIS: 68 DEGREES
EKG P-R INTERVAL: 156 MS
EKG Q-T INTERVAL: 362 MS
EKG QRS DURATION: 84 MS
EKG QTC CALCULATION (BAZETT): 396 MS
EKG R AXIS: 46 DEGREES
EKG T AXIS: 45 DEGREES
EKG VENTRICULAR RATE: 72 BPM

## 2023-12-31 ENCOUNTER — HOSPITAL ENCOUNTER (EMERGENCY)
Age: 18
Discharge: HOME | End: 2023-12-31
Attending: EMERGENCY MEDICINE
Payer: COMMERCIAL

## 2023-12-31 VITALS
DIASTOLIC BLOOD PRESSURE: 65 MMHG | SYSTOLIC BLOOD PRESSURE: 117 MMHG | HEART RATE: 67 BPM | OXYGEN SATURATION: 97 % | RESPIRATION RATE: 16 BRPM | TEMPERATURE: 97.8 F

## 2023-12-31 DIAGNOSIS — M79.604 RIGHT LEG PAIN: Primary | ICD-10-CM

## 2023-12-31 PROCEDURE — 99283 EMERGENCY DEPT VISIT LOW MDM: CPT

## 2023-12-31 NOTE — ED NOTES
Pt stated he wants to sign out AMA. Dr. Yeboah made aware and discussed risks of leaving prior to complete assessment. Pt stated understanding and signed AMA form, no IV access at this time.

## 2024-01-04 ENCOUNTER — HOSPITAL ENCOUNTER (EMERGENCY)
Age: 19
Discharge: HOME | End: 2024-01-04
Attending: EMERGENCY MEDICINE
Payer: COMMERCIAL

## 2024-01-04 VITALS
SYSTOLIC BLOOD PRESSURE: 133 MMHG | TEMPERATURE: 97.3 F | RESPIRATION RATE: 18 BRPM | DIASTOLIC BLOOD PRESSURE: 74 MMHG | OXYGEN SATURATION: 97 % | HEART RATE: 108 BPM

## 2024-01-04 DIAGNOSIS — S86.811A STRAIN OF RIGHT CALF MUSCLE: Primary | ICD-10-CM

## 2024-01-04 PROCEDURE — 99283 EMERGENCY DEPT VISIT LOW MDM: CPT

## 2024-01-04 PROCEDURE — 6370000000 HC RX 637 (ALT 250 FOR IP): Performed by: EMERGENCY MEDICINE

## 2024-01-04 RX ORDER — OXYCODONE HYDROCHLORIDE AND ACETAMINOPHEN 5; 325 MG/1; MG/1
2 TABLET ORAL ONCE
Status: DISCONTINUED | OUTPATIENT
Start: 2024-01-04 | End: 2024-01-04

## 2024-01-04 RX ORDER — KETOROLAC TROMETHAMINE 30 MG/ML
30 INJECTION, SOLUTION INTRAMUSCULAR; INTRAVENOUS ONCE
Status: DISCONTINUED | OUTPATIENT
Start: 2024-01-04 | End: 2024-01-04 | Stop reason: HOSPADM

## 2024-01-04 RX ORDER — CYCLOBENZAPRINE HCL 10 MG
10 TABLET ORAL ONCE
Status: COMPLETED | OUTPATIENT
Start: 2024-01-04 | End: 2024-01-04

## 2024-01-04 RX ORDER — NAPROXEN 500 MG/1
500 TABLET ORAL 2 TIMES DAILY PRN
Qty: 60 TABLET | Refills: 0 | Status: SHIPPED | OUTPATIENT
Start: 2024-01-04 | End: 2024-01-06

## 2024-01-04 RX ADMIN — CYCLOBENZAPRINE 10 MG: 10 TABLET, FILM COATED ORAL at 19:13

## 2024-01-04 ASSESSMENT — ENCOUNTER SYMPTOMS
COUGH: 0
SHORTNESS OF BREATH: 0
DIARRHEA: 0
VOMITING: 0
BACK PAIN: 0
SORE THROAT: 0
ABDOMINAL PAIN: 0
CHEST TIGHTNESS: 0

## 2024-01-04 NOTE — ED PROVIDER NOTES
Chief complaint  Right leg    HPI history provided by the patient  The patient is here complaining of right leg pain.  He states he twisted a few days ago.  No new injuries or complaints, just states it still hurts sometimes.  Upon arrival and immediately after triage, the patient decided he would like to sign out AGAINST MEDICAL ADVICE, he barely let me get a physical exam in.  He really gives no other history other than as stated.    Review of Systems   Constitutional:  Negative for chills and fever.   HENT:  Negative for congestion and sore throat.    Respiratory:  Negative for cough, chest tightness and shortness of breath.    Cardiovascular:  Negative for chest pain, palpitations and leg swelling.   Gastrointestinal:  Negative for abdominal pain, diarrhea and vomiting.   Genitourinary:  Negative for dysuria and flank pain.   Musculoskeletal:  Negative for arthralgias, back pain, neck pain and neck stiffness.   Skin:  Negative for rash and wound.   Neurological:  Negative for syncope, weakness and headaches.   All other systems reviewed and are negative.       Physical Exam  Vitals and nursing note reviewed.   Constitutional:       General: He is awake. He is not in acute distress.     Appearance: He is well-developed. He is not ill-appearing, toxic-appearing or diaphoretic.   HENT:      Head: Normocephalic and atraumatic.      Comments: No sign of acute head or face injury  Eyes:      General: No scleral icterus.     Pupils: Pupils are equal, round, and reactive to light.   Cardiovascular:      Rate and Rhythm: Normal rate and regular rhythm.      Heart sounds: Normal heart sounds. No murmur heard.  Pulmonary:      Effort: Pulmonary effort is normal. No respiratory distress.      Breath sounds: Normal breath sounds. No stridor, decreased air movement or transmitted upper airway sounds. No decreased breath sounds, wheezing, rhonchi or rales.   Chest:      Chest wall: No tenderness.   Abdominal:      General:  ---------------------------------------------  Past Medical History:  has a past medical history of ADHD (attention deficit hyperactivity disorder), Autism disorder, Bipolar 1 disorder ([facility]), GERD (gastroesophageal reflux disease), Hypotonia, Intermittent explosive disorder, Microcephalic ([facility]), and Schizoaffective disorder ([facility]).    Past Surgical History:  has no past surgical history on file.    Social History:  reports that he has been smoking cigarettes. He has a 6.0 pack-year smoking history. He has never used smokeless tobacco. He reports that he does not currently use alcohol after a past usage of about 15.0 standard drinks of alcohol per week. He reports that he does not currently use drugs after having used the following drugs: Marijuana (Weed). Frequency: 7.00 times per week.    Family History: family history is not on file.     The patient’s home medications have been reviewed.    Allergies: Strawberry    -------------------------------------------------- RESULTS -------------------------------------------------  Labs:  No results found for this visit on 12/31/23.    Radiology:  No orders to display       ------------------------- NURSING NOTES AND VITALS REVIEWED ---------------------------  Date / Time Roomed:  12/31/2023  6:08 PM  ED Bed Assignment:  STEVEN/STEVEN    The nursing notes within the ED encounter and vital signs as below have been reviewed.   /65   Pulse 67   Temp 97.8 °F (36.6 °C)   Resp 16   SpO2 97%   Oxygen Saturation Interpretation: Normal    This patient has chosen to leave against medical advice.  I have personally explained to them that choosing to do so may result in permanent bodily harm or death.  I discussed at length that without further evaluation and monitoring there may be unforeseen circumstances and deterioration resulting in permanent bodily harm or death as a result of their choice.    They are alert, oriented, and competent at this time.  They state that they are

## 2024-01-05 ENCOUNTER — APPOINTMENT (OUTPATIENT)
Dept: ULTRASOUND IMAGING | Age: 19
End: 2024-01-05
Payer: COMMERCIAL

## 2024-01-05 ENCOUNTER — HOSPITAL ENCOUNTER (EMERGENCY)
Age: 19
Discharge: HOME | End: 2024-01-05
Payer: COMMERCIAL

## 2024-01-05 ENCOUNTER — APPOINTMENT (OUTPATIENT)
Dept: GENERAL RADIOLOGY | Age: 19
End: 2024-01-05
Payer: COMMERCIAL

## 2024-01-05 VITALS
SYSTOLIC BLOOD PRESSURE: 123 MMHG | RESPIRATION RATE: 18 BRPM | OXYGEN SATURATION: 99 % | HEART RATE: 93 BPM | TEMPERATURE: 97.9 F | DIASTOLIC BLOOD PRESSURE: 89 MMHG

## 2024-01-05 VITALS
DIASTOLIC BLOOD PRESSURE: 74 MMHG | WEIGHT: 170 LBS | OXYGEN SATURATION: 97 % | TEMPERATURE: 97.4 F | HEART RATE: 84 BPM | SYSTOLIC BLOOD PRESSURE: 125 MMHG | RESPIRATION RATE: 18 BRPM | BODY MASS INDEX: 25.1 KG/M2

## 2024-01-05 DIAGNOSIS — M79.604 RIGHT LEG PAIN: Primary | ICD-10-CM

## 2024-01-05 DIAGNOSIS — S86.812D STRAIN OF CALF MUSCLE, LEFT, SUBSEQUENT ENCOUNTER: ICD-10-CM

## 2024-01-05 PROCEDURE — 93971 EXTREMITY STUDY: CPT

## 2024-01-05 PROCEDURE — 99283 EMERGENCY DEPT VISIT LOW MDM: CPT

## 2024-01-05 PROCEDURE — 99284 EMERGENCY DEPT VISIT MOD MDM: CPT

## 2024-01-05 PROCEDURE — 73590 X-RAY EXAM OF LOWER LEG: CPT

## 2024-01-05 ASSESSMENT — PAIN DESCRIPTION - ORIENTATION: ORIENTATION: RIGHT

## 2024-01-05 ASSESSMENT — PAIN SCALES - GENERAL: PAINLEVEL_OUTOF10: 4

## 2024-01-05 ASSESSMENT — PAIN DESCRIPTION - ONSET: ONSET: ON-GOING

## 2024-01-05 ASSESSMENT — PAIN DESCRIPTION - LOCATION: LOCATION: LEG

## 2024-01-05 ASSESSMENT — PAIN DESCRIPTION - PAIN TYPE: TYPE: ACUTE PAIN

## 2024-01-05 ASSESSMENT — PAIN - FUNCTIONAL ASSESSMENT: PAIN_FUNCTIONAL_ASSESSMENT: 0-10

## 2024-01-05 ASSESSMENT — PAIN DESCRIPTION - FREQUENCY: FREQUENCY: CONTINUOUS

## 2024-01-05 ASSESSMENT — PAIN DESCRIPTION - DESCRIPTORS: DESCRIPTORS: ACHING

## 2024-01-05 NOTE — ED PROVIDER NOTES
Independent CHRISTOPHER Visit     [facility]  Department of Emergency Medicine   ED  Encounter Note  Admit Date/RoomTime: 2024  6:36 PM  ED Room: [facility]/\[facility] [place] [facility]\""    NAME: Lukas Foss  : 2005  MRN: [id number]     Chief Complaint:  Leg Pain (Right calf pain and inflammation for one day. Seen at [facility]. Worse. )    History of Present Illness   History provided by the patient.     Lukas Foss is a 18 y.o. old male who presents to the emergency department by private vehicle, for  reevaluation of right calf pain which started 6 days prior to arrival while running. At that time he twisted the leg and felt a sharp pain. No new injury or trauma. Seen in the ED on 2023 and  for the same complaint. Signed out AMA on initial visit. Was prescribed naproxen yesterday but never picked it up. Has not tried any OTC analgesics, ice, or stretching to see if any improvement.  His weight bearing status is normal.  Since onset the symptoms have been stable.  His pain is aggraveated by walking and relieved by nothing, as no treatment has been provided prior to this visit. He denies any neck pain, back pain, extremity injury, numbness, tingling, burning, weakness, fever, chills, wounds, tearing or shotgun type pain, redness, changes in color or temperature of the right leg, or rash.    ROS   Pertinent positives and negatives are stated within HPI, all other systems reviewed and are negative.    Past Medical History:  has a past medical history of ADHD (attention deficit hyperactivity disorder), Autism disorder, Bipolar 1 disorder (HCC), GERD (gastroesophageal reflux disease), Hypotonia, Intermittent explosive disorder, Microcephalic (HCC), and Schizoaffective disorder ([facility]).    Surgical History:  has no past surgical history on file.    Social History:  reports that he has been smoking cigarettes. He has a 6.0 pack-year smoking history. He has never used smokeless  is subsequently for symptom control, limited use and immobilization with outpatient follow-up. Referral provided to [facility] for follow up. He is to  the previously prescribed naproxen. RICE concepts discussed.     Plan of Care/Counseling:  NAN Navas CNP reviewed today's visit with the patient in addition to providing specific details for the plan of care and counseling regarding the diagnosis and prognosis.  Questions are answered at this time and are agreeable with the plan.  Assessment      1. Right leg pain    2. Strain of calf muscle, left, subsequent encounter      Plan   Discharged home.  Patient condition is good    New Medications     Discharge Medication List as of 1/5/2024  6:48 PM        Electronically signed by NAN Navas CNP   DD: 1/5/24  **This report was transcribed using voice recognition software. Every effort was made to ensure accuracy; however, inadvertent computerized transcription errors may be present.  END OF ED PROVIDER NOTE       Erin Grimes APRN - CNP  01/05/24 1919

## 2024-01-05 NOTE — ED PROVIDER NOTES
HPI:  1/4/24,   Time: 7:09 PM ANTHONY Foss is a 18 y.o. male presenting to the ED for right calf pain, beginning hrs ago.  The complaint has been persistent, mild in severity, and worsened by movement of calf.  Was running and felt a sharp pain in the calf.  No feel of anything tearing or shotgun type sensation.  Does have normal plantar dorsiflexion.  No numbness or tingling.  Patient difficult historian    Review of Systems:   Pertinent positives and negatives are stated within HPI, all other systems reviewed and are negative.          --------------------------------------------- PAST HISTORY ---------------------------------------------  Past Medical History:  has a past medical history of ADHD (attention deficit hyperactivity disorder), Autism disorder, Bipolar 1 disorder ([facility]), GERD (gastroesophageal reflux disease), Hypotonia, Intermittent explosive disorder, Microcephalic ([facility]), and Schizoaffective disorder ([facility]).    Past Surgical History:  has no past surgical history on file.    Social History:  reports that he has been smoking cigarettes. He has a 6.0 pack-year smoking history. He has never used smokeless tobacco. He reports that he does not currently use alcohol after a past usage of about 15.0 standard drinks of alcohol per week. He reports that he does not currently use drugs after having used the following drugs: Marijuana (Weed). Frequency: 7.00 times per week.    Family History: family history is not on file.     The patient’s home medications have been reviewed.    Allergies: Strawberry        ---------------------------------------------------PHYSICAL EXAM--------------------------------------    Constitutional/General: Alert and oriented x3, well appearing, non toxic in NAD  Head: Normocephalic and atraumatic  Eyes: PERRL, EOMI, conjunctive normal, sclera non icteric  Mouth: Oropharynx clear, handling secretions, no trismus, no asymmetry of the posterior oropharynx or uvular

## 2024-01-06 ENCOUNTER — HOSPITAL ENCOUNTER (EMERGENCY)
Age: 19
Discharge: HOME | End: 2024-01-06
Attending: EMERGENCY MEDICINE
Payer: COMMERCIAL

## 2024-01-06 DIAGNOSIS — M79.604 RIGHT LEG PAIN: Primary | ICD-10-CM

## 2024-01-06 PROCEDURE — 6370000000 HC RX 637 (ALT 250 FOR IP): Performed by: NURSE PRACTITIONER

## 2024-01-06 RX ORDER — IBUPROFEN 800 MG/1
800 TABLET ORAL ONCE
Status: COMPLETED | OUTPATIENT
Start: 2024-01-06 | End: 2024-01-06

## 2024-01-06 RX ORDER — IBUPROFEN 800 MG/1
800 TABLET ORAL 2 TIMES DAILY PRN
Qty: 10 TABLET | Refills: 0 | Status: SHIPPED | OUTPATIENT
Start: 2024-01-06

## 2024-01-06 RX ADMIN — IBUPROFEN 800 MG: 800 TABLET, FILM COATED ORAL at 00:49

## 2024-01-06 ASSESSMENT — PAIN DESCRIPTION - LOCATION: LOCATION: BACK

## 2024-01-06 ASSESSMENT — PAIN SCALES - GENERAL: PAINLEVEL_OUTOF10: 8

## 2024-01-06 NOTE — ED NOTES
Patient given discharge paperwork and verbalized understanding of discharge instructions, medications, and follow ups from today's visit.

## 2024-01-08 ENCOUNTER — HOSPITAL ENCOUNTER (EMERGENCY)
Age: 19
Discharge: HOME | End: 2024-01-08

## 2024-01-08 VITALS — OXYGEN SATURATION: 93 % | RESPIRATION RATE: 18 BRPM | TEMPERATURE: 97.8 F | HEART RATE: 96 BPM

## 2024-01-11 ENCOUNTER — HOSPITAL ENCOUNTER (EMERGENCY)
Age: 19
Discharge: HOME | End: 2024-01-11
Payer: COMMERCIAL

## 2024-01-11 ENCOUNTER — APPOINTMENT (OUTPATIENT)
Dept: GENERAL RADIOLOGY | Age: 19
End: 2024-01-11
Payer: COMMERCIAL

## 2024-01-11 VITALS
RESPIRATION RATE: 18 BRPM | OXYGEN SATURATION: 99 % | SYSTOLIC BLOOD PRESSURE: 129 MMHG | DIASTOLIC BLOOD PRESSURE: 86 MMHG | TEMPERATURE: 97.5 F | HEART RATE: 88 BPM

## 2024-01-11 DIAGNOSIS — S20.212A CONTUSION OF RIB ON LEFT SIDE, INITIAL ENCOUNTER: Primary | ICD-10-CM

## 2024-01-11 PROCEDURE — 71101 X-RAY EXAM UNILAT RIBS/CHEST: CPT

## 2024-01-11 PROCEDURE — 6370000000 HC RX 637 (ALT 250 FOR IP): Performed by: NURSE PRACTITIONER

## 2024-01-11 PROCEDURE — 99283 EMERGENCY DEPT VISIT LOW MDM: CPT

## 2024-01-11 RX ORDER — IBUPROFEN 400 MG/1
600 TABLET ORAL ONCE
Status: COMPLETED | OUTPATIENT
Start: 2024-01-11 | End: 2024-01-11

## 2024-01-11 RX ADMIN — IBUPROFEN 600 MG: 400 TABLET, FILM COATED ORAL at 18:46

## 2024-01-11 NOTE — ED PROVIDER NOTES
Independent CHRISTOPHER Visit.       [facility] EMERGENCY DEPARTMENT  ED  Encounter Note  Admit Date/RoomTime: 2024  8:52 PM  ED Room: [place] [address]  NAME: Lukas Foss  : 2005  MRN: [id number]  PCP: No primary care provider on file.    CHIEF COMPLAINT     Rib Pain (injury) (Patient states he ran into the wall last night having lt side rib pain)    HISTORY OF PRESENT ILLNESS        Lukas Foss is a 18 y.o. male who presents to the ED with compliant of left rib pain.  States he ran into the wall last night and struck left ribs on corner.  Sore to touch.  No shortness of breath.  No cough.    REVIEW OF SYSTEMS     Pertinent positives and negatives are stated within HPI, all other systems reviewed and are negative.    Past Medical History:  has a past medical history of ADHD (attention deficit hyperactivity disorder), Autism disorder, Bipolar 1 disorder (HCC), GERD (gastroesophageal reflux disease), Hypotonia, Intermittent explosive disorder, Microcephalic (HCC), and Schizoaffective disorder (HCC).  Surgical History:  has no past surgical history on file.  Social History:  reports that he has been smoking cigarettes. He has a 6.0 pack-year smoking history. He has never used smokeless tobacco. He reports that he does not currently use alcohol after a past usage of about 15.0 standard drinks of alcohol per week. He reports that he does not currently use drugs after having used the following drugs: Marijuana (Weed). Frequency: 7.00 times per week.  Family History: family history is not on file.   Allergies: Strawberry  CURRENT MEDICATIONS       Previous Medications    DIVALPROEX (DEPAKOTE) 250 MG DR TABLET    Take 3 tablets by mouth 2 times daily    IBUPROFEN (ADVIL;MOTRIN) 800 MG TABLET    Take 1 tablet by mouth 2 times daily as needed for Pain    NICOTINE POLACRILEX (COMMIT) 2 MG LOZENGE    Take 1 lozenge by mouth every 2 hours as needed for Smoking cessation

## 2024-01-14 ENCOUNTER — APPOINTMENT (OUTPATIENT)
Dept: CT IMAGING | Age: 19
End: 2024-01-14
Payer: COMMERCIAL

## 2024-01-14 ENCOUNTER — HOSPITAL ENCOUNTER (EMERGENCY)
Age: 19
Discharge: HOME | End: 2024-01-14
Payer: COMMERCIAL

## 2024-01-14 VITALS
TEMPERATURE: 98 F | DIASTOLIC BLOOD PRESSURE: 78 MMHG | RESPIRATION RATE: 16 BRPM | HEART RATE: 84 BPM | HEIGHT: 69 IN | WEIGHT: 170 LBS | BODY MASS INDEX: 25.18 KG/M2 | SYSTOLIC BLOOD PRESSURE: 122 MMHG | OXYGEN SATURATION: 97 %

## 2024-01-14 DIAGNOSIS — S09.90XA CLOSED HEAD INJURY, INITIAL ENCOUNTER: Primary | ICD-10-CM

## 2024-01-14 PROCEDURE — 70450 CT HEAD/BRAIN W/O DYE: CPT

## 2024-01-14 PROCEDURE — 99284 EMERGENCY DEPT VISIT MOD MDM: CPT

## 2024-01-14 PROCEDURE — 70486 CT MAXILLOFACIAL W/O DYE: CPT

## 2024-01-14 RX ORDER — ACETAMINOPHEN 325 MG/1
650 TABLET ORAL EVERY 6 HOURS PRN
Qty: 40 TABLET | Refills: 0 | Status: SHIPPED | OUTPATIENT
Start: 2024-01-14 | End: 2024-01-19

## 2024-01-14 NOTE — ED PROVIDER NOTES
Independent CHRISTOPHER Visit.     Department of Emergency Medicine   ED  Encounter Note  Admit Date/RoomTime: 2024  7:16 PM  ED Room: [place] [address]    NAME: Lukas Foss  : 2005  MRN: [id number]     Chief Complaint:  Facial Injury (Pt states he ran into a wall last night and states his nose is broken. Denies any other pain anywhere else. Pt seen at [facility] this AM for same complaint )    History of Present Illness        Lukas Foss is a 18 y.o. old male who presents to the emergency department by private vehicle, for traumatic nose pain which occured 1 day(s) prior to arrival.  Mechanism of pain/injury: accidentally walked into a wall while he was in his \"girl's house\" last night when the lights were out.   Loss of consciousness: No.  Previous facial injury: no.  Since onset the symptoms have been remaining constant.  His pain is aggraveated by palpation and relieved by nothing. There has been no confusion, diaphoresis, fever, headache, chest pain, palpitations, dizziness, blurred vision, diplopia, loss of vision, slurred speech, focal weakness, focal sensory loss, clumsiness, nausea, vomiting, neck pain, incontinence, or seizure activity. He takes no blood thinning agents.    ROS   Pertinent positives and negatives are stated within HPI, all other systems reviewed and are negative.    Past Medical History:  has a past medical history of ADHD (attention deficit hyperactivity disorder), Autism disorder, Bipolar 1 disorder (HCC), GERD (gastroesophageal reflux disease), Hypotonia, Intermittent explosive disorder, Microcephalic (HCC), and Schizoaffective disorder ([facility]).    Surgical History:  has no past surgical history on file.    Social History:  reports that he has quit smoking. His smoking use included cigarettes. He has a 6.0 pack-year smoking history. He has never used smokeless tobacco. He reports that he does not currently use alcohol after a past usage of about 15.0 standard drinks of

## 2024-01-15 ENCOUNTER — HOSPITAL ENCOUNTER (EMERGENCY)
Age: 19
Discharge: LEFT BEFORE DISCHARGE | End: 2024-01-15
Payer: COMMERCIAL

## 2024-01-15 ENCOUNTER — APPOINTMENT (OUTPATIENT)
Dept: GENERAL RADIOLOGY | Age: 19
End: 2024-01-15
Payer: COMMERCIAL

## 2024-01-15 VITALS
WEIGHT: 170 LBS | DIASTOLIC BLOOD PRESSURE: 70 MMHG | BODY MASS INDEX: 25.18 KG/M2 | HEIGHT: 69 IN | OXYGEN SATURATION: 100 % | TEMPERATURE: 97.4 F | SYSTOLIC BLOOD PRESSURE: 123 MMHG | HEART RATE: 92 BPM | RESPIRATION RATE: 16 BRPM

## 2024-01-15 DIAGNOSIS — Z53.29 LEFT AGAINST MEDICAL ADVICE: ICD-10-CM

## 2024-01-15 DIAGNOSIS — M79.604 RIGHT LEG PAIN: Primary | ICD-10-CM

## 2024-01-15 PROCEDURE — 99283 EMERGENCY DEPT VISIT LOW MDM: CPT

## 2024-01-15 PROCEDURE — 73562 X-RAY EXAM OF KNEE 3: CPT

## 2024-01-15 PROCEDURE — 6370000000 HC RX 637 (ALT 250 FOR IP): Performed by: NURSE PRACTITIONER

## 2024-01-15 RX ORDER — IBUPROFEN 600 MG/1
600 TABLET ORAL ONCE
Status: COMPLETED | OUTPATIENT
Start: 2024-01-15 | End: 2024-01-15

## 2024-01-15 RX ADMIN — IBUPROFEN 600 MG: 600 TABLET, FILM COATED ORAL at 19:29

## 2024-01-15 ASSESSMENT — PAIN DESCRIPTION - DESCRIPTORS
DESCRIPTORS: ACHING
DESCRIPTORS: SHARP

## 2024-01-15 ASSESSMENT — PAIN DESCRIPTION - LOCATION
LOCATION: KNEE
LOCATION: KNEE

## 2024-01-15 ASSESSMENT — PAIN DESCRIPTION - ORIENTATION
ORIENTATION: RIGHT
ORIENTATION: RIGHT

## 2024-01-15 ASSESSMENT — PAIN - FUNCTIONAL ASSESSMENT: PAIN_FUNCTIONAL_ASSESSMENT: 0-10

## 2024-01-15 ASSESSMENT — PAIN SCALES - GENERAL
PAINLEVEL_OUTOF10: 5
PAINLEVEL_OUTOF10: 10

## 2024-01-15 NOTE — ED PROVIDER NOTES
Independent CHRISTOPHER Visit.            [facility] EMERGENCY DEPARTMENT  ED  Encounter Note  Admit Date/RoomTime: 1/15/2024  7:07 PM  ED Room: STEVEN/STEVEN  NAME: Lukas Foss  : 2005  MRN: [id number]  PCP: No primary care provider on file.    CHIEF COMPLAINT     Leg Pain (Right leg gave when walking out )    HISTORY OF PRESENT ILLNESS        Lukas Foss is a 18 y.o. male who presents to the ED by ambulance  for right knee pain and states he twisted the right knee while walking today and the knee gave out and denies any fall, beginning few hours prior to arrival. The complaint has been persistent and gradually worsening and are mild in severity.  He is ambulatory with a steady gait with no assistive devices. He did not take any medications prior to arrival.  He denies any fever, chills, swelling to the knee, redness, calf pain, leg swelling, chest pain, shortness of breath, dizziness or any other symptoms.    REVIEW OF SYSTEMS     Pertinent positives and negatives are stated within HPI, all other systems reviewed and are negative.    Past Medical History:  has a past medical history of ADHD (attention deficit hyperactivity disorder), Autism disorder, Bipolar 1 disorder (HCC), GERD (gastroesophageal reflux disease), Hypotonia, Intermittent explosive disorder, Microcephalic (HCC), and Schizoaffective disorder (HCC).  Surgical History:  has no past surgical history on file.  Social History:  reports that he has quit smoking. His smoking use included cigarettes. He has a 6.0 pack-year smoking history. He has never used smokeless tobacco. He reports that he does not currently use alcohol after a past usage of about 15.0 standard drinks of alcohol per week. He reports that he does not currently use drugs after having used the following drugs: Marijuana (Weed). Frequency: 7.00 times per week.  Family History: family history is not on file.   Allergies: Strawberry  CURRENT

## 2024-01-16 ENCOUNTER — HOSPITAL ENCOUNTER (EMERGENCY)
Age: 19
Discharge: HOME | End: 2024-01-16
Payer: COMMERCIAL

## 2024-01-16 ENCOUNTER — APPOINTMENT (OUTPATIENT)
Dept: GENERAL RADIOLOGY | Age: 19
End: 2024-01-16
Payer: COMMERCIAL

## 2024-01-16 VITALS
SYSTOLIC BLOOD PRESSURE: 132 MMHG | DIASTOLIC BLOOD PRESSURE: 82 MMHG | TEMPERATURE: 98.6 F | RESPIRATION RATE: 18 BRPM | OXYGEN SATURATION: 99 % | HEART RATE: 88 BPM

## 2024-01-16 VITALS
DIASTOLIC BLOOD PRESSURE: 81 MMHG | BODY MASS INDEX: 25.18 KG/M2 | OXYGEN SATURATION: 100 % | TEMPERATURE: 98.1 F | HEIGHT: 69 IN | SYSTOLIC BLOOD PRESSURE: 145 MMHG | WEIGHT: 170 LBS | HEART RATE: 74 BPM | RESPIRATION RATE: 16 BRPM

## 2024-01-16 DIAGNOSIS — S00.33XA CONTUSION OF NOSE, INITIAL ENCOUNTER: Primary | ICD-10-CM

## 2024-01-16 DIAGNOSIS — S93.401A SPRAIN OF RIGHT ANKLE, UNSPECIFIED LIGAMENT, INITIAL ENCOUNTER: Primary | ICD-10-CM

## 2024-01-16 PROCEDURE — 99283 EMERGENCY DEPT VISIT LOW MDM: CPT

## 2024-01-16 PROCEDURE — 99282 EMERGENCY DEPT VISIT SF MDM: CPT

## 2024-01-16 PROCEDURE — 73610 X-RAY EXAM OF ANKLE: CPT

## 2024-01-16 ASSESSMENT — PAIN - FUNCTIONAL ASSESSMENT: PAIN_FUNCTIONAL_ASSESSMENT: 0-10

## 2024-01-16 ASSESSMENT — PAIN SCALES - GENERAL: PAINLEVEL_OUTOF10: 8

## 2024-01-16 ASSESSMENT — PAIN DESCRIPTION - LOCATION: LOCATION: FACE

## 2024-01-16 NOTE — ED TRIAGE NOTES
FIRST PROVIDER CONTACT ASSESSMENT NOTE      Department of Emergency Medicine   Admit Date: No admission date for patient encounter.    Chief Complaint: Facial Pain (Thinks nose is broke, happened 2 days ago. ) and Headache      History of Present Illness:    Lukas Foss is a 18 y.o. male who presents to the ED for   Nasal pain  Hit in nose 2 days ago, heavy bleeding on day 1  Now has headaches    Already had ct head and facial bones on 1/14- negative  Not taking anything for pain          -----------------END OF FIRST PROVIDER CONTACT ASSESSMENT NOTE--------------  Electronically signed by LISA Damon   DD: 1/16/24

## 2024-01-16 NOTE — ED PROVIDER NOTES
Independent CHRISTOPHER Visit.      [facility]  Department of Emergency Medicine   ED  Encounter Note  Admit Date/RoomTime: No admission date for patient encounter.  ED Room: Room/bed info not found    NAME: Lukas Foss  : 2005  MRN: [id number]     Chief Complaint:  Facial Pain (Thinks nose is broke, happened 2 days ago. ) and Headache    History of Present Illness        Lukas Foss is a 18 y.o. old male who presents to the emergency department by private vehicle, for persistent nasal pain since being evaluated on 2024.  Patient's initial accident was from striking his nose off of a wall while walking when the lights were out.  He was evaluated here, had normal CT scans, was discharged home.  He states his nose continues to hurt.  He has had no episodes of epistaxis.  He states he has intermittent headaches.  He denies nausea, vomiting, chest pain, shortness of breath, dizziness.    ROS   Pertinent positives and negatives are stated within HPI, all other systems reviewed and are negative.    Past Medical History:  has a past medical history of ADHD (attention deficit hyperactivity disorder), Autism disorder, Bipolar 1 disorder (HCC), GERD (gastroesophageal reflux disease), Hypotonia, Intermittent explosive disorder, Microcephalic (HCC), and Schizoaffective disorder ([facility]).    Surgical History:  has no past surgical history on file.    Social History:  reports that he has quit smoking. His smoking use included cigarettes. He has a 6.0 pack-year smoking history. He has never used smokeless tobacco. He reports that he does not currently use alcohol after a past usage of about 15.0 standard drinks of alcohol per week. He reports that he does not currently use drugs after having used the following drugs: Marijuana (Weed). Frequency: 7.00 times per week.    Family History: family history is not on file.     Allergies: Strawberry    Physical Exam   Oxygen Saturation Interpretation:

## 2024-01-16 NOTE — ED NOTES
Discharge instructions given. Patient verbalizes understanding. No other noted or stated problems at this time. Patient will follow up with primary care.

## 2024-01-16 NOTE — CARE COORDINATION
Social Work/Transition of Care:    SW consulted to meet with pt, upon receiving discharge papers pt requested to speak with SW.  Patient reports he lives in [place] and has called [facility] and they are unable to provide transport  home.    Pt reports he has no funds to pay for a ticket, pt agreeable to go to the [facility] until he is able to contact family and/or friends in order to get a ticket to [place].  Due to the current weather conditions SW spoke with CM Supervisor in order to obtain a Taxi Voucher, SW provided pt with a copy of his picture ID.    Electronically signed by MURRAY farr on 1/16/2024 at 5:30 PM       Prior to pt being picked up by the Taxi pt reported his friend is helping him purchase a bus ticket pt provided SW with a [employer] Bus Voucher his friend sent him for departure tomorrow    Electronically signed by MURRAY farr on 1/16/2024 at 5:40 PM

## 2024-01-16 NOTE — DISCHARGE INSTRUCTIONS
Leaving Against Medical Advice    Discharge against medical advice means that you choose to leave the hospital before your caregiver recommends that you do. Your caregiver may still need to diagnose or treat your condition or your treatment may not be complete.    INSTRUCTIONS: contact your doctor or the provider assigned on your instructions as soon as possible to arrange follow-up.     Risks:  Risks of leaving the hospital before your caregivers recommend include the following:        Your condition may cause other health problems if not treated properly including disability or death..        You may need to be admitted to the hospital again for the same condition.        Your condition could become life-threatening.

## 2024-01-17 NOTE — ED PROVIDER NOTES
Independent CHRISTOPHER Visit.       [facility] EMERGENCY DEPARTMENT  ED  Encounter Note  Admit Date/RoomTime: 2024  9:31 PM  ED Room: [place] [address]  NAME: Lukas Foss  : 2005  MRN: [id number]  PCP: No primary care provider on file.    CHIEF COMPLAINT     Ankle Pain (Right ankle pain due to pt slipping on ice and twisted it. )    HISTORY OF PRESENT ILLNESS        Lukas Foss is a 18 y.o. male who presents to the ED via private vehicle with right ankle pain.  Twisted it slipping on ice earlier today.  No associated swelling or deformity.  Ambulates with a steady gait  REVIEW OF SYSTEMS     Pertinent positives and negatives are stated within HPI, all other systems reviewed and are negative.    Past Medical History:  has a past medical history of ADHD (attention deficit hyperactivity disorder), Autism disorder, Bipolar 1 disorder (HCC), GERD (gastroesophageal reflux disease), Hypotonia, Intermittent explosive disorder, Microcephalic (HCC), and Schizoaffective disorder (HCC).  Surgical History:  has no past surgical history on file.  Social History:  reports that he has quit smoking. His smoking use included cigarettes. He has a 6.0 pack-year smoking history. He has never used smokeless tobacco. He reports that he does not currently use alcohol after a past usage of about 15.0 standard drinks of alcohol per week. He reports that he does not currently use drugs after having used the following drugs: Marijuana (Weed). Frequency: 7.00 times per week.  Family History: family history is not on file.   Allergies: Strawberry  CURRENT MEDICATIONS       Discharge Medication List as of 2024  9:57 PM        CONTINUE these medications which have NOT CHANGED    Details   acetaminophen (AMINOFEN) 325 MG tablet Take 2 tablets by mouth every 6 hours as needed for Pain, Disp-40 tablet, R-0Normal      divalproex (DEPAKOTE) 250 MG DR tablet Take 3 tablets by mouth 2 times daily,

## 2024-01-24 ENCOUNTER — APPOINTMENT (OUTPATIENT)
Dept: GENERAL RADIOLOGY | Age: 19
End: 2024-01-24
Payer: MEDICARE

## 2024-01-24 ENCOUNTER — HOSPITAL ENCOUNTER (EMERGENCY)
Age: 19
Discharge: HOME | End: 2024-01-25
Payer: MEDICARE

## 2024-01-24 VITALS
HEART RATE: 97 BPM | OXYGEN SATURATION: 99 % | TEMPERATURE: 98 F | RESPIRATION RATE: 14 BRPM | SYSTOLIC BLOOD PRESSURE: 142 MMHG | DIASTOLIC BLOOD PRESSURE: 93 MMHG

## 2024-01-24 DIAGNOSIS — S40.012A CONTUSION OF LEFT SHOULDER, INITIAL ENCOUNTER: Primary | ICD-10-CM

## 2024-01-24 DIAGNOSIS — M25.512 ACUTE PAIN OF LEFT SHOULDER: ICD-10-CM

## 2024-01-24 PROCEDURE — 99283 EMERGENCY DEPT VISIT LOW MDM: CPT

## 2024-01-24 PROCEDURE — 73030 X-RAY EXAM OF SHOULDER: CPT

## 2024-01-24 PROCEDURE — 6370000000 HC RX 637 (ALT 250 FOR IP): Performed by: NURSE PRACTITIONER

## 2024-01-24 RX ORDER — IBUPROFEN 800 MG/1
800 TABLET ORAL ONCE
Status: COMPLETED | OUTPATIENT
Start: 2024-01-24 | End: 2024-01-24

## 2024-01-24 RX ADMIN — IBUPROFEN 800 MG: 800 TABLET, FILM COATED ORAL at 23:53

## 2024-01-24 ASSESSMENT — PAIN - FUNCTIONAL ASSESSMENT: PAIN_FUNCTIONAL_ASSESSMENT: NONE - DENIES PAIN

## 2024-01-25 NOTE — ED PROVIDER NOTES
Independent   HPI:  1/25/24, Time: 2:12 AM ANTHONY Foss is a 18 y.o. male presenting to the ED for left shoulder pain that started a few hours ago [place].  Patient says his pain is only in the left shoulder and does not radiate anywhere.  Patient denies any pain in the neck, nausea, vomiting, headache, chest pain, shortness of breath.  Patient does feel that his left shoulder is swollen but on exam it is not swollen.  Patient did not attempt any kind of medications, ice or anything to help with his shoulder pain.  Patient's pain is aggravated by any kind of movement or attempts at using his left arm.  Patient denies striking his head, denied loss of consciousness and denied feeling weak or dizzy prior to this incident.  Patient without any unusual paresthesia.    Review of Systems:   A complete review of systems was performed and pertinent positives and negatives are stated within HPI, all other systems reviewed and are negative.          --------------------------------------------- PAST HISTORY ---------------------------------------------  Past Medical History:  has a past medical history of ADHD (attention deficit hyperactivity disorder), Autism disorder, Bipolar 1 disorder (HCC), GERD (gastroesophageal reflux disease), Hypotonia, Intermittent explosive disorder, Microcephalic (HCC), and Schizoaffective disorder ([facility]).    Past Surgical History:  has no past surgical history on file.    Social History:  reports that he has quit smoking. His smoking use included cigarettes. He has a 6.0 pack-year smoking history. He has never used smokeless tobacco. He reports that he does not currently use alcohol after a past usage of about 15.0 standard drinks of alcohol per week. He reports that he does not currently use drugs after having used the following drugs: Marijuana (Weed). Frequency: 7.00 times per week.    Family History: family history is not on file.     The

## 2024-02-07 ENCOUNTER — HOSPITAL ENCOUNTER (EMERGENCY)
Age: 19
Discharge: HOME | End: 2024-02-07
Payer: COMMERCIAL

## 2024-02-07 ENCOUNTER — APPOINTMENT (OUTPATIENT)
Dept: GENERAL RADIOLOGY | Age: 19
End: 2024-02-07
Payer: COMMERCIAL

## 2024-02-07 VITALS
SYSTOLIC BLOOD PRESSURE: 130 MMHG | HEART RATE: 99 BPM | HEIGHT: 69 IN | RESPIRATION RATE: 16 BRPM | DIASTOLIC BLOOD PRESSURE: 81 MMHG | TEMPERATURE: 98.5 F | WEIGHT: 170 LBS | BODY MASS INDEX: 25.18 KG/M2 | OXYGEN SATURATION: 100 %

## 2024-02-07 DIAGNOSIS — G89.29 CHRONIC PAIN OF RIGHT ANKLE: Primary | ICD-10-CM

## 2024-02-07 DIAGNOSIS — M25.571 CHRONIC PAIN OF RIGHT ANKLE: Primary | ICD-10-CM

## 2024-02-07 PROCEDURE — 73610 X-RAY EXAM OF ANKLE: CPT

## 2024-02-07 PROCEDURE — 99283 EMERGENCY DEPT VISIT LOW MDM: CPT

## 2024-02-07 PROCEDURE — 73630 X-RAY EXAM OF FOOT: CPT

## 2024-02-07 RX ORDER — IBUPROFEN 600 MG/1
600 TABLET ORAL EVERY 6 HOURS PRN
Qty: 20 TABLET | Refills: 0 | Status: SHIPPED | OUTPATIENT
Start: 2024-02-07 | End: 2024-02-12

## 2024-02-07 ASSESSMENT — LIFESTYLE VARIABLES
HOW MANY STANDARD DRINKS CONTAINING ALCOHOL DO YOU HAVE ON A TYPICAL DAY: 1 OR 2
HOW OFTEN DO YOU HAVE A DRINK CONTAINING ALCOHOL: MONTHLY OR LESS

## 2024-02-07 ASSESSMENT — PAIN - FUNCTIONAL ASSESSMENT: PAIN_FUNCTIONAL_ASSESSMENT: 0-10

## 2024-02-07 ASSESSMENT — PAIN SCALES - GENERAL: PAINLEVEL_OUTOF10: 6

## 2024-02-08 NOTE — ED PROVIDER NOTES
department by private vehicle, for non-traumatic Right ankle pain which occured a few week(s) prior to arrival.  The complaint is due to a twisting injury while at home.  Since onset the symptoms have been persistent with ability to bear weight, but with some pain.  Patient has no prior history of pain/injury with regards to today's visit. His pain is aggraveated by walking and relieved by rest of injured area.  He denies any head injury.  Tetanus Status: up to date.     Imaging was obtained, ordered by triage personnel suspicion for fracture / bony abnormality as per history/physical findings.   Plan is subsequently for symptom control, limited use and immobilization with outpatient follow-up with pcp as instructed in d/c instructions.    Plan of Care/Counseling:  Gela Paez PA-C reviewed today's visit with the patient in addition to providing specific details for the plan of care and counseling regarding the diagnosis and prognosis.  Questions are answered at this time and are agreeable with the plan.    Assessment      1. Chronic pain of right ankle      Plan   Discharged home.  Patient condition is good    New Medications     Discharge Medication List as of 2/7/2024  7:49 PM        START taking these medications    Details   ibuprofen (ADVIL;MOTRIN) 600 MG tablet Take 1 tablet by mouth every 6 hours as needed for Pain, Disp-20 tablet, R-0Normal           Electronically signed by Gela Paez PA-C   DD: 2/8/24  **This report was transcribed using voice recognition software. Every effort was made to ensure accuracy; however, inadvertent computerized transcription errors may be present.  END OF ED PROVIDER NOTE

## 2024-02-11 ENCOUNTER — HOSPITAL ENCOUNTER (EMERGENCY)
Age: 19
Discharge: HOME | End: 2024-02-11
Attending: STUDENT IN AN ORGANIZED HEALTH CARE EDUCATION/TRAINING PROGRAM
Payer: COMMERCIAL

## 2024-02-11 VITALS
RESPIRATION RATE: 18 BRPM | HEART RATE: 81 BPM | TEMPERATURE: 97.6 F | BODY MASS INDEX: 25.18 KG/M2 | WEIGHT: 170 LBS | SYSTOLIC BLOOD PRESSURE: 148 MMHG | DIASTOLIC BLOOD PRESSURE: 92 MMHG | OXYGEN SATURATION: 97 % | HEIGHT: 69 IN

## 2024-02-11 DIAGNOSIS — R51.9 ACUTE NONINTRACTABLE HEADACHE, UNSPECIFIED HEADACHE TYPE: Primary | ICD-10-CM

## 2024-02-11 PROCEDURE — 6370000000 HC RX 637 (ALT 250 FOR IP): Performed by: STUDENT IN AN ORGANIZED HEALTH CARE EDUCATION/TRAINING PROGRAM

## 2024-02-11 PROCEDURE — 99283 EMERGENCY DEPT VISIT LOW MDM: CPT

## 2024-02-11 RX ORDER — IBUPROFEN 400 MG/1
400 TABLET ORAL ONCE
Status: COMPLETED | OUTPATIENT
Start: 2024-02-11 | End: 2024-02-11

## 2024-02-11 RX ORDER — DIPHENHYDRAMINE HCL 25 MG
25 TABLET ORAL ONCE
Status: COMPLETED | OUTPATIENT
Start: 2024-02-11 | End: 2024-02-11

## 2024-02-11 RX ADMIN — DIPHENHYDRAMINE HYDROCHLORIDE 25 MG: 25 TABLET ORAL at 01:49

## 2024-02-11 RX ADMIN — IBUPROFEN 400 MG: 400 TABLET, FILM COATED ORAL at 01:49

## 2024-02-11 NOTE — ED PROVIDER NOTES
Department of Emergency Medicine   ED  Provider Note  Admit Date/RoomTime: 2/11/2024  2:20 AM  ED Room: [place] [address]          History of Present Illness:    2/11/24, Time: 1:48 AM ANTHONY Foss is a 18 y.o. male presenting to the ED for complaint of left-sided eyebrow pain.  States he thinks he might be having headache describes the pain as a pressure/dull ache to that area.  Does not notice anything making it better or worse.  Denies any falls or injuries denies any chest pain or shortness of breath.  Denies any loss of consciousness.  Denies any trauma.  Otherwise no other acute complaints this time.  Denies any fevers chills.  Also states he does not want an IV at this time    Review of Systems:   Pertinent positives and negatives are stated within HPI, all other systems reviewed and are negative.        --------------------------------------------- PAST HISTORY ---------------------------------------------  Past Medical History:  has a past medical history of ADHD (attention deficit hyperactivity disorder), Autism disorder, Bipolar 1 disorder (HCC), GERD (gastroesophageal reflux disease), Hypotonia, Intermittent explosive disorder, Microcephalic (HCC), and Schizoaffective disorder (HCC).    Past Surgical History:  has no past surgical history on file.    Social History:  reports that he has quit smoking. His smoking use included cigarettes. He has a 6.0 pack-year smoking history. He has never used smokeless tobacco. He reports that he does not currently use alcohol after a past usage of about 15.0 standard drinks of alcohol per week. He reports that he does not currently use drugs after having used the following drugs: Marijuana (Weed). Frequency: 7.00 times per week.    Family History: family history is not on file.     The patient’s home medications have been reviewed.    Allergies: Strawberry        ---------------------------------------------------PHYSICAL

## 2024-02-13 ENCOUNTER — HOSPITAL ENCOUNTER (EMERGENCY)
Age: 19
Discharge: LEFT BEFORE DISCHARGE | End: 2024-02-13
Attending: EMERGENCY MEDICINE
Payer: COMMERCIAL

## 2024-02-13 ENCOUNTER — APPOINTMENT (OUTPATIENT)
Dept: GENERAL RADIOLOGY | Age: 19
End: 2024-02-13
Payer: COMMERCIAL

## 2024-02-13 VITALS
BODY MASS INDEX: 25.18 KG/M2 | DIASTOLIC BLOOD PRESSURE: 65 MMHG | OXYGEN SATURATION: 98 % | SYSTOLIC BLOOD PRESSURE: 111 MMHG | HEIGHT: 69 IN | RESPIRATION RATE: 18 BRPM | WEIGHT: 170 LBS | TEMPERATURE: 97.7 F | HEART RATE: 87 BPM
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
## 2024-02-13 DIAGNOSIS — M79.605 PAIN OF LEFT LOWER EXTREMITY: Primary | ICD-10-CM

## 2024-02-13 PROCEDURE — 99283 EMERGENCY DEPT VISIT LOW MDM: CPT

## 2024-02-13 PROCEDURE — 73610 X-RAY EXAM OF ANKLE: CPT

## 2024-02-13 NOTE — ED PROVIDER NOTES
HPI:  2/13/24, Time: 6:37 AM ANTHONY Foss is a 18 y.o. male presenting to the ED for patient presents with pain left ankle left foot.  Patient that he fell off the [place] of a car around 430 this morning making a video caught his foot into the front bumper twisted it.  Unable to bear weight.  No previous injury to the left foot or ankle.  He denies any knee pain., beginning 2 hours ago.  The complaint has been persistent, moderate in severity, and worsened by standing, walking.      Review of Systems:   A complete review of systems was performed and pertinent positives and negatives are stated within HPI, all other systems reviewed and are negative.    --------------------------------------------- PAST HISTORY ---------------------------------------------  Past Medical History:  has a past medical history of ADHD (attention deficit hyperactivity disorder), Autism disorder, Bipolar 1 disorder (HCC), GERD (gastroesophageal reflux disease), Hypotonia, Intermittent explosive disorder, Microcephalic ([facility]), and Schizoaffective disorder ([facility]).    Past Surgical History:  has no past surgical history on file.    Social History:  reports that he has quit smoking. His smoking use included cigarettes. He has a 6.0 pack-year smoking history. He has never used smokeless tobacco. He reports that he does not currently use alcohol after a past usage of about 15.0 standard drinks of alcohol per week. He reports that he does not currently use drugs after having used the following drugs: Marijuana (Weed). Frequency: 7.00 times per week.    Family History: family history is not on file.     The patient’s home medications have been reviewed.    Allergies: Strawberry    -------------------------------------------------- RESULTS -------------------------------------------------  All laboratory and radiology results have been personally reviewed by myself   LABS:  No results found for this visit on

## 2024-02-28 ENCOUNTER — HOSPITAL ENCOUNTER (EMERGENCY)
Age: 19
Discharge: HOME | End: 2024-02-28
Payer: COMMERCIAL

## 2024-02-28 VITALS
SYSTOLIC BLOOD PRESSURE: 123 MMHG | WEIGHT: 170 LBS | BODY MASS INDEX: 25.1 KG/M2 | RESPIRATION RATE: 18 BRPM | DIASTOLIC BLOOD PRESSURE: 81 MMHG | HEART RATE: 111 BPM | TEMPERATURE: 98.2 F | OXYGEN SATURATION: 99 %

## 2024-02-28 DIAGNOSIS — R11.0 NAUSEA: Primary | ICD-10-CM

## 2024-02-28 LAB — GLUCOSE BLD-MCNC: 107 MG/DL (ref 74–99)

## 2024-02-28 PROCEDURE — 82962 GLUCOSE BLOOD TEST: CPT

## 2024-02-28 PROCEDURE — 99282 EMERGENCY DEPT VISIT SF MDM: CPT

## 2024-02-28 NOTE — ED NOTES
Patient states, \"I don't want the bloodwork, I just want my blood sugar checked. I hope I don't have diabetes.\" Patient's blood glucose resulted 107. Patient states, \"Can I just get my discharge papers?\" Rebeca GONZALEZ notified.

## 2024-02-28 NOTE — ED PROVIDER NOTES
Independent CHRISTOPHER Visit.     [facility]  Department of Emergency Medicine   ED  Encounter Note  Admit Date/RoomTime: 2024  2:31 PM  ED Room: [place] [address]  NAME: Lukas Foss  : 2005  MRN: [id number]     Chief Complaint:  Abdominal Pain (Started yesterday with nausea. )    HISTORY OF PRESENT ILLNESS        Lukas Foss is a 18 y.o. male who presents to the ED wanting his blood sugar checked.  Patient states he was nauseated last night.  Concerned that his blood sugar is off.  Patient states he has had problems with his sugars either being high or low in the past and he was not sure if that is why he was nauseated.  Patient is not a diabetic.  He is not on diabetic medications.    Patient denies headache or lightheadedness.  Denies chest pain.  Denies shortness of breath.  Denies abdominal pain states he is eating okay.  Denies vomiting or diarrhea.  Patient has no complaints at this time, just wants his blood sugar checked.      ROS   Pertinent positives and negatives are stated within HPI, all other systems reviewed and are negative.    Past Medical History:  has a past medical history of ADHD (attention deficit hyperactivity disorder), Autism disorder, Bipolar 1 disorder (HCC), GERD (gastroesophageal reflux disease), Hypotonia, Intermittent explosive disorder, Microcephalic (HCC), and Schizoaffective disorder (HCC).    Surgical History:  has no past surgical history on file.    Social History:  reports that he has quit smoking. His smoking use included cigarettes. He has a 6.0 pack-year smoking history. He has never used smokeless tobacco. He reports that he does not currently use alcohol after a past usage of about 15.0 standard drinks of alcohol per week. He reports that he does not currently use drugs after having used the following drugs: Marijuana (Weed). Frequency: 7.00 times per week.    Family History: family history is not on file.     Allergies:

## 2024-02-29 ENCOUNTER — HOSPITAL ENCOUNTER (EMERGENCY)
Age: 19
Discharge: HOME | End: 2024-03-01
Payer: COMMERCIAL

## 2024-02-29 ENCOUNTER — APPOINTMENT (OUTPATIENT)
Dept: GENERAL RADIOLOGY | Age: 19
End: 2024-02-29
Payer: COMMERCIAL

## 2024-02-29 VITALS
HEIGHT: 69 IN | RESPIRATION RATE: 18 BRPM | BODY MASS INDEX: 25.18 KG/M2 | TEMPERATURE: 98.4 F | HEART RATE: 98 BPM | OXYGEN SATURATION: 99 % | WEIGHT: 170 LBS | SYSTOLIC BLOOD PRESSURE: 124 MMHG | DIASTOLIC BLOOD PRESSURE: 90 MMHG

## 2024-02-29 DIAGNOSIS — S83.91XA SPRAIN OF RIGHT KNEE, UNSPECIFIED LIGAMENT, INITIAL ENCOUNTER: Primary | ICD-10-CM

## 2024-02-29 PROCEDURE — 73562 X-RAY EXAM OF KNEE 3: CPT

## 2024-02-29 PROCEDURE — 99283 EMERGENCY DEPT VISIT LOW MDM: CPT

## 2024-02-29 PROCEDURE — 6370000000 HC RX 637 (ALT 250 FOR IP): Performed by: NURSE PRACTITIONER

## 2024-02-29 RX ORDER — IBUPROFEN 800 MG/1
800 TABLET ORAL ONCE
Status: COMPLETED | OUTPATIENT
Start: 2024-02-29 | End: 2024-02-29

## 2024-02-29 RX ADMIN — IBUPROFEN 800 MG: 800 TABLET, FILM COATED ORAL at 21:24

## 2024-02-29 ASSESSMENT — PAIN - FUNCTIONAL ASSESSMENT: PAIN_FUNCTIONAL_ASSESSMENT: NONE - DENIES PAIN

## 2024-03-01 ENCOUNTER — HOSPITAL ENCOUNTER (EMERGENCY)
Age: 19
Discharge: LEFT BEFORE DISCHARGE | End: 2024-03-01

## 2024-03-01 ENCOUNTER — HOSPITAL ENCOUNTER (EMERGENCY)
Age: 19
Discharge: HOME | End: 2024-03-01
Attending: EMERGENCY MEDICINE
Payer: COMMERCIAL

## 2024-03-01 VITALS — HEART RATE: 110 BPM | OXYGEN SATURATION: 98 % | TEMPERATURE: 97.6 F | RESPIRATION RATE: 16 BRPM

## 2024-03-01 VITALS
SYSTOLIC BLOOD PRESSURE: 114 MMHG | OXYGEN SATURATION: 98 % | RESPIRATION RATE: 16 BRPM | HEART RATE: 83 BPM | TEMPERATURE: 98 F | DIASTOLIC BLOOD PRESSURE: 79 MMHG

## 2024-03-01 DIAGNOSIS — F32.A DEPRESSION, UNSPECIFIED DEPRESSION TYPE: Primary | ICD-10-CM

## 2024-03-01 LAB
ALBUMIN SERPL-MCNC: 4.3 G/DL (ref 3.5–5.2)
ALP SERPL-CCNC: 66 U/L (ref 40–129)
ALT SERPL-CCNC: 8 U/L (ref 0–40)
AMPHET UR QL SCN: NEGATIVE
ANION GAP SERPL CALCULATED.3IONS-SCNC: 12 MMOL/L (ref 7–16)
APAP SERPL-MCNC: <5 UG/ML (ref 10–30)
AST SERPL-CCNC: 14 U/L (ref 0–39)
BARBITURATES UR QL SCN: NEGATIVE
BASOPHILS # BLD: 0.03 K/UL (ref 0–0.2)
BASOPHILS NFR BLD: 0 % (ref 0–2)
BENZODIAZ UR QL: NEGATIVE
BILIRUB SERPL-MCNC: 0.6 MG/DL (ref 0–1.2)
BILIRUB UR QL STRIP: NEGATIVE
BUN SERPL-MCNC: 15 MG/DL (ref 6–20)
BUPRENORPHINE UR QL: NEGATIVE
CALCIUM SERPL-MCNC: 9.4 MG/DL (ref 8.6–10.2)
CANNABINOIDS UR QL SCN: POSITIVE
CHLORIDE SERPL-SCNC: 101 MMOL/L (ref 98–107)
CLARITY UR: CLEAR
CO2 SERPL-SCNC: 25 MMOL/L (ref 22–29)
COCAINE UR QL SCN: NEGATIVE
COLOR UR: YELLOW
CREAT SERPL-MCNC: 0.9 MG/DL (ref 0.7–1.2)
DATE LAST DOSE: ABNORMAL
EKG ATRIAL RATE: 58 BPM
EKG P AXIS: 63 DEGREES
EKG P-R INTERVAL: 148 MS
EKG Q-T INTERVAL: 410 MS
EKG QRS DURATION: 90 MS
EKG QTC CALCULATION (BAZETT): 402 MS
EKG R AXIS: 50 DEGREES
EKG T AXIS: 41 DEGREES
EKG VENTRICULAR RATE: 58 BPM
EOSINOPHIL # BLD: 0.55 K/UL (ref 0.05–0.5)
EOSINOPHILS RELATIVE PERCENT: 6 % (ref 0–6)
ERYTHROCYTE [DISTWIDTH] IN BLOOD BY AUTOMATED COUNT: 12.7 % (ref 11.5–15)
ETHANOLAMINE SERPL-MCNC: <10 MG/DL
FENTANYL UR QL: NEGATIVE
GFR SERPL CREATININE-BSD FRML MDRD: >60 ML/MIN/1.73M2
GLUCOSE SERPL-MCNC: 87 MG/DL (ref 74–99)
GLUCOSE UR STRIP-MCNC: NEGATIVE MG/DL
HCT VFR BLD AUTO: 47 % (ref 37–54)
HGB BLD-MCNC: 15.5 G/DL (ref 12.5–16.5)
HGB UR QL STRIP.AUTO: ABNORMAL
IMM GRANULOCYTES # BLD AUTO: 0.03 K/UL (ref 0–0.58)
IMM GRANULOCYTES NFR BLD: 0 % (ref 0–5)
KETONES UR STRIP-MCNC: ABNORMAL MG/DL
LEUKOCYTE ESTERASE UR QL STRIP: NEGATIVE
LYMPHOCYTES NFR BLD: 3.22 K/UL (ref 1.5–4)
LYMPHOCYTES RELATIVE PERCENT: 36 % (ref 20–42)
MCH RBC QN AUTO: 29 PG (ref 26–35)
MCHC RBC AUTO-ENTMCNC: 33 G/DL (ref 32–34.5)
MCV RBC AUTO: 88 FL (ref 80–99.9)
METHADONE UR QL: NEGATIVE
MONOCYTES NFR BLD: 0.6 K/UL (ref 0.1–0.95)
MONOCYTES NFR BLD: 7 % (ref 2–12)
NEUTROPHILS NFR BLD: 50 % (ref 43–80)
NEUTS SEG NFR BLD: 4.43 K/UL (ref 1.8–7.3)
NITRITE UR QL STRIP: NEGATIVE
OPIATES UR QL SCN: NEGATIVE
OXYCODONE UR QL SCN: NEGATIVE
PCP UR QL SCN: NEGATIVE
PH UR STRIP: 6 [PH] (ref 5–9)
PLATELET # BLD AUTO: 208 K/UL (ref 130–450)
PMV BLD AUTO: 8.9 FL (ref 7–12)
POTASSIUM SERPL-SCNC: 3.4 MMOL/L (ref 3.5–5)
PROT SERPL-MCNC: 6.8 G/DL (ref 6.4–8.3)
PROT UR STRIP-MCNC: ABNORMAL MG/DL
RBC # BLD AUTO: 5.34 M/UL (ref 3.8–5.8)
RBC #/AREA URNS HPF: NORMAL /HPF
SALICYLATES SERPL-MCNC: <0.3 MG/DL (ref 0–30)
SODIUM SERPL-SCNC: 138 MMOL/L (ref 132–146)
SP GR UR STRIP: >1.03 (ref 1–1.03)
T4 SERPL-MCNC: 6.7 UG/DL (ref 4.5–11.7)
TEST INFORMATION: ABNORMAL
TME LAST DOSE: ABNORMAL H
TOXIC TRICYCLIC SC,BLOOD: NEGATIVE
TSH SERPL DL<=0.05 MIU/L-ACNC: 2.45 UIU/ML (ref 0.27–4.2)
UROBILINOGEN UR STRIP-ACNC: 0.2 EU/DL (ref 0–1)
VALPROATE SERPL-MCNC: <3 UG/ML (ref 50–100)
VANCOMYCIN DOSE: ABNORMAL MG
WBC #/AREA URNS HPF: NORMAL /HPF
WBC OTHER # BLD: 8.9 K/UL (ref 4.5–11.5)

## 2024-03-01 PROCEDURE — 80179 DRUG ASSAY SALICYLATE: CPT

## 2024-03-01 PROCEDURE — 80164 ASSAY DIPROPYLACETIC ACD TOT: CPT

## 2024-03-01 PROCEDURE — 81001 URINALYSIS AUTO W/SCOPE: CPT

## 2024-03-01 PROCEDURE — 85025 COMPLETE CBC W/AUTO DIFF WBC: CPT

## 2024-03-01 PROCEDURE — 93005 ELECTROCARDIOGRAM TRACING: CPT | Performed by: EMERGENCY MEDICINE

## 2024-03-01 PROCEDURE — 84443 ASSAY THYROID STIM HORMONE: CPT

## 2024-03-01 PROCEDURE — 84436 ASSAY OF TOTAL THYROXINE: CPT

## 2024-03-01 PROCEDURE — 99284 EMERGENCY DEPT VISIT MOD MDM: CPT

## 2024-03-01 PROCEDURE — 80053 COMPREHEN METABOLIC PANEL: CPT

## 2024-03-01 PROCEDURE — 80307 DRUG TEST PRSMV CHEM ANLYZR: CPT

## 2024-03-01 PROCEDURE — 93010 ELECTROCARDIOGRAM REPORT: CPT | Performed by: INTERNAL MEDICINE

## 2024-03-01 PROCEDURE — 80143 DRUG ASSAY ACETAMINOPHEN: CPT

## 2024-03-01 PROCEDURE — G0480 DRUG TEST DEF 1-7 CLASSES: HCPCS

## 2024-03-01 ASSESSMENT — PAIN - FUNCTIONAL ASSESSMENT
PAIN_FUNCTIONAL_ASSESSMENT: NONE - DENIES PAIN
PAIN_FUNCTIONAL_ASSESSMENT: NONE - DENIES PAIN

## 2024-03-01 NOTE — ED NOTES
PT DECLINED TO [facility] DUE TO HIS HX OF BEING ACCEPTED TO [facility] AND LEAVING 1 HOUR LATER.    AS PT CONTINUES TO DENY SI, HE DOES NOT MEET IN PT CRITERIA FOR ADMISSION.      PT REPORTS THAT HE IS STAYING WITH HIS GRANDMA AT [facility] IN [place], BUT HE DOES NOT WANT TO BE REFERRED TO [facility], \"I DON'T WANT TO GO THERE, ILL JUST GO HOME\".      PT NEEDS TO FOLLOW UP WITH OUT PT SERVICES.  HE SAYS THAT HE NO LONGER WANTS TO TREAT WITH [facility].      REFERRAL INFORMATION TO [facility] AND OTHER PROVIDERS PROVIDED.    PT CONTINUES TO DENY SI, NO HI AND NO HALLUCINATIONS.  HE HAS BEEN TALKING WITH STAFF APPROPRIATELY, TALKING ABOUT THE MONEY HE HAS BEEN SPENDING ON SHOES, IS ALERT, ORIENTED X'S 4, SHARES GOOD EYE CONTACT, HAS CLEAR SPEECH, THOUGHTS ARE STABLE AND LUCID, INSIGHT AND JUDGEMENT ARE STABLE.

## 2024-03-01 NOTE — ED PROVIDER NOTES
Independent  HPI:  2/29/24, Time: 9:22 PM ANTHONY Foss is a 18 y.o. male presenting to the ED for right knee pain.  Patient presents to the emergency department with complaints of right knee pain.  States that he was walking it and twisted it.  He denies ever falling to the ground.  He reports that afterwards he was unable to bear weight.  Patient did not take anything prior to arrival.  He denies any previous surgeries to the site.  On arrival there is no gross deformity.  Patient denied feeling weak or dizzy prior to this injury.  He denies any unusual paresthesia.  No other areas of injury.    Review of Systems:   A complete review of systems was performed and pertinent positives and negatives are stated within HPI, all other systems reviewed and are negative.          --------------------------------------------- PAST HISTORY ---------------------------------------------  Past Medical History:  has a past medical history of ADHD (attention deficit hyperactivity disorder), Autism disorder, Bipolar 1 disorder (HCC), GERD (gastroesophageal reflux disease), Hypotonia, Intermittent explosive disorder, Microcephalic ([facility]), and Schizoaffective disorder ([facility]).    Past Surgical History:  has no past surgical history on file.    Social History:  reports that he has quit smoking. His smoking use included cigarettes. He has a 6.0 pack-year smoking history. He has never used smokeless tobacco. He reports that he does not currently use alcohol after a past usage of about 15.0 standard drinks of alcohol per week. He reports that he does not currently use drugs after having used the following drugs: Marijuana (Weed). Frequency: 7.00 times per week.    Family History: family history is not on file.     The patient’s home medications have been reviewed.    Allergies: Strawberry    -------------------------------------------------- RESULTS -------------------------------------------------  All laboratory and    Appropriate for outpatient management yes and Evaluation by myself and discharge recommended.      I am the Primary Clinician of Record.      Counseling:   The emergency provider has spoken with the patient and discussed today’s results, in addition to providing specific details for the plan of care and counseling regarding the diagnosis and prognosis.  Questions are answered at this time and they are agreeable with the plan.      --------------------------------- IMPRESSION AND DISPOSITION ---------------------------------    IMPRESSION  1. Sprain of right knee, unspecified ligament, initial encounter        DISPOSITION  Disposition: Discharge to home  Patient condition is stable      NOTE: This report was transcribed using voice recognition software. Every effort was made to ensure accuracy; however, inadvertent computerized transcription errors may be present

## 2024-03-01 NOTE — ED NOTES
Pt here for over 6 hours, asking to be referred to [facility].    Pt reporting to be depressed and wants to get back on his meds.  Pt has poor follow up with out pt provider services.  Pt denies SI and Hi and is having no hallucinations.    Pt is medically cleared.

## 2024-03-01 NOTE — DISCHARGE INSTRUCTIONS
Weightbearing as tolerated.  Wear Ace wrap and knee immobilizer at all times.  Advance slowly with weightbearing.  Take high-dose Motrin to assist with pain relief.  Perform rest, ice, compression and elevation.  Follow-up with primary care doctor in 1 week.

## 2024-03-01 NOTE — ED PROVIDER NOTES
ED PROVIDER NOTE    Chief Complaint   Patient presents with    Psychiatric Evaluation       HPI:  3/1/24,   Time: 2:39 AM ANTHONY Foss is a 18 y.o. male presenting to the ED for depression.  Patient states that he has been off of his valproic acid for several weeks to months, states that he is feeling increasingly depressed for the last day.  No SI/HI/AVH.  He was seen earlier this evening for a knee injury and discharged home.  No drug or alcohol use.  Has not followed up with his outpatient behavioral health provider.  He is requesting to go to the crisis unit.  No recent illness.    Chart review: hx of ADHD, autism, bipolar disorder    Reviewed inpatient behavioral health DC summary from 12/26/23 by Georgiana Hart NP:  Admitted for homicidal ideation in setting of stopping his depakote.    Review of Systems:     Review of Systems  Pertinent positives and negatives as stated in HPI     --------------------------------------------- PAST HISTORY ---------------------------------------------  Past Medical History:   Past Medical History:   Diagnosis Date    ADHD (attention deficit hyperactivity disorder)     Autism disorder     Bipolar 1 disorder (HCC)     GERD (gastroesophageal reflux disease)     Hypotonia     Intermittent explosive disorder     Microcephalic (HCC)     Schizoaffective disorder (HCC)        Past Surgical History:   History reviewed. No pertinent surgical history.    Social History:   Social History     Socioeconomic History    Marital status: Single     Spouse name: None    Number of children: 2    Years of education: 11    Highest education level: None   Tobacco Use    Smoking status: Former     Current packs/day: 1.00     Average packs/day: 1 pack/day for 6.0 years (6.0 ttl pk-yrs)     Types: Cigarettes    Smokeless tobacco: Never   Vaping Use    Vaping Use: Every day    Substances: Nicotine, Flavoring    Devices: Disposable   Substance and Sexual Activity    Alcohol use: Not  recognition software. Every effort was made to ensure accuracy; however, inadvertent computerized transcription errors may be present    Dk Alvarez MD  Attending Emergency Physician        Dk Alvarez MD  03/01/24 [id number]

## 2024-03-01 NOTE — ED NOTES
Dr. Alvarez at bedside. Patient stated \"I was wondering if I could get sent to the crisis unit\".

## 2024-03-01 NOTE — DISCHARGE INSTRUCTIONS
[facility]  Location: [address] [place] [address]  Phone number: [phone]  Fax number: [phone]      [facility]   [address] [place], [place] [address]  Phone: [phone]  Fax: [phone]  Please be sure to bring a copy of your discharge paperwork from the hospital, your ID, and insurance card(s) to the appointment.  You can expect to be at the agency for two hours, which will include completing paperwork and meeting with a clinician for the initial appointment.  If you have questions, or need to reschedule this appointment, please call [phone], and we look forward to working with you.      Please reach out to one of the following community providers for treatment and support.   Help Hotline: [facility], [phone] or [phone]    [facility] Mental Health and Recovery Board [phone]  [facility] Mental Health and Recovery Board [phone]  [facility] Mental Health and Recovery Board [phone]  If you are in need of help and experiencing any barriers to care please contact help hotline 24 hours a day and/or your local board of mental health and recovery during normal business hours.   Detox program inpatient:   [facility] [phone]  [facility] [phone]  [facility] [phone]  [facility] [phone]  [facility] [phone]  VA services hotline [phone]    Outpatient programs  [facility] outpatient treatment [phone] ext. [phone]  [place] [facility] [phone]  [facility] [phone]  [facility] [phone]  VA services:   [facility] [phone]  [facility] [phone]  [place] [phone]  [facility] [phone]  [facility] [phone]  [facility] [phone]  [facility] [phone]   [facility] [phone]  The counseling center [facility] [phone]    For

## 2024-03-01 NOTE — ED NOTES
Patient belongings labeled and stored into Section G locker #27. 1 bag.       Patient has a knee immobilizer within his belongings that he received from ED earlier today and is refusing to wear at this time.

## 2024-03-02 NOTE — GROUP NOTE
Problem: Adult Inpatient Plan of Care  Goal: Plan of Care Review  Outcome: Ongoing, Progressing  Goal: Patient-Specific Goal (Individualized)  Outcome: Ongoing, Progressing  Goal: Absence of Hospital-Acquired Illness or Injury  Outcome: Ongoing, Progressing  Goal: Optimal Comfort and Wellbeing  Outcome: Ongoing, Progressing  Goal: Readiness for Transition of Care  Outcome: Ongoing, Progressing     Problem: Adjustment to Illness (Delirium)  Goal: Optimal Coping  Outcome: Ongoing, Progressing     Problem: Altered Behavior (Delirium)  Goal: Improved Behavioral Control  Outcome: Ongoing, Progressing     Problem: Attention and Thought Clarity Impairment (Delirium)  Goal: Improved Attention and Thought Clarity  Outcome: Ongoing, Progressing     Problem: Sleep Disturbance (Delirium)  Goal: Improved Sleep  Outcome: Ongoing, Progressing     Problem: Infection  Goal: Absence of Infection Signs and Symptoms  Outcome: Ongoing, Progressing     Problem: Diabetes Comorbidity  Goal: Blood Glucose Level Within Targeted Range  Outcome: Ongoing, Progressing     Problem: Impaired Wound Healing  Goal: Optimal Wound Healing  Outcome: Ongoing, Progressing     Problem: Fall Injury Risk  Goal: Absence of Fall and Fall-Related Injury  Outcome: Ongoing, Progressing     Problem: Hypertension Acute  Goal: Blood Pressure Within Desired Range  Outcome: Ongoing, Progressing     Problem: Adjustment to Device (Cardiac Rhythm Management Device)  Goal: Optimal Adjustment to Device  Outcome: Ongoing, Progressing     Problem: Bleeding (Cardiac Rhythm Management Device)  Goal: Absence of Bleeding  Outcome: Ongoing, Progressing     Problem: Device-Related Complication Risk (Cardiac Rhythm Management Device)  Goal: Effective Device Function  Outcome: Ongoing, Progressing     Problem: Infection (Cardiac Rhythm Management Device)  Goal: Absence of Infection Signs and Symptoms  Outcome: Ongoing, Progressing     Problem: Pain (Cardiac Rhythm Management  Group Therapy Note    Date: 6/2/2022    Group Start Time: 1100  Group End Time: 1150  Group Topic: Psychotherapy    [facility] ACUTE  [address] [facility], [facility]        Group Therapy Note    Attendees: 5         Patient's Goal: To increase social interaction and improve relationships with others. Notes:  Pt was attentive during group and was able to identify an agenda. Pt discussed violence during the group and stated that he enjoys \"going to the shooting range to let out his anger so he does not go into the streets and harm people. \" SW informed NP of this information. Pt stated that he has weapons and access to angelic at home. Assigned SW aware.      Status After Intervention:  Decompensated    Participation Level: Monopolizing    Participation Quality: Inappropriate and Intrusive      Speech:  pressured      Thought Process/Content: Flight of ideas      Affective Functioning: Exaggerated      Mood: angry and irritable      Level of consciousness:  Preoccupied and Inattentive      Response to Learning: Resistant      Endings: Homicidality    Modes of Intervention: Support, Socialization and Exploration      Discipline Responsible: /Counselor      Signature:  CHELSEY Geiger, [place] Device)  Goal: Acceptable Pain Level  Outcome: Ongoing, Progressing     Problem: Respiratory Compromise (Cardiac Rhythm Management Device)  Goal: Effective Oxygenation and Ventilation  Outcome: Ongoing, Progressing     Problem: Syncope  Goal: Absence of Syncopal Symptoms  Outcome: Ongoing, Progressing     Problem: Pain Acute  Goal: Acceptable Pain Control and Functional Ability  Outcome: Ongoing, Progressing

## 2024-05-09 ENCOUNTER — HOSPITAL ENCOUNTER (EMERGENCY)
Age: 19
Discharge: HOME | End: 2024-05-09
Attending: EMERGENCY MEDICINE
Payer: COMMERCIAL

## 2024-05-09 ENCOUNTER — APPOINTMENT (OUTPATIENT)
Dept: GENERAL RADIOLOGY | Age: 19
End: 2024-05-09
Payer: COMMERCIAL

## 2024-05-09 VITALS
SYSTOLIC BLOOD PRESSURE: 132 MMHG | DIASTOLIC BLOOD PRESSURE: 94 MMHG | TEMPERATURE: 97.2 F | OXYGEN SATURATION: 100 % | HEART RATE: 74 BPM | RESPIRATION RATE: 16 BRPM

## 2024-05-09 DIAGNOSIS — S00.83XA CONTUSION OF JAW, INITIAL ENCOUNTER: Primary | ICD-10-CM
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
## 2024-05-09 PROCEDURE — 99283 EMERGENCY DEPT VISIT LOW MDM: CPT

## 2024-05-09 PROCEDURE — 70110 X-RAY EXAM OF JAW 4/> VIEWS: CPT

## 2024-05-09 RX ORDER — IBUPROFEN 600 MG/1
600 TABLET ORAL EVERY 8 HOURS PRN
Qty: 30 TABLET | Refills: 0 | Status: SHIPPED | OUTPATIENT
Start: 2024-05-09 | End: 2024-05-19

## 2024-05-09 ASSESSMENT — PAIN SCALES - GENERAL: PAINLEVEL_OUTOF10: 10

## 2024-05-09 ASSESSMENT — PAIN DESCRIPTION - ORIENTATION: ORIENTATION: LEFT

## 2024-05-09 ASSESSMENT — ENCOUNTER SYMPTOMS
EYE PAIN: 0
NAUSEA: 0
DIARRHEA: 0
SORE THROAT: 0
COUGH: 0
WHEEZING: 0
EYE DISCHARGE: 0
EYE REDNESS: 0
SINUS PRESSURE: 0
VOMITING: 0
ABDOMINAL PAIN: 0
BACK PAIN: 0
SHORTNESS OF BREATH: 0

## 2024-05-09 ASSESSMENT — PAIN - FUNCTIONAL ASSESSMENT: PAIN_FUNCTIONAL_ASSESSMENT: 0-10

## 2024-05-09 ASSESSMENT — PAIN DESCRIPTION - LOCATION: LOCATION: JAW

## 2024-05-09 ASSESSMENT — PAIN DESCRIPTION - DESCRIPTORS: DESCRIPTORS: ACHING
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
## 2024-05-25 ENCOUNTER — APPOINTMENT (OUTPATIENT)
Dept: GENERAL RADIOLOGY | Age: 19
End: 2024-05-25
Payer: COMMERCIAL

## 2024-05-25 ENCOUNTER — HOSPITAL ENCOUNTER (EMERGENCY)
Age: 19
Discharge: HOME | End: 2024-05-26
Attending: STUDENT IN AN ORGANIZED HEALTH CARE EDUCATION/TRAINING PROGRAM
Payer: COMMERCIAL

## 2024-05-25 DIAGNOSIS — F39 MOOD DISORDER (HCC): ICD-10-CM

## 2024-05-25 DIAGNOSIS — M79.641 HAND PAIN, RIGHT: Primary | ICD-10-CM

## 2024-05-25 LAB
ALBUMIN SERPL-MCNC: 4.6 G/DL (ref 3.5–5.2)
ALP SERPL-CCNC: 86 U/L (ref 40–129)
ALT SERPL-CCNC: 13 U/L (ref 0–40)
ANION GAP SERPL CALCULATED.3IONS-SCNC: 10 MMOL/L (ref 7–16)
APAP SERPL-MCNC: <5 UG/ML (ref 10–30)
AST SERPL-CCNC: 20 U/L (ref 0–39)
BASOPHILS # BLD: 0.05 K/UL (ref 0–0.2)
BASOPHILS NFR BLD: 0 % (ref 0–2)
BILIRUB SERPL-MCNC: 0.3 MG/DL (ref 0–1.2)
BUN SERPL-MCNC: 16 MG/DL (ref 6–20)
CALCIUM SERPL-MCNC: 9.4 MG/DL (ref 8.6–10.2)
CHLORIDE SERPL-SCNC: 102 MMOL/L (ref 98–107)
CO2 SERPL-SCNC: 27 MMOL/L (ref 22–29)
CREAT SERPL-MCNC: 0.9 MG/DL (ref 0.7–1.2)
EOSINOPHIL # BLD: 0.09 K/UL (ref 0.05–0.5)
EOSINOPHILS RELATIVE PERCENT: 1 % (ref 0–6)
ERYTHROCYTE [DISTWIDTH] IN BLOOD BY AUTOMATED COUNT: 12.5 % (ref 11.5–15)
ETHANOLAMINE SERPL-MCNC: <10 MG/DL (ref 0–0.08)
GFR, ESTIMATED: >90 ML/MIN/1.73M2
GLUCOSE BLD-MCNC: 148 MG/DL (ref 74–99)
GLUCOSE SERPL-MCNC: 146 MG/DL (ref 74–99)
HCT VFR BLD AUTO: 48.2 % (ref 37–54)
HGB BLD-MCNC: 15.6 G/DL (ref 12.5–16.5)
IMM GRANULOCYTES # BLD AUTO: 0.12 K/UL (ref 0–0.58)
IMM GRANULOCYTES NFR BLD: 1 % (ref 0–5)
LYMPHOCYTES NFR BLD: 1.36 K/UL (ref 1.5–4)
LYMPHOCYTES RELATIVE PERCENT: 8 % (ref 20–42)
MCH RBC QN AUTO: 28.5 PG (ref 26–35)
MCHC RBC AUTO-ENTMCNC: 32.4 G/DL (ref 32–34.5)
MCV RBC AUTO: 88.1 FL (ref 80–99.9)
MONOCYTES NFR BLD: 0.69 K/UL (ref 0.1–0.95)
MONOCYTES NFR BLD: 4 % (ref 2–12)
NEUTROPHILS NFR BLD: 87 % (ref 43–80)
NEUTS SEG NFR BLD: 14.95 K/UL (ref 1.8–7.3)
PLATELET # BLD AUTO: 251 K/UL (ref 130–450)
PMV BLD AUTO: 9 FL (ref 7–12)
POTASSIUM SERPL-SCNC: 4 MMOL/L (ref 3.5–5)
PROT SERPL-MCNC: 7 G/DL (ref 6.4–8.3)
RBC # BLD AUTO: 5.47 M/UL (ref 3.8–5.8)
SALICYLATES SERPL-MCNC: <0.3 MG/DL (ref 0–30)
SODIUM SERPL-SCNC: 139 MMOL/L (ref 132–146)
TOXIC TRICYCLIC SC,BLOOD: NEGATIVE
WBC OTHER # BLD: 17.3 K/UL (ref 4.5–11.5)

## 2024-05-25 PROCEDURE — 82550 ASSAY OF CK (CPK): CPT

## 2024-05-25 PROCEDURE — 82962 GLUCOSE BLOOD TEST: CPT

## 2024-05-25 PROCEDURE — 80053 COMPREHEN METABOLIC PANEL: CPT

## 2024-05-25 PROCEDURE — 80307 DRUG TEST PRSMV CHEM ANLYZR: CPT

## 2024-05-25 PROCEDURE — 73130 X-RAY EXAM OF HAND: CPT

## 2024-05-25 PROCEDURE — 80143 DRUG ASSAY ACETAMINOPHEN: CPT

## 2024-05-25 PROCEDURE — 80179 DRUG ASSAY SALICYLATE: CPT

## 2024-05-25 PROCEDURE — 99285 EMERGENCY DEPT VISIT HI MDM: CPT

## 2024-05-25 PROCEDURE — 93005 ELECTROCARDIOGRAM TRACING: CPT | Performed by: PHYSICIAN ASSISTANT

## 2024-05-25 PROCEDURE — G0480 DRUG TEST DEF 1-7 CLASSES: HCPCS

## 2024-05-25 PROCEDURE — 85025 COMPLETE CBC W/AUTO DIFF WBC: CPT

## 2024-05-25 PROCEDURE — 2580000003 HC RX 258: Performed by: PHYSICIAN ASSISTANT

## 2024-05-25 RX ORDER — NALOXONE HYDROCHLORIDE 4 MG/.1ML
1 SPRAY NASAL PRN
Status: DISCONTINUED | OUTPATIENT
Start: 2024-05-25 | End: 2024-05-26 | Stop reason: HOSPADM

## 2024-05-25 RX ORDER — 0.9 % SODIUM CHLORIDE 0.9 %
1000 INTRAVENOUS SOLUTION INTRAVENOUS ONCE
Status: COMPLETED | OUTPATIENT
Start: 2024-05-25 | End: 2024-05-25

## 2024-05-25 RX ADMIN — SODIUM CHLORIDE 1000 ML: 9 INJECTION, SOLUTION INTRAVENOUS at 21:15

## 2024-05-25 NOTE — ED PROVIDER NOTES
LABS:  Results for orders placed or performed during the hospital encounter of 05/25/24   CBC with Auto Differential   Result Value Ref Range    WBC 17.3 (H) 4.5 - 11.5 k/uL    RBC 5.47 3.80 - 5.80 m/uL    Hemoglobin 15.6 12.5 - 16.5 g/dL    Hematocrit 48.2 37.0 - 54.0 %    MCV 88.1 80.0 - 99.9 fL    MCH 28.5 26.0 - 35.0 pg    MCHC 32.4 32.0 - 34.5 g/dL    RDW 12.5 11.5 - 15.0 %    Platelets 251 130 - 450 k/uL    MPV 9.0 7.0 - 12.0 fL    Neutrophils % 87 (H) 43.0 - 80.0 %    Lymphocytes % 8 (L) 20.0 - 42.0 %    Monocytes % 4 2.0 - 12.0 %    Eosinophils % 1 0 - 6 %    Basophils % 0 0.0 - 2.0 %    Immature Granulocytes % 1 0.0 - 5.0 %    Neutrophils Absolute 14.95 (H) 1.80 - 7.30 k/uL    Lymphocytes Absolute 1.36 (L) 1.50 - 4.00 k/uL    Monocytes Absolute 0.69 0.10 - 0.95 k/uL    Eosinophils Absolute 0.09 0.05 - 0.50 k/uL    Basophils Absolute 0.05 0.00 - 0.20 k/uL    Immature Granulocytes Absolute 0.12 0.00 - 0.58 k/uL   Comprehensive Metabolic Panel   Result Value Ref Range    Sodium 139 132 - 146 mmol/L    Potassium 4.0 3.5 - 5.0 mmol/L    Chloride 102 98 - 107 mmol/L    CO2 27 22 - 29 mmol/L    Anion Gap 10 7 - 16 mmol/L    Glucose 146 (H) 74 - 99 mg/dL    BUN 16 6 - 20 mg/dL    Creatinine 0.9 0.70 - 1.20 mg/dL    Est, Glom Filt Rate >90 >60 mL/min/1.73m2    Calcium 9.4 8.6 - 10.2 mg/dL    Total Protein 7.0 6.4 - 8.3 g/dL    Albumin 4.6 3.5 - 5.2 g/dL    Total Bilirubin 0.3 0.0 - 1.2 mg/dL    Alkaline Phosphatase 86 40 - 129 U/L    ALT 13 0 - 40 U/L    AST 20 0 - 39 U/L   Urinalysis with Microscopic   Result Value Ref Range    Color, UA Yellow Yellow    Turbidity UA Clear Clear    Glucose, Ur NEGATIVE NEGATIVE mg/dL    Bilirubin, Urine NEGATIVE NEGATIVE    Ketones, Urine NEGATIVE NEGATIVE mg/dL    Specific Gravity, UA 1.025 1.005 - 1.030    Urine Hgb NEGATIVE NEGATIVE    pH, Urine 6.0 5.0 - 9.0    Protein, UA 30 (A) NEGATIVE mg/dL    Urobilinogen, Urine 0.2 0.0 - 1.0 EU/dL    Nitrite, Urine NEGATIVE NEGATIVE

## 2024-05-26 VITALS
TEMPERATURE: 97.7 F | OXYGEN SATURATION: 99 % | RESPIRATION RATE: 15 BRPM | DIASTOLIC BLOOD PRESSURE: 78 MMHG | SYSTOLIC BLOOD PRESSURE: 120 MMHG | HEART RATE: 73 BPM

## 2024-05-26 LAB
AMPHET UR QL SCN: NEGATIVE
BACTERIA URNS QL MICRO: ABNORMAL
BARBITURATES UR QL SCN: NEGATIVE
BENZODIAZ UR QL: NEGATIVE
BILIRUB UR QL STRIP: NEGATIVE
BUPRENORPHINE UR QL: NEGATIVE
CANNABINOIDS UR QL SCN: POSITIVE
CK SERPL-CCNC: 281 U/L (ref 20–200)
CLARITY UR: CLEAR
COCAINE UR QL SCN: NEGATIVE
COLOR UR: YELLOW
FENTANYL UR QL: NEGATIVE
GLUCOSE UR STRIP-MCNC: NEGATIVE MG/DL
HGB UR QL STRIP.AUTO: NEGATIVE
KETONES UR STRIP-MCNC: NEGATIVE MG/DL
LEUKOCYTE ESTERASE UR QL STRIP: NEGATIVE
METHADONE UR QL: NEGATIVE
NITRITE UR QL STRIP: NEGATIVE
OPIATES UR QL SCN: NEGATIVE
OXYCODONE UR QL SCN: NEGATIVE
PCP UR QL SCN: NEGATIVE
PH UR STRIP: 6 [PH] (ref 5–9)
PROT UR STRIP-MCNC: 30 MG/DL
RBC #/AREA URNS HPF: ABNORMAL /HPF
SP GR UR STRIP: 1.02 (ref 1–1.03)
TEST INFORMATION: ABNORMAL
UROBILINOGEN UR STRIP-ACNC: 0.2 EU/DL (ref 0–1)
WBC #/AREA URNS HPF: ABNORMAL /HPF

## 2024-05-26 PROCEDURE — 80307 DRUG TEST PRSMV CHEM ANLYZR: CPT

## 2024-05-26 PROCEDURE — 81001 URINALYSIS AUTO W/SCOPE: CPT

## 2024-05-26 NOTE — ED NOTES
for the patient to complete reassessment.    Patient is alert, oriented x 3, stable mood/behavior, thought process appears at baseline, no signs of agitation, fair hygiene.    Patient denies any current SI/HI, hallucinations or paranoia. Patient feels that to discharge with outpatient follow-up.

## 2024-05-26 NOTE — ED NOTES
JUAN contacted CSU, spoke to Nhan who said packet will be reviewed (JUAN faxed). SW asked if patient had his meds with him, denied. Patient nodded negatively when asked if he can get his medications.

## 2024-05-26 NOTE — DISCHARGE INSTRUCTIONS
[facility]  [address] [facility] [place], [place] [id number] [phone]    [facility]  [address] [place] [address] [phone]    [facility]  [address] [place] [address] [phone]  [place] [facility]: [phone]     Homeless Shelters   [facility] [place] [address] [facility], [place] [address] [phone]    [facility]   [address] [place] [address]  Free hot meals Mondays and Tuesday 4PM-6PM sit down dinner at 5PM  Free hot showers Wednesday 1:15-3:45PM    [facility] (men only)  [address] [place], [place] [address] [phone]    [facility]   [phone]    [facility] ([facility])  Women - [phone] ([address] [place] [address])  Men - [phone] ext. [phone] ([address] [place] [address])  Family - [phone] ext. [phone] ([address] [place] [address])    [facility] ([place], [place])  [address] [place] [address] (limited a two day stay if non-resident)  [phone]    [facility] ([place], [place])  [address] [place] [address]  [phone]

## 2024-05-26 NOTE — ED NOTES
assisted patient in calling [facility] provider ride at [phone] and spoke to Julia and provided information for transport.     Patient can be contacted at [phone]     Patient going to his friends at [address] [place] [address]     There is a 2 hour time frame given for transport. Patient will receive a notification 15 prior to arrive and  will only wait for 5 minutes and then ride will be cancelled.     Patient is aware of understands this. Patient requests to wait in the waiting room for ride.     Reference # [id number]

## 2024-05-26 NOTE — ED NOTES
Patient wishes discharge.  Denies SI/ HI or A/V hallucinations.  Discharge instructions reviewed with patient and he denies any questions.

## 2024-05-26 NOTE — ED NOTES
Pt ststaed he wantst o be treated \"for mental health\" and has a hx of Bipolar disorder and does not take his meds. LISA Garcia notified and orders placed. Denies SI/ HI
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
## 2024-05-26 NOTE — ED NOTES
SW received a call from CSU, spoke to Suzie who said patient was denied due to the unit being unable to manage his cognitive needs, autism.

## 2024-05-28 LAB
EKG ATRIAL RATE: 78 BPM
EKG P AXIS: 63 DEGREES
EKG P-R INTERVAL: 164 MS
EKG Q-T INTERVAL: 346 MS
EKG QRS DURATION: 78 MS
EKG QTC CALCULATION (BAZETT): 394 MS
EKG R AXIS: 18 DEGREES
EKG T AXIS: 20 DEGREES
EKG VENTRICULAR RATE: 78 BPM

## 2024-05-28 PROCEDURE — 93010 ELECTROCARDIOGRAM REPORT: CPT | Performed by: INTERNAL MEDICINE

## 2024-08-27 ENCOUNTER — APPOINTMENT (OUTPATIENT)
Dept: GENERAL RADIOLOGY | Age: 19
End: 2024-08-27
Payer: COMMERCIAL

## 2024-08-27 ENCOUNTER — HOSPITAL ENCOUNTER (EMERGENCY)
Age: 19
Discharge: LEFT BEFORE DISCHARGE | End: 2024-08-27
Payer: COMMERCIAL

## 2024-08-27 VITALS
HEART RATE: 98 BPM | DIASTOLIC BLOOD PRESSURE: 69 MMHG | OXYGEN SATURATION: 97 % | TEMPERATURE: 98 F | BODY MASS INDEX: 25.18 KG/M2 | SYSTOLIC BLOOD PRESSURE: 121 MMHG | RESPIRATION RATE: 16 BRPM | HEIGHT: 69 IN | WEIGHT: 170 LBS

## 2024-08-27 DIAGNOSIS — S83.91XA SPRAIN OF RIGHT KNEE, UNSPECIFIED LIGAMENT, INITIAL ENCOUNTER: Primary | ICD-10-CM

## 2024-08-27 PROCEDURE — 73562 X-RAY EXAM OF KNEE 3: CPT

## 2024-08-27 PROCEDURE — 6370000000 HC RX 637 (ALT 250 FOR IP): Performed by: PHYSICIAN ASSISTANT

## 2024-08-27 PROCEDURE — 99283 EMERGENCY DEPT VISIT LOW MDM: CPT

## 2024-08-27 RX ORDER — IBUPROFEN 600 MG/1
600 TABLET, FILM COATED ORAL ONCE
Status: COMPLETED | OUTPATIENT
Start: 2024-08-27 | End: 2024-08-27

## 2024-08-27 RX ORDER — IBUPROFEN 600 MG/1
600 TABLET, FILM COATED ORAL 3 TIMES DAILY PRN
Qty: 30 TABLET | Refills: 0 | Status: SHIPPED | OUTPATIENT
Start: 2024-08-27

## 2024-08-27 RX ADMIN — IBUPROFEN 600 MG: 600 TABLET, FILM COATED ORAL at 21:04

## 2024-08-27 ASSESSMENT — PAIN SCALES - GENERAL
PAINLEVEL_OUTOF10: 7
PAINLEVEL_OUTOF10: 9
PAINLEVEL_OUTOF10: 4

## 2024-08-27 ASSESSMENT — PAIN DESCRIPTION - DESCRIPTORS
DESCRIPTORS: ACHING

## 2024-08-27 ASSESSMENT — PAIN - FUNCTIONAL ASSESSMENT
PAIN_FUNCTIONAL_ASSESSMENT: 0-10
PAIN_FUNCTIONAL_ASSESSMENT: 0-10

## 2024-08-27 ASSESSMENT — PAIN DESCRIPTION - LOCATION
LOCATION: KNEE

## 2024-08-27 ASSESSMENT — PAIN DESCRIPTION - ORIENTATION
ORIENTATION: RIGHT

## 2024-08-27 ASSESSMENT — PAIN DESCRIPTION - FREQUENCY
FREQUENCY: CONTINUOUS
FREQUENCY: CONTINUOUS

## 2024-08-27 ASSESSMENT — PAIN DESCRIPTION - PAIN TYPE
TYPE: ACUTE PAIN
TYPE: ACUTE PAIN

## 2024-08-27 ASSESSMENT — PAIN DESCRIPTION - ONSET
ONSET: ON-GOING
ONSET: ON-GOING

## 2024-08-28 NOTE — ED PROVIDER NOTES
Independent CHRISTOPHER Visit.           [facility] EMERGENCY DEPARTMENT  ED  Encounter Note  Admit Date/RoomTime: 2024  8:46 PM  ED Room: STEVEN/STEVEN  NAME: Luksa Foss  : 2005  MRN: [id number]  PCP: No primary care provider on file.    CHIEF COMPLAINT     Knee Injury (Right knee injury, tripped and landed on it)    HISTORY OF PRESENT ILLNESS        Lukas Foss is a 19 y.o. male who presents to the ED by ambulance for trip and fall, right knee pain, beginning just prior to arrival. The complaint has been persistent and are moderate in severity.  The patient is here today reporting that he tripped and fell in a hole.  States that he twisted his right ankle.  Did not hit his head or lose consciousness.  He was brought in by EMS from [place] complaining of right knee pain.  He did not get anything for pain prior to arrival.  States that he has never injured this knee before.  He was able to put some weight on it.  Denies any pain of the right ankle or hip.  No other complaints or injuries.      REVIEW OF SYSTEMS     Pertinent positives and negatives are stated within HPI, all other systems reviewed and are negative.    Past Medical History:  has a past medical history of ADHD (attention deficit hyperactivity disorder), Autism disorder, Bipolar 1 disorder (HCC), GERD (gastroesophageal reflux disease), Hypotonia, Intermittent explosive disorder, Microcephalic (HCC), and Schizoaffective disorder (HCC).  Surgical History:  has no past surgical history on file.  Social History:  reports that he has quit smoking. His smoking use included cigarettes. He has a 6 pack-year smoking history. He has never used smokeless tobacco. He reports that he does not currently use alcohol after a past usage of about 15.0 standard drinks of alcohol per week. He reports that he does not currently use drugs after having used the following drugs: Marijuana (Weed). Frequency: 7.00 times per week.  Family  KNEE RIGHT (3 VIEWS)   Final Result   Normal exam.             ED COURSE   Vitals:    Vitals:    08/27/24 2048   BP: 121/69   Pulse: 98   Resp: 16   Temp: 98 °F (36.7 °C)   TempSrc: Oral   SpO2: 97%   Weight: 77.1 kg (170 lb)   Height: 1.753 m (5' 9\")       Patient was given the following medications:  Medications   ibuprofen (ADVIL;MOTRIN) tablet 600 mg (600 mg Oral Given 8/27/24 2104)            CONSULTS:  None  DIFFERENTIAL DX_MDM   MDM:   Social Determinants : None    Records Reviewed : None_ n/a per encounter visit    CC/HPI Summary, DDx, ED Course, and Reassessment: Patient presents with Knee Injury (Right knee injury, tripped and landed on it)     The patient is here today after injuring his right knee in a fall.  He arrived via EMS.  There is no obvious trauma or deformity.  X-rays are reassuring with no evidence of acute fracture or acute bony changes.  He has no effusion or gross instability.        The patient was seen leaving the department after telling one of the nurses he did not want to stay for the rest of his results or Ace wrap.  He limped out of the department without treatment complete and without discharge instructions      Plan of Care/Counseling:  Luisa Lynch PA-C reviewed today's visit with the patient in addition to providing specific details for the plan of care and counseling regarding the diagnosis and prognosis.  Questions are answered at this time and are agreeable with the plan.    ASSESSMENT     1. Sprain of right knee, unspecified ligament, initial encounter        DISPOSITION   Discharged home.  Patient condition is stable    Discharge Instructions:   Patient referred to      Call   [phone] to schedule appointment, with [facility] Provider in your area    NEW MEDICATIONS     New Prescriptions    IBUPROFEN (ADVIL;MOTRIN) 600 MG TABLET    Take 1 tablet by mouth 3 times daily as needed for Pain     Electronically signed by Luisa Lynch PA-C   DD: 8/27/24  **This report

## 2024-09-23 ENCOUNTER — HOSPITAL ENCOUNTER (EMERGENCY)
Age: 19
Discharge: HOME | End: 2024-09-23
Payer: COMMERCIAL

## 2024-09-23 ENCOUNTER — APPOINTMENT (OUTPATIENT)
Dept: CT IMAGING | Age: 19
End: 2024-09-23
Payer: COMMERCIAL

## 2024-09-23 VITALS
WEIGHT: 170 LBS | DIASTOLIC BLOOD PRESSURE: 93 MMHG | TEMPERATURE: 98.2 F | RESPIRATION RATE: 18 BRPM | SYSTOLIC BLOOD PRESSURE: 140 MMHG | HEART RATE: 90 BPM | OXYGEN SATURATION: 100 % | HEIGHT: 69 IN | BODY MASS INDEX: 25.18 KG/M2

## 2024-09-23 DIAGNOSIS — S00.83XA FACIAL CONTUSION, INITIAL ENCOUNTER: Primary | ICD-10-CM

## 2024-09-23 DIAGNOSIS — R51.9 RIGHT SIDED FACIAL PAIN: ICD-10-CM

## 2024-09-23 PROCEDURE — 70486 CT MAXILLOFACIAL W/O DYE: CPT

## 2024-09-23 PROCEDURE — 6370000000 HC RX 637 (ALT 250 FOR IP): Performed by: NURSE PRACTITIONER

## 2024-09-23 PROCEDURE — 99284 EMERGENCY DEPT VISIT MOD MDM: CPT

## 2024-09-23 RX ORDER — IBUPROFEN 600 MG/1
600 TABLET, FILM COATED ORAL ONCE
Status: COMPLETED | OUTPATIENT
Start: 2024-09-23 | End: 2024-09-23

## 2024-09-23 RX ADMIN — IBUPROFEN 600 MG: 600 TABLET, FILM COATED ORAL at 19:09

## 2024-09-23 ASSESSMENT — PAIN DESCRIPTION - DESCRIPTORS: DESCRIPTORS: ACHING

## 2024-09-23 ASSESSMENT — PAIN SCALES - GENERAL: PAINLEVEL_OUTOF10: 7

## 2024-09-23 ASSESSMENT — PAIN DESCRIPTION - LOCATION: LOCATION: HEAD

## 2024-09-27 ENCOUNTER — HOSPITAL ENCOUNTER (EMERGENCY)
Age: 19
Discharge: LEFT BEFORE DISCHARGE | End: 2024-09-27
Attending: EMERGENCY MEDICINE
Payer: COMMERCIAL

## 2024-09-27 VITALS
SYSTOLIC BLOOD PRESSURE: 112 MMHG | DIASTOLIC BLOOD PRESSURE: 71 MMHG | HEART RATE: 94 BPM | WEIGHT: 174 LBS | RESPIRATION RATE: 16 BRPM | OXYGEN SATURATION: 97 % | HEIGHT: 69 IN | TEMPERATURE: 98.5 F | BODY MASS INDEX: 25.77 KG/M2

## 2024-09-27 DIAGNOSIS — R42 DIZZINESS: Primary | ICD-10-CM

## 2024-09-27 LAB
EKG ATRIAL RATE: 77 BPM
EKG P AXIS: 66 DEGREES
EKG P-R INTERVAL: 156 MS
EKG Q-T INTERVAL: 354 MS
EKG QRS DURATION: 84 MS
EKG QTC CALCULATION (BAZETT): 400 MS
EKG R AXIS: 39 DEGREES
EKG T AXIS: 26 DEGREES
EKG VENTRICULAR RATE: 77 BPM

## 2024-09-27 PROCEDURE — 99283 EMERGENCY DEPT VISIT LOW MDM: CPT

## 2024-09-27 PROCEDURE — 93005 ELECTROCARDIOGRAM TRACING: CPT | Performed by: EMERGENCY MEDICINE

## 2024-09-27 PROCEDURE — 6370000000 HC RX 637 (ALT 250 FOR IP): Performed by: EMERGENCY MEDICINE

## 2024-09-27 RX ORDER — 0.9 % SODIUM CHLORIDE 0.9 %
1000 INTRAVENOUS SOLUTION INTRAVENOUS ONCE
Status: DISCONTINUED | OUTPATIENT
Start: 2024-09-27 | End: 2024-09-28 | Stop reason: HOSPADM

## 2024-09-27 RX ORDER — MECLIZINE HCL 12.5 MG 12.5 MG/1
25 TABLET ORAL ONCE
Status: COMPLETED | OUTPATIENT
Start: 2024-09-27 | End: 2024-09-27

## 2024-09-27 RX ADMIN — MECLIZINE 25 MG: 12.5 TABLET ORAL at 22:13

## 2024-09-27 ASSESSMENT — PAIN - FUNCTIONAL ASSESSMENT: PAIN_FUNCTIONAL_ASSESSMENT: NONE - DENIES PAIN

## 2024-09-28 NOTE — ED PROVIDER NOTES
HPI:  9/27/24,   Time: 10:28 PM EDT         Lukas Foss is a 19 y.o. male presenting to the ED for dizziness, beginning prior to arrival ago.  The complaint has been persistent, moderate in severity, and worsened by nothing.  Patient is asymptomatic right now his any score is a 0 he is refusing all workup and evaluation his vital signs are stable he is can leave AGAINST MEDICAL ADVICE his physical exam is normal once again his vital signs are stable he is alert oriented x 3 he is competent sober and stable to make decision to leave AGAINST MEDICAL ADVICE.   This patient has chosen to leave against medical advice.  I have personally explained to them that choosing to do so may result in permanent bodily harm or death.  I discussed at length that without further evaluation and monitoring there may be unforeseen circumstances and deterioration resulting in permanent bodily harm or death as a result of their choice.    They are alert, oriented, and competent at this time.  They state that they are aware of the serious risks as explained, but they continue to wish to leave against medical advice.    In light of their decision to leave against medical advice, follow-up has been arranged and they are aware of the importance of following up as instructed.  They have been advised that they should return to the ED immediately if they change their mind at any time, or if their condition begins to change or worsen.         ROS:   Pertinent positives and negatives are stated within HPI, all other systems reviewed and are negative.  --------------------------------------------- PAST HISTORY ---------------------------------------------  Past Medical History:  has a past medical history of ADHD (attention deficit hyperactivity disorder), Autism disorder, Bipolar 1 disorder (HCC), GERD (gastroesophageal reflux disease), Hypotonia, Intermittent explosive disorder, Microcephalic (HCC), and Schizoaffective disorder

## 2024-09-28 NOTE — ED NOTES
Discharge instructions reviewed , including diagnosis, medications, follow up appointments, home care, and also when to call 911.  All discharge instructions questions answered.     AMA, reports \"I am going back to work\"    Pt left ED ambulatory

## 2024-09-30 PROCEDURE — 93010 ELECTROCARDIOGRAM REPORT: CPT | Performed by: INTERNAL MEDICINE

## 2024-11-03 ENCOUNTER — HOSPITAL ENCOUNTER (EMERGENCY)
Age: 19
Discharge: HOME | End: 2024-11-04
Attending: EMERGENCY MEDICINE
Payer: COMMERCIAL

## 2024-11-03 ENCOUNTER — APPOINTMENT (OUTPATIENT)
Dept: GENERAL RADIOLOGY | Age: 19
End: 2024-11-03
Payer: COMMERCIAL

## 2024-11-03 VITALS
HEIGHT: 69 IN | WEIGHT: 160 LBS | OXYGEN SATURATION: 99 % | SYSTOLIC BLOOD PRESSURE: 126 MMHG | RESPIRATION RATE: 16 BRPM | DIASTOLIC BLOOD PRESSURE: 77 MMHG | BODY MASS INDEX: 23.7 KG/M2 | TEMPERATURE: 97.9 F | HEART RATE: 72 BPM

## 2024-11-03 DIAGNOSIS — R07.9 CHEST PAIN, UNSPECIFIED TYPE: Primary | ICD-10-CM

## 2024-11-03 LAB
ALBUMIN SERPL-MCNC: 4.3 G/DL (ref 3.5–5.2)
ALP SERPL-CCNC: 96 U/L (ref 40–129)
ALT SERPL-CCNC: 14 U/L (ref 0–40)
AMPHET UR QL SCN: NEGATIVE
ANION GAP SERPL CALCULATED.3IONS-SCNC: 11 MMOL/L (ref 7–16)
AST SERPL-CCNC: 23 U/L (ref 0–39)
BARBITURATES UR QL SCN: NEGATIVE
BASOPHILS # BLD: 0.04 K/UL (ref 0–0.2)
BASOPHILS NFR BLD: 1 % (ref 0–2)
BENZODIAZ UR QL: NEGATIVE
BILIRUB SERPL-MCNC: 0.4 MG/DL (ref 0–1.2)
BUN SERPL-MCNC: 8 MG/DL (ref 6–20)
BUPRENORPHINE UR QL: NEGATIVE
CALCIUM SERPL-MCNC: 9 MG/DL (ref 8.6–10.2)
CANNABINOIDS UR QL SCN: POSITIVE
CHLORIDE SERPL-SCNC: 102 MMOL/L (ref 98–107)
CO2 SERPL-SCNC: 24 MMOL/L (ref 22–29)
COCAINE UR QL SCN: NEGATIVE
CREAT SERPL-MCNC: 0.9 MG/DL (ref 0.7–1.2)
EOSINOPHIL # BLD: 0.87 K/UL (ref 0.05–0.5)
EOSINOPHILS RELATIVE PERCENT: 12 % (ref 0–6)
ERYTHROCYTE [DISTWIDTH] IN BLOOD BY AUTOMATED COUNT: 12.3 % (ref 11.5–15)
FENTANYL UR QL: NEGATIVE
GFR, ESTIMATED: >90 ML/MIN/1.73M2
GLUCOSE SERPL-MCNC: 98 MG/DL (ref 74–99)
HCT VFR BLD AUTO: 49.5 % (ref 37–54)
HGB BLD-MCNC: 16.4 G/DL (ref 12.5–16.5)
IMM GRANULOCYTES # BLD AUTO: <0.03 K/UL (ref 0–0.58)
IMM GRANULOCYTES NFR BLD: 0 % (ref 0–5)
LYMPHOCYTES NFR BLD: 2.2 K/UL (ref 1.5–4)
LYMPHOCYTES RELATIVE PERCENT: 31 % (ref 20–42)
MCH RBC QN AUTO: 29.2 PG (ref 26–35)
MCHC RBC AUTO-ENTMCNC: 33.1 G/DL (ref 32–34.5)
MCV RBC AUTO: 88.2 FL (ref 80–99.9)
METHADONE UR QL: NEGATIVE
MONOCYTES NFR BLD: 0.59 K/UL (ref 0.1–0.95)
MONOCYTES NFR BLD: 8 % (ref 2–12)
NEUTROPHILS NFR BLD: 48 % (ref 43–80)
NEUTS SEG NFR BLD: 3.43 K/UL (ref 1.8–7.3)
OPIATES UR QL SCN: NEGATIVE
OXYCODONE UR QL SCN: NEGATIVE
PCP UR QL SCN: NEGATIVE
PLATELET # BLD AUTO: 266 K/UL (ref 130–450)
PMV BLD AUTO: 9 FL (ref 7–12)
POTASSIUM SERPL-SCNC: 4.3 MMOL/L (ref 3.5–5)
PROT SERPL-MCNC: 6.6 G/DL (ref 6.4–8.3)
RBC # BLD AUTO: 5.61 M/UL (ref 3.8–5.8)
SODIUM SERPL-SCNC: 137 MMOL/L (ref 132–146)
TEST INFORMATION: ABNORMAL
TROPONIN I SERPL HS-MCNC: 17 NG/L (ref 0–11)
TROPONIN I SERPL HS-MCNC: 7 NG/L (ref 0–11)
TROPONIN I SERPL HS-MCNC: 7 NG/L (ref 0–11)
WBC OTHER # BLD: 7.1 K/UL (ref 4.5–11.5)

## 2024-11-03 PROCEDURE — 93005 ELECTROCARDIOGRAM TRACING: CPT | Performed by: EMERGENCY MEDICINE

## 2024-11-03 PROCEDURE — 71046 X-RAY EXAM CHEST 2 VIEWS: CPT

## 2024-11-03 PROCEDURE — 80307 DRUG TEST PRSMV CHEM ANLYZR: CPT

## 2024-11-03 PROCEDURE — 85025 COMPLETE CBC W/AUTO DIFF WBC: CPT

## 2024-11-03 PROCEDURE — 6370000000 HC RX 637 (ALT 250 FOR IP): Performed by: EMERGENCY MEDICINE

## 2024-11-03 PROCEDURE — 84484 ASSAY OF TROPONIN QUANT: CPT

## 2024-11-03 PROCEDURE — 80053 COMPREHEN METABOLIC PANEL: CPT

## 2024-11-03 PROCEDURE — 99285 EMERGENCY DEPT VISIT HI MDM: CPT

## 2024-11-03 RX ORDER — KETOROLAC TROMETHAMINE 30 MG/ML
30 INJECTION, SOLUTION INTRAMUSCULAR; INTRAVENOUS ONCE
Status: DISCONTINUED | OUTPATIENT
Start: 2024-11-03 | End: 2024-11-03

## 2024-11-03 RX ORDER — IBUPROFEN 400 MG/1
400 TABLET, FILM COATED ORAL ONCE
Status: COMPLETED | OUTPATIENT
Start: 2024-11-03 | End: 2024-11-03

## 2024-11-03 RX ADMIN — IBUPROFEN 400 MG: 400 TABLET, FILM COATED ORAL at 21:27

## 2024-11-03 ASSESSMENT — PAIN SCALES - GENERAL
PAINLEVEL_OUTOF10: 0
PAINLEVEL_OUTOF10: 10

## 2024-11-03 ASSESSMENT — PAIN - FUNCTIONAL ASSESSMENT
PAIN_FUNCTIONAL_ASSESSMENT: 0-10
PAIN_FUNCTIONAL_ASSESSMENT: 0-10

## 2024-11-03 ASSESSMENT — PAIN DESCRIPTION - LOCATION: LOCATION: CHEST

## 2024-11-04 LAB
EKG ATRIAL RATE: 63 BPM
EKG ATRIAL RATE: 86 BPM
EKG P AXIS: 52 DEGREES
EKG P AXIS: 68 DEGREES
EKG P-R INTERVAL: 140 MS
EKG P-R INTERVAL: 148 MS
EKG Q-T INTERVAL: 330 MS
EKG Q-T INTERVAL: 372 MS
EKG QRS DURATION: 78 MS
EKG QRS DURATION: 78 MS
EKG QTC CALCULATION (BAZETT): 380 MS
EKG QTC CALCULATION (BAZETT): 394 MS
EKG R AXIS: 56 DEGREES
EKG R AXIS: 59 DEGREES
EKG T AXIS: 40 DEGREES
EKG T AXIS: 53 DEGREES
EKG VENTRICULAR RATE: 63 BPM
EKG VENTRICULAR RATE: 86 BPM

## 2024-11-04 PROCEDURE — 93010 ELECTROCARDIOGRAM REPORT: CPT | Performed by: INTERNAL MEDICINE

## 2024-11-04 NOTE — ED PROVIDER NOTES
CHEST (2 VW)   Final Result   No acute process.               RADIOLOGY:   Non-plain film images such as CT, Ultrasound and MRI are read by the radiologist. Plain radiographic images are visualized and preliminarily interpreted by the ED Provider       Interpretation per the Radiologist below, if available at the time of this note:    XR CHEST (2 VW)   Final Result   No acute process.           No results found.    No results found.    PROCEDURES   Unless otherwise noted below, none       MDM:   Dr. Eduardo TURNER am the primary physician of record.    Lukas Foss is a 19 y.o. male who presents to the ED for chest pain  Vital signs upon arrival /76   Pulse 92   Temp 97.9 °F (36.6 °C) (Temporal)   Resp 16   Ht 1.753 m (5' 9\")   Wt 72.6 kg (160 lb)   SpO2 99%   BMI 23.63 kg/m²     History/physical examination/differential diagnosis/Labs and imaging  Patient presents emergency department the chief complaint of chest pain.  Patient sitting in the bed no acute distress, heart regular rate and rhythm, lungs clear to auscultation bilaterally.  Differential diagnose includes but not limited to MI, ACS, pneumonia, pneumothorax, pulmonary embolus.  The patient is PERC negative.  The patient did have labs and imaging which were reviewed by me demonstrating CBC, CMP fairly unremarkable, high-sensitivity troponin was 7 and then up trended to 17 but was repeated normal at 7, suspect this likely lab error.  Chest x-ray demonstrates no acute process.  Results of today were discussed with the patient.  The patient was treated symptomatically.  He will be discharged he will follow-up outpatient    Chronic conditions:   Past Medical History:   Diagnosis Date    ADHD (attention deficit hyperactivity disorder)     Autism disorder     Bipolar 1 disorder (HCC)     GERD (gastroesophageal reflux disease)     Hypotonia     Intermittent explosive disorder     Microcephalic (HCC)     Schizoaffective disorder (HCC)

## 2024-11-04 NOTE — ED NOTES
Patient refused to let me keep an IV in his arm after blood work. I explained to him that he would more than likely need a repeat Troponin drawn and has IV medication ordered. He still refused.

## 2024-11-05 ENCOUNTER — HOSPITAL ENCOUNTER (EMERGENCY)
Age: 19
Discharge: PSYCHIATRIC OR SUBSTANCE USE FACILITY | End: 2024-11-06
Attending: EMERGENCY MEDICINE
Payer: COMMERCIAL

## 2024-11-05 DIAGNOSIS — F39 MOOD DISORDER (HCC): ICD-10-CM

## 2024-11-05 DIAGNOSIS — R07.89 ATYPICAL CHEST PAIN: Primary | ICD-10-CM

## 2024-11-05 PROCEDURE — 84484 ASSAY OF TROPONIN QUANT: CPT

## 2024-11-05 PROCEDURE — 99285 EMERGENCY DEPT VISIT HI MDM: CPT

## 2024-11-05 PROCEDURE — 85379 FIBRIN DEGRADATION QUANT: CPT

## 2024-11-05 PROCEDURE — 80053 COMPREHEN METABOLIC PANEL: CPT

## 2024-11-05 PROCEDURE — 85025 COMPLETE CBC W/AUTO DIFF WBC: CPT

## 2024-11-06 ENCOUNTER — APPOINTMENT (OUTPATIENT)
Dept: GENERAL RADIOLOGY | Age: 19
End: 2024-11-06
Payer: COMMERCIAL

## 2024-11-06 VITALS
SYSTOLIC BLOOD PRESSURE: 131 MMHG | RESPIRATION RATE: 17 BRPM | TEMPERATURE: 98.5 F | WEIGHT: 160 LBS | BODY MASS INDEX: 23.7 KG/M2 | HEART RATE: 91 BPM | HEIGHT: 69 IN | OXYGEN SATURATION: 96 % | DIASTOLIC BLOOD PRESSURE: 77 MMHG

## 2024-11-06 LAB
ALBUMIN SERPL-MCNC: 4.3 G/DL (ref 3.5–5.2)
ALP SERPL-CCNC: 92 U/L (ref 40–129)
ALT SERPL-CCNC: 11 U/L (ref 0–40)
AMPHET UR QL SCN: NEGATIVE
ANION GAP SERPL CALCULATED.3IONS-SCNC: 10 MMOL/L (ref 7–16)
APAP SERPL-MCNC: <5 UG/ML (ref 10–30)
AST SERPL-CCNC: 18 U/L (ref 0–39)
BARBITURATES UR QL SCN: NEGATIVE
BASOPHILS # BLD: 0.04 K/UL (ref 0–0.2)
BASOPHILS NFR BLD: 1 % (ref 0–2)
BENZODIAZ UR QL: NEGATIVE
BILIRUB SERPL-MCNC: 0.3 MG/DL (ref 0–1.2)
BUN SERPL-MCNC: 11 MG/DL (ref 6–20)
BUPRENORPHINE UR QL: NEGATIVE
CALCIUM SERPL-MCNC: 8.8 MG/DL (ref 8.6–10.2)
CANNABINOIDS UR QL SCN: POSITIVE
CHLORIDE SERPL-SCNC: 99 MMOL/L (ref 98–107)
CO2 SERPL-SCNC: 26 MMOL/L (ref 22–29)
COCAINE UR QL SCN: NEGATIVE
CREAT SERPL-MCNC: 0.9 MG/DL (ref 0.7–1.2)
D-DIMER QUANTITATIVE: <200 NG/ML DDU (ref 0–230)
EKG ATRIAL RATE: 75 BPM
EKG P AXIS: 56 DEGREES
EKG P-R INTERVAL: 140 MS
EKG Q-T INTERVAL: 366 MS
EKG QRS DURATION: 94 MS
EKG QTC CALCULATION (BAZETT): 408 MS
EKG R AXIS: 36 DEGREES
EKG T AXIS: 6 DEGREES
EKG VENTRICULAR RATE: 75 BPM
EOSINOPHIL # BLD: 0.6 K/UL (ref 0.05–0.5)
EOSINOPHILS RELATIVE PERCENT: 8 % (ref 0–6)
ERYTHROCYTE [DISTWIDTH] IN BLOOD BY AUTOMATED COUNT: 12.1 % (ref 11.5–15)
ETHANOLAMINE SERPL-MCNC: <10 MG/DL (ref 0–0.08)
FENTANYL UR QL: NEGATIVE
GFR, ESTIMATED: >90 ML/MIN/1.73M2
GLUCOSE SERPL-MCNC: 100 MG/DL (ref 74–99)
HCT VFR BLD AUTO: 49.6 % (ref 37–54)
HGB BLD-MCNC: 16.5 G/DL (ref 12.5–16.5)
IMM GRANULOCYTES # BLD AUTO: 0.03 K/UL (ref 0–0.58)
IMM GRANULOCYTES NFR BLD: 0 % (ref 0–5)
LYMPHOCYTES NFR BLD: 2.7 K/UL (ref 1.5–4)
LYMPHOCYTES RELATIVE PERCENT: 35 % (ref 20–42)
MCH RBC QN AUTO: 29.1 PG (ref 26–35)
MCHC RBC AUTO-ENTMCNC: 33.3 G/DL (ref 32–34.5)
MCV RBC AUTO: 87.5 FL (ref 80–99.9)
METHADONE UR QL: NEGATIVE
MONOCYTES NFR BLD: 0.55 K/UL (ref 0.1–0.95)
MONOCYTES NFR BLD: 7 % (ref 2–12)
NEUTROPHILS NFR BLD: 49 % (ref 43–80)
NEUTS SEG NFR BLD: 3.78 K/UL (ref 1.8–7.3)
OPIATES UR QL SCN: NEGATIVE
OXYCODONE UR QL SCN: NEGATIVE
PCP UR QL SCN: NEGATIVE
PLATELET # BLD AUTO: 275 K/UL (ref 130–450)
PMV BLD AUTO: 9.3 FL (ref 7–12)
POTASSIUM SERPL-SCNC: 3.6 MMOL/L (ref 3.5–5)
PROT SERPL-MCNC: 6.6 G/DL (ref 6.4–8.3)
RBC # BLD AUTO: 5.67 M/UL (ref 3.8–5.8)
SALICYLATES SERPL-MCNC: <0.3 MG/DL (ref 0–30)
SODIUM SERPL-SCNC: 135 MMOL/L (ref 132–146)
TEST INFORMATION: ABNORMAL
TOXIC TRICYCLIC SC,BLOOD: NEGATIVE
TROPONIN I SERPL HS-MCNC: 6 NG/L (ref 0–11)
WBC OTHER # BLD: 7.7 K/UL (ref 4.5–11.5)

## 2024-11-06 PROCEDURE — 96372 THER/PROPH/DIAG INJ SC/IM: CPT

## 2024-11-06 PROCEDURE — 93005 ELECTROCARDIOGRAM TRACING: CPT | Performed by: EMERGENCY MEDICINE

## 2024-11-06 PROCEDURE — 6360000002 HC RX W HCPCS

## 2024-11-06 PROCEDURE — G0480 DRUG TEST DEF 1-7 CLASSES: HCPCS

## 2024-11-06 PROCEDURE — 80143 DRUG ASSAY ACETAMINOPHEN: CPT

## 2024-11-06 PROCEDURE — 6370000000 HC RX 637 (ALT 250 FOR IP): Performed by: EMERGENCY MEDICINE

## 2024-11-06 PROCEDURE — 71045 X-RAY EXAM CHEST 1 VIEW: CPT

## 2024-11-06 PROCEDURE — 6360000002 HC RX W HCPCS: Performed by: EMERGENCY MEDICINE

## 2024-11-06 PROCEDURE — 80179 DRUG ASSAY SALICYLATE: CPT

## 2024-11-06 PROCEDURE — 80307 DRUG TEST PRSMV CHEM ANLYZR: CPT

## 2024-11-06 PROCEDURE — 93010 ELECTROCARDIOGRAM REPORT: CPT | Performed by: INTERNAL MEDICINE

## 2024-11-06 RX ORDER — LORAZEPAM 2 MG/ML
2 INJECTION INTRAMUSCULAR ONCE
Status: COMPLETED | OUTPATIENT
Start: 2024-11-06 | End: 2024-11-06

## 2024-11-06 RX ORDER — DROPERIDOL 2.5 MG/ML
5 INJECTION, SOLUTION INTRAMUSCULAR; INTRAVENOUS ONCE
Status: COMPLETED | OUTPATIENT
Start: 2024-11-06 | End: 2024-11-06

## 2024-11-06 RX ORDER — HALOPERIDOL 5 MG/ML
10 INJECTION INTRAMUSCULAR ONCE
Status: COMPLETED | OUTPATIENT
Start: 2024-11-06 | End: 2024-11-06

## 2024-11-06 RX ADMIN — LIDOCAINE HYDROCHLORIDE: 20 SOLUTION ORAL at 00:07

## 2024-11-06 RX ADMIN — DROPERIDOL 5 MG: 2.5 INJECTION, SOLUTION INTRAMUSCULAR; INTRAVENOUS at 10:44

## 2024-11-06 RX ADMIN — HALOPERIDOL LACTATE 10 MG: 5 INJECTION, SOLUTION INTRAMUSCULAR at 21:20

## 2024-11-06 RX ADMIN — LORAZEPAM 2 MG: 2 INJECTION INTRAMUSCULAR; INTRAVENOUS at 21:20

## 2024-11-06 RX ADMIN — LORAZEPAM 2 MG: 2 INJECTION INTRAMUSCULAR; INTRAVENOUS at 10:45

## 2024-11-06 ASSESSMENT — PAIN - FUNCTIONAL ASSESSMENT: PAIN_FUNCTIONAL_ASSESSMENT: NONE - DENIES PAIN

## 2024-11-06 NOTE — ED NOTES
SW was contacted by [facility]     Accepting doctor : Dr. Navarro  Location: Adult unit [address]B  Report # [phone]

## 2024-11-06 NOTE — ED NOTES
JUAN spoke with Patti from [facility]. Confirms previous admitting information and reports that [facility] will be providing transportation with a 3-4 hour ETA

## 2024-11-06 NOTE — ED NOTES
Pt still unable to give urine sample, pt given few glasses fresh ice water, this nurse encouraged pt to try again few minutes, specimen container with pt.

## 2024-11-06 NOTE — ED NOTES
Patient denying SI/HI at this time.  States that he does not want to be admitted here.  States that he would like to be referred out to another facility.  Provider notified.

## 2024-11-06 NOTE — ED NOTES
PT NOW STANDING AT DESK RIPPING UP HIS INVOLUNTARY HOLD COPY THREATENING AND YELLING [employer] OFFICERS NOTIFIED

## 2024-11-06 NOTE — ED PROVIDER NOTES
with admission.    Consultations:               Telepsych  Critical Care:         This patient's ED course included: a personal history and physicial eaxmination    This patient has been closely monitored during their ED course.    Counseling:   The emergency provider has spoken with the patient and discussed today’s results, in addition to providing specific details for the plan of care and counseling regarding the diagnosis and prognosis.  Questions are answered at this time and they are agreeable with the plan.       --------------------------------- IMPRESSION AND DISPOSITION ---------------------------------    IMPRESSION  1. Atypical chest pain    2. Mood disorder (HCC)        DISPOSITION  Disposition: To be eval by telepsych  Patient condition is stable        NOTE: This report was transcribed using voice recognition software. Every effort was made to ensure accuracy; however, inadvertent computerized transcription errors may be present          Russel Arias MD  11/06/24 0043       Russel Arias MD  11/06/24 0117       Russel Arias MD  11/06/24 0221

## 2024-11-06 NOTE — ED NOTES
PT BEGAN REQUESTING TO LEAVE STATING HE IS NOT ON AN INVOLUNTARY HOLD.  THIS RN TRIED TO INFORM THE PT OF PLAN OF CARE HOWEVER PT BECOMES BELLIGERENT AND CALLIING THIS RN \"BITCH, I SHOULD HAVE LEFT ALONG TIME AGO.\"  DR AWARE OF PT'S REQUEST AWAITNG ORDERS.

## 2024-11-06 NOTE — ED NOTES
JUAN was contacted by Roselyn from [facility] to see if the pink slip had been faxed, JUAN confirmed it was. SW faxed pink slip again.

## 2024-11-06 NOTE — VIRTUAL HEALTH
however should he change his mind he does meet standards for involuntary inpatient psychiatric treatment.    Dx:   Suicidal Ideation    Plan:  Inpatient psychiatric admission at appropriate care level facility, once medically cleared and stable and The patient is agreeable to voluntary psychiatric admission.  Legal Status: VOLUNTARY.  Patient is suicidal - risk of harm to self.   Sitter, suicide and elopement precautions.  Medical co-morbidities: Management per medical providers, appreciate assistance.  Reviewed treatment plan with patient including risks, benefits, alternatives, and side effects of medications, and any/all black box warnings. Patient verbalized understanding.  Patient had an opportunity to ask questions and address concerns. Obtained informed consent for treatment.  Medical records, labs, and diagnostic tests reviewed.   Re-consult for any new changes or concerns. Thank you for this consult.  Discussed recommendations with Dr. Arias at time of consult completion.    TelePsych recommendations:Inpatient psychiatric admission    Legal hold: No Involuntary Hold    Telepsychiatry will sign off. Thank you for allowing us to participate in the care of this patient. Please send message or call via [employer] if anything more is required.     Electronically signed by NAN Caal CNP on 11/6/2024 at 1:50 AM.    END OF NOTE  -------------------------                          Lukas Foss, was evaluated through a synchronous (real-time) audio-video encounter. The patient (and/or guardian if applicable) is aware that this is a billable service, which includes applicable co-pays. This virtual visit was conducted with patient's (and/or legal guardian's) consent. Patient identification was verified, and a caregiver was present when appropriate.  The patient was located at Facility (Appt Department): [facility]

## 2024-11-07 NOTE — ED NOTES
Report given to [facility] crew. PCA removed all patient's belongings from locker and gave items to ems crew.

## 2024-11-07 NOTE — ED NOTES
Nurse to nurse report given to BETINA Hill at [facility] [phone]. BETINA Hill notified patient received Haldol 10mg and Ativan 2mg at 2120 and she stated that she will accept patient now at this time after patient was able to ambulate to ems stretcher on his own.

## 2024-11-07 NOTE — ED NOTES
Patient laying on the ground kicking bed, fighting the bed and WALL   I have reviewed and confirmed nurses' notes...
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
## 2024-11-07 NOTE — ED NOTES
PT is verbally upset arguing with the . Not wanting [facility] due to no smoking. States he will go back to shelter

## 2024-11-07 NOTE — ED NOTES
EMTALA form was signed by current ED attending Dr. Arias and this RN. Other tranfers paperwork previously completed by . All paperwork given to ems crew.

## 2024-11-07 NOTE — ED NOTES
Received report from BETINA Campos. [facility] here for patient. Physician's crew stated that [facility] may not take patient now due to patient being medicated. Will call facility.

## 2024-11-15 ENCOUNTER — HOSPITAL ENCOUNTER (EMERGENCY)
Age: 19
Discharge: HOME | End: 2024-11-15
Payer: COMMERCIAL

## 2024-11-15 ENCOUNTER — APPOINTMENT (OUTPATIENT)
Dept: GENERAL RADIOLOGY | Age: 19
End: 2024-11-15
Payer: COMMERCIAL

## 2024-11-15 VITALS
HEIGHT: 69 IN | RESPIRATION RATE: 18 BRPM | BODY MASS INDEX: 23.7 KG/M2 | SYSTOLIC BLOOD PRESSURE: 96 MMHG | DIASTOLIC BLOOD PRESSURE: 69 MMHG | TEMPERATURE: 98.3 F | HEART RATE: 94 BPM | OXYGEN SATURATION: 97 % | WEIGHT: 160 LBS

## 2024-11-15 DIAGNOSIS — M25.571 ACUTE RIGHT ANKLE PAIN: Primary | ICD-10-CM

## 2024-11-15 PROCEDURE — 73610 X-RAY EXAM OF ANKLE: CPT

## 2024-11-15 PROCEDURE — 99283 EMERGENCY DEPT VISIT LOW MDM: CPT

## 2024-11-15 PROCEDURE — 73630 X-RAY EXAM OF FOOT: CPT

## 2024-11-15 ASSESSMENT — PAIN SCALES - GENERAL: PAINLEVEL_OUTOF10: 10

## 2024-11-15 ASSESSMENT — PAIN - FUNCTIONAL ASSESSMENT: PAIN_FUNCTIONAL_ASSESSMENT: 0-10
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
## 2024-11-15 NOTE — ED PROVIDER NOTES
Independent CHRISTOPHER Visit.        HPI:  11/15/24, Time: 2:27 AM ANTHONY Foss is a 19 y.o. male presenting to the ED for right ankle and foot pain, beginning several hours ago while walking outside in the rain.  The complaint has been persistent, mild in severity, and worsened by standing, walking.  Denies any particular injury prior to the onset of symptoms.  Denies any prior fractures or surgeries to this area.  Reports the pain is diffusely about the right ankle does not radiate.  No numbness or tingling to the right ankle or foot.  Afebrile without recent travel or sick contacts.  Patient denies all other symptoms at this time.    Review of Systems:   A complete review of systems was performed and pertinent positives and negatives are stated within HPI, all other systems reviewed and are negative.          --------------------------------------------- PAST HISTORY ---------------------------------------------  Past Medical History:  has a past medical history of ADHD (attention deficit hyperactivity disorder), Autism disorder, Bipolar 1 disorder (HCC), GERD (gastroesophageal reflux disease), Hypotonia, Intermittent explosive disorder, Microcephalic (HCC), and Schizoaffective disorder ([facility]).    Past Surgical History:  has no past surgical history on file.    Social History:  reports that he has quit smoking. His smoking use included cigarettes. He has a 6 pack-year smoking history. He has never used smokeless tobacco. He reports that he does not currently use alcohol after a past usage of about 15.0 standard drinks of alcohol per week. He reports that he does not currently use drugs after having used the following drugs: Marijuana (Weed). Frequency: 7.00 times per week.    Family History: family history is not on file.     The patient’s home medications have been reviewed.    Allergies: Strawberry    -------------------------------------------------- RESULTS

## 2024-11-27 ENCOUNTER — HOSPITAL ENCOUNTER (EMERGENCY)
Age: 19
Discharge: LEFT BEFORE DISCHARGE | End: 2024-11-28
Payer: COMMERCIAL

## 2024-11-27 VITALS
RESPIRATION RATE: 18 BRPM | OXYGEN SATURATION: 98 % | SYSTOLIC BLOOD PRESSURE: 123 MMHG | DIASTOLIC BLOOD PRESSURE: 60 MMHG | HEART RATE: 73 BPM | TEMPERATURE: 98.3 F

## 2024-11-27 DIAGNOSIS — Z53.29 LEFT AGAINST MEDICAL ADVICE: Primary | ICD-10-CM

## 2024-11-27 DIAGNOSIS — R07.89 ATYPICAL CHEST PAIN: ICD-10-CM

## 2024-11-27 DIAGNOSIS — R51.9 ACUTE NONINTRACTABLE HEADACHE, UNSPECIFIED HEADACHE TYPE: ICD-10-CM

## 2024-11-27 PROCEDURE — 99283 EMERGENCY DEPT VISIT LOW MDM: CPT

## 2024-11-28 ENCOUNTER — HOSPITAL ENCOUNTER (EMERGENCY)
Age: 19
Discharge: HOME | End: 2024-11-29
Attending: EMERGENCY MEDICINE
Payer: COMMERCIAL

## 2024-11-28 VITALS
SYSTOLIC BLOOD PRESSURE: 139 MMHG | DIASTOLIC BLOOD PRESSURE: 82 MMHG | HEART RATE: 92 BPM | TEMPERATURE: 97.2 F | RESPIRATION RATE: 17 BRPM | OXYGEN SATURATION: 98 %

## 2024-11-28 DIAGNOSIS — F32.A DEPRESSION, UNSPECIFIED DEPRESSION TYPE: Primary | ICD-10-CM

## 2024-11-28 PROCEDURE — 80053 COMPREHEN METABOLIC PANEL: CPT

## 2024-11-28 PROCEDURE — 80307 DRUG TEST PRSMV CHEM ANLYZR: CPT

## 2024-11-28 PROCEDURE — 80179 DRUG ASSAY SALICYLATE: CPT

## 2024-11-28 PROCEDURE — 80143 DRUG ASSAY ACETAMINOPHEN: CPT

## 2024-11-28 PROCEDURE — 6370000000 HC RX 637 (ALT 250 FOR IP)

## 2024-11-28 PROCEDURE — G0480 DRUG TEST DEF 1-7 CLASSES: HCPCS

## 2024-11-28 PROCEDURE — 85025 COMPLETE CBC W/AUTO DIFF WBC: CPT

## 2024-11-28 PROCEDURE — 93005 ELECTROCARDIOGRAM TRACING: CPT

## 2024-11-28 RX ORDER — IBUPROFEN 600 MG/1
600 TABLET, FILM COATED ORAL ONCE
Status: COMPLETED | OUTPATIENT
Start: 2024-11-28 | End: 2024-11-28

## 2024-11-28 RX ADMIN — IBUPROFEN 600 MG: 600 TABLET, FILM COATED ORAL at 00:26

## 2024-11-28 ASSESSMENT — PAIN SCALES - GENERAL: PAINLEVEL_OUTOF10: 4

## 2024-11-28 ASSESSMENT — PAIN DESCRIPTION - DESCRIPTORS: DESCRIPTORS: DISCOMFORT

## 2024-11-28 ASSESSMENT — PAIN DESCRIPTION - LOCATION: LOCATION: CHEST

## 2024-11-28 ASSESSMENT — PAIN DESCRIPTION - ORIENTATION: ORIENTATION: LEFT

## 2024-11-28 NOTE — ED PROVIDER NOTES
anterior upper chest discomfort described as sharp beginning shortly prior to arrival.  The pain does not  radiate to neck, back or abdomen.  His symptoms are associated with a gradual onset of a mild frontal pressure-like headache.   No tearing sensation, numbness, or weakness. The symptoms are worsened by nothing in particular and relieved by nothing.  He has not taken anything for his symptoms.  There has been no fever, chills, cough, congestion, runny nose, sore throat, wheezing, shortness of breath, palpitations, syncope, abdominal pain, nausea, vomiting, back pain, calf swelling, or leg pain associated with complaint.   He takes no blood thinning agents. Record Review: ED visit note from 11/24/2024 [facility] for chest pain reviewed. On initial exam, he is well-appearing in no distress with normal vital signs.  There is no pallor or diaphoresis.  Heart sounds normal.  Lungs clear. Patient was given:   Medications   ibuprofen (ADVIL;MOTRIN) tablet 600 mg (600 mg Oral Given 11/28/24 0026)    and their symptoms are improving.  Diagnostics were ordered including delta troponin and EKG.  Patient refused lab draw, but did allow EKG which showed no significant acute changes and was reassuring    I utilized an evidence-based risk rating tool (CMT) along with my training and experience to weigh the risk of discharge against the risks of further testing, imaging, or hospitalization. At this time I estimate the risks of additional testing, imaging, or hospitalization to be equal to or greater than the risk of discharge. I discussed my risk assessment with the patient and the patient consents to the risk of discharge as well as the risk of uncertainty in estimating outcomes.           Chest pain is not concerning for cardiac chest pain.  PERC criteria negative which makes him low risk for PE.  No cough, fever, or hypoxia to suggest PNA evaluation limited due to patient's refusal for 5 additional

## 2024-11-29 LAB
ALBUMIN SERPL-MCNC: 4.2 G/DL (ref 3.5–5.2)
ALP SERPL-CCNC: 87 U/L (ref 40–129)
ALT SERPL-CCNC: 9 U/L (ref 0–40)
AMPHET UR QL SCN: NEGATIVE
ANION GAP SERPL CALCULATED.3IONS-SCNC: 9 MMOL/L (ref 7–16)
APAP SERPL-MCNC: <5 UG/ML (ref 10–30)
AST SERPL-CCNC: 17 U/L (ref 0–39)
BARBITURATES UR QL SCN: NEGATIVE
BASOPHILS # BLD: 0.04 K/UL (ref 0–0.2)
BASOPHILS NFR BLD: 1 % (ref 0–2)
BENZODIAZ UR QL: NEGATIVE
BILIRUB SERPL-MCNC: 0.2 MG/DL (ref 0–1.2)
BUN SERPL-MCNC: 8 MG/DL (ref 6–20)
BUPRENORPHINE UR QL: NEGATIVE
CALCIUM SERPL-MCNC: 9 MG/DL (ref 8.6–10.2)
CANNABINOIDS UR QL SCN: POSITIVE
CHLORIDE SERPL-SCNC: 105 MMOL/L (ref 98–107)
CO2 SERPL-SCNC: 26 MMOL/L (ref 22–29)
COCAINE UR QL SCN: NEGATIVE
CREAT SERPL-MCNC: 0.9 MG/DL (ref 0.7–1.2)
EKG ATRIAL RATE: 58 BPM
EKG ATRIAL RATE: 64 BPM
EKG P AXIS: 67 DEGREES
EKG P AXIS: 71 DEGREES
EKG P-R INTERVAL: 126 MS
EKG P-R INTERVAL: 134 MS
EKG Q-T INTERVAL: 376 MS
EKG Q-T INTERVAL: 382 MS
EKG QRS DURATION: 82 MS
EKG QRS DURATION: 84 MS
EKG QTC CALCULATION (BAZETT): 369 MS
EKG QTC CALCULATION (BAZETT): 394 MS
EKG R AXIS: 59 DEGREES
EKG R AXIS: 74 DEGREES
EKG T AXIS: 59 DEGREES
EKG T AXIS: 72 DEGREES
EKG VENTRICULAR RATE: 58 BPM
EKG VENTRICULAR RATE: 64 BPM
EOSINOPHIL # BLD: 0.53 K/UL (ref 0.05–0.5)
EOSINOPHILS RELATIVE PERCENT: 7 % (ref 0–6)
ERYTHROCYTE [DISTWIDTH] IN BLOOD BY AUTOMATED COUNT: 12.6 % (ref 11.5–15)
ETHANOLAMINE SERPL-MCNC: <10 MG/DL (ref 0–0.08)
FENTANYL UR QL: NEGATIVE
GFR, ESTIMATED: >90 ML/MIN/1.73M2
GLUCOSE SERPL-MCNC: 94 MG/DL (ref 74–99)
HCT VFR BLD AUTO: 47.4 % (ref 37–54)
HGB BLD-MCNC: 16.1 G/DL (ref 12.5–16.5)
IMM GRANULOCYTES # BLD AUTO: 0.04 K/UL (ref 0–0.58)
IMM GRANULOCYTES NFR BLD: 1 % (ref 0–5)
LYMPHOCYTES NFR BLD: 1.82 K/UL (ref 1.5–4)
LYMPHOCYTES RELATIVE PERCENT: 23 % (ref 20–42)
MCH RBC QN AUTO: 29.8 PG (ref 26–35)
MCHC RBC AUTO-ENTMCNC: 34 G/DL (ref 32–34.5)
MCV RBC AUTO: 87.6 FL (ref 80–99.9)
METHADONE UR QL: NEGATIVE
MONOCYTES NFR BLD: 0.54 K/UL (ref 0.1–0.95)
MONOCYTES NFR BLD: 7 % (ref 2–12)
NEUTROPHILS NFR BLD: 63 % (ref 43–80)
NEUTS SEG NFR BLD: 4.99 K/UL (ref 1.8–7.3)
OPIATES UR QL SCN: NEGATIVE
OXYCODONE UR QL SCN: NEGATIVE
PCP UR QL SCN: NEGATIVE
PLATELET # BLD AUTO: 237 K/UL (ref 130–450)
PMV BLD AUTO: 9.1 FL (ref 7–12)
POTASSIUM SERPL-SCNC: 4.4 MMOL/L (ref 3.5–5)
PROT SERPL-MCNC: 6.5 G/DL (ref 6.4–8.3)
RBC # BLD AUTO: 5.41 M/UL (ref 3.8–5.8)
SALICYLATES SERPL-MCNC: <0.3 MG/DL (ref 0–30)
SODIUM SERPL-SCNC: 140 MMOL/L (ref 132–146)
TEST INFORMATION: ABNORMAL
TOXIC TRICYCLIC SC,BLOOD: NEGATIVE
WBC OTHER # BLD: 8 K/UL (ref 4.5–11.5)

## 2024-11-29 PROCEDURE — 93005 ELECTROCARDIOGRAM TRACING: CPT | Performed by: EMERGENCY MEDICINE

## 2024-11-29 PROCEDURE — 93010 ELECTROCARDIOGRAM REPORT: CPT | Performed by: INTERNAL MEDICINE

## 2024-11-29 NOTE — ED PROVIDER NOTES
[facility] EMERGENCY DEPARTMENT  EMERGENCY DEPARTMENT ENCOUNTER        Pt Name: Lukas Foss  MRN: [id number]  Birthdate 2005  Date of evaluation: 11/28/2024  Provider: Drew Bower DO  PCP: No primary care provider on file.  Note Started: 10:18 PM EST 11/28/24    CHIEF COMPLAINT       Chief Complaint   Patient presents with    Suicidal     Patient states that he has been having anxiety recently and feels suicidal, denies plan at this time       HISTORY OF PRESENT ILLNESS: 1 or more Elements   History From: patient    Limitations to history : None    Lukas Foss is a 19 y.o. male who presents to the ED for evaluation of suicidal ideations.  Patient states that he has history of depression anxiety and has gotten worse specially during the holiday.  He states both his parents are incarcerated.  No prior history of suicide attempt.  Patient states he has a plan of jumping in front of traffic.  He states he wants to make it as painful as possible.  Currently homeless and is not working.  Denies any illicit drug use.  Occasional nicotine use.  No alcohol use.  Not currently on any medications for anxiety or depression.    Nursing Notes were all reviewed and agreed with or any disagreements were addressed in the HPI.      REVIEW OF EXTERNAL NOTE :        REVIEW OF SYSTEMS :           Positives and Pertinent negatives as per HPI.     SURGICAL HISTORY   No past surgical history on file.    CURRENTMEDICATIONS       Previous Medications    No medications on file       ALLERGIES     [place]    FAMILYHISTORY     No family history on file.     SOCIAL HISTORY       Social History     Tobacco Use    Smoking status: Former     Current packs/day: 1.00     Average packs/day: 1 pack/day for 6.0 years (6.0 ttl pk-yrs)     Types: Cigarettes    Smokeless tobacco: Never   Vaping Use    Vaping status: Every Day    Substances: Nicotine, Flavoring    Devices: Disposable   Substance

## 2024-11-29 NOTE — ED NOTES
Pt placed in suicidal precautions. Belongings collected, placed in bag, labeled and placed in locker number 1. Pt placed in safety gown. Ligature risks removed from environment. Sitter at bedside. Pt calm and cooperative.

## 2024-11-30 ENCOUNTER — HOSPITAL ENCOUNTER (EMERGENCY)
Age: 19
Discharge: HOME | End: 2024-11-30
Payer: COMMERCIAL

## 2024-11-30 ENCOUNTER — APPOINTMENT (OUTPATIENT)
Dept: GENERAL RADIOLOGY | Age: 19
End: 2024-11-30
Payer: COMMERCIAL

## 2024-11-30 VITALS
HEART RATE: 93 BPM | RESPIRATION RATE: 20 BRPM | DIASTOLIC BLOOD PRESSURE: 80 MMHG | TEMPERATURE: 98.2 F | SYSTOLIC BLOOD PRESSURE: 132 MMHG | OXYGEN SATURATION: 99 %

## 2024-11-30 DIAGNOSIS — S93.401A SPRAIN OF RIGHT ANKLE, UNSPECIFIED LIGAMENT, INITIAL ENCOUNTER: ICD-10-CM

## 2024-11-30 DIAGNOSIS — M25.561 ACUTE PAIN OF RIGHT KNEE: Primary | ICD-10-CM

## 2024-11-30 PROCEDURE — 73610 X-RAY EXAM OF ANKLE: CPT

## 2024-11-30 PROCEDURE — 99283 EMERGENCY DEPT VISIT LOW MDM: CPT

## 2024-11-30 PROCEDURE — 73562 X-RAY EXAM OF KNEE 3: CPT

## 2024-11-30 PROCEDURE — 6370000000 HC RX 637 (ALT 250 FOR IP): Performed by: PHYSICIAN ASSISTANT

## 2024-11-30 RX ORDER — ACETAMINOPHEN 500 MG
1000 TABLET ORAL ONCE
Status: COMPLETED | OUTPATIENT
Start: 2024-11-30 | End: 2024-11-30

## 2024-11-30 RX ADMIN — ACETAMINOPHEN 1000 MG: 500 TABLET ORAL at 22:09

## 2024-12-01 NOTE — ED PROVIDER NOTES
Independent CHRISTOPHER Visit.        HPI:  11/30/24, Time: 9:46 PM ANTHONY Foss is a 19 y.o. male presenting to the ED for right knee and ankle pain, beginning just prior to arrival after twisting it off of a curb.  The complaint has been persistent, mild in severity, and worsened by standing, walking.  Patient provides history in the emergency department today.  States that he was walking on the curb and twisted and fell.  Did not hit his head or lose consciousness.  Currently reporting pain to the right knee and right ankle.  Does think that he had a prior fracture somewhere in this leg but is on sure of where.  Denies any surgeries.  No numbness or tingling to the lower extremities.  Afebrile that recent travel or sick contacts.  Patient denies all other symptoms at this time.    Review of Systems:   A complete review of systems was performed and pertinent positives and negatives are stated within HPI, all other systems reviewed and are negative.          --------------------------------------------- PAST HISTORY ---------------------------------------------  Past Medical History:  has a past medical history of ADHD (attention deficit hyperactivity disorder), Autism disorder, Bipolar 1 disorder (HCC), GERD (gastroesophageal reflux disease), Hypotonia, Intermittent explosive disorder, Microcephalic (HCC), and Schizoaffective disorder ([facility]).    Past Surgical History:  has no past surgical history on file.    Social History:  reports that he has quit smoking. His smoking use included cigarettes. He has a 6 pack-year smoking history. He has never used smokeless tobacco. He reports that he does not currently use alcohol after a past usage of about 15.0 standard drinks of alcohol per week. He reports that he does not currently use drugs after having used the following drugs: Marijuana (Weed). Frequency: 7.00 times per week.    Family History: family history is not on file.     The patient’s home medications

## 2024-12-06 ENCOUNTER — HOSPITAL ENCOUNTER (EMERGENCY)
Age: 19
Discharge: ANOTHER HOSPITAL | End: 2024-12-07
Attending: STUDENT IN AN ORGANIZED HEALTH CARE EDUCATION/TRAINING PROGRAM
Payer: COMMERCIAL

## 2024-12-06 DIAGNOSIS — R45.851 SUICIDAL IDEATION: Primary | ICD-10-CM

## 2024-12-06 PROCEDURE — 99285 EMERGENCY DEPT VISIT HI MDM: CPT

## 2024-12-06 ASSESSMENT — PAIN - FUNCTIONAL ASSESSMENT: PAIN_FUNCTIONAL_ASSESSMENT: NONE - DENIES PAIN

## 2024-12-07 VITALS
HEIGHT: 69 IN | OXYGEN SATURATION: 97 % | HEART RATE: 60 BPM | BODY MASS INDEX: 23.7 KG/M2 | DIASTOLIC BLOOD PRESSURE: 66 MMHG | WEIGHT: 160 LBS | TEMPERATURE: 97.9 F | RESPIRATION RATE: 14 BRPM | SYSTOLIC BLOOD PRESSURE: 111 MMHG

## 2024-12-07 LAB
ALBUMIN SERPL-MCNC: 4.5 G/DL (ref 3.5–5.2)
ALP SERPL-CCNC: 79 U/L (ref 40–129)
ALT SERPL-CCNC: 11 U/L (ref 0–40)
AMPHET UR QL SCN: NEGATIVE
ANION GAP SERPL CALCULATED.3IONS-SCNC: 8 MMOL/L (ref 7–16)
APAP SERPL-MCNC: <5 UG/ML (ref 10–30)
AST SERPL-CCNC: 20 U/L (ref 0–39)
BARBITURATES UR QL SCN: NEGATIVE
BASOPHILS # BLD: 0.06 K/UL (ref 0–0.2)
BASOPHILS NFR BLD: 1 % (ref 0–2)
BENZODIAZ UR QL: NEGATIVE
BILIRUB SERPL-MCNC: 0.4 MG/DL (ref 0–1.2)
BUN SERPL-MCNC: 18 MG/DL (ref 6–20)
BUPRENORPHINE UR QL: NEGATIVE
CALCIUM SERPL-MCNC: 9.5 MG/DL (ref 8.6–10.2)
CANNABINOIDS UR QL SCN: POSITIVE
CHLORIDE SERPL-SCNC: 99 MMOL/L (ref 98–107)
CK SERPL-CCNC: 271 U/L (ref 20–200)
CO2 SERPL-SCNC: 30 MMOL/L (ref 22–29)
COCAINE UR QL SCN: NEGATIVE
CREAT SERPL-MCNC: 0.9 MG/DL (ref 0.7–1.2)
EOSINOPHIL # BLD: 0.71 K/UL (ref 0.05–0.5)
EOSINOPHILS RELATIVE PERCENT: 8 % (ref 0–6)
ERYTHROCYTE [DISTWIDTH] IN BLOOD BY AUTOMATED COUNT: 12.6 % (ref 11.5–15)
ETHANOLAMINE SERPL-MCNC: <10 MG/DL (ref 0–0.08)
FENTANYL UR QL: NEGATIVE
GFR, ESTIMATED: >90 ML/MIN/1.73M2
GLUCOSE SERPL-MCNC: 84 MG/DL (ref 74–99)
HCT VFR BLD AUTO: 50.3 % (ref 37–54)
HGB BLD-MCNC: 16.5 G/DL (ref 12.5–16.5)
IMM GRANULOCYTES # BLD AUTO: 0.03 K/UL (ref 0–0.58)
IMM GRANULOCYTES NFR BLD: 0 % (ref 0–5)
LYMPHOCYTES NFR BLD: 2.78 K/UL (ref 1.5–4)
LYMPHOCYTES RELATIVE PERCENT: 32 % (ref 20–42)
MCH RBC QN AUTO: 29.5 PG (ref 26–35)
MCHC RBC AUTO-ENTMCNC: 32.8 G/DL (ref 32–34.5)
MCV RBC AUTO: 89.8 FL (ref 80–99.9)
METHADONE UR QL: NEGATIVE
MONOCYTES NFR BLD: 0.69 K/UL (ref 0.1–0.95)
MONOCYTES NFR BLD: 8 % (ref 2–12)
NEUTROPHILS NFR BLD: 51 % (ref 43–80)
NEUTS SEG NFR BLD: 4.39 K/UL (ref 1.8–7.3)
OPIATES UR QL SCN: NEGATIVE
OXYCODONE UR QL SCN: NEGATIVE
PCP UR QL SCN: NEGATIVE
PLATELET # BLD AUTO: 232 K/UL (ref 130–450)
PMV BLD AUTO: 9.1 FL (ref 7–12)
POTASSIUM SERPL-SCNC: 4.3 MMOL/L (ref 3.5–5)
PROT SERPL-MCNC: 6.7 G/DL (ref 6.4–8.3)
RBC # BLD AUTO: 5.6 M/UL (ref 3.8–5.8)
SALICYLATES SERPL-MCNC: <0.3 MG/DL (ref 0–30)
SODIUM SERPL-SCNC: 137 MMOL/L (ref 132–146)
TEST INFORMATION: ABNORMAL
TOXIC TRICYCLIC SC,BLOOD: NEGATIVE
WBC OTHER # BLD: 8.7 K/UL (ref 4.5–11.5)

## 2024-12-07 PROCEDURE — 80179 DRUG ASSAY SALICYLATE: CPT

## 2024-12-07 PROCEDURE — 85025 COMPLETE CBC W/AUTO DIFF WBC: CPT

## 2024-12-07 PROCEDURE — G0480 DRUG TEST DEF 1-7 CLASSES: HCPCS

## 2024-12-07 PROCEDURE — 6370000000 HC RX 637 (ALT 250 FOR IP)

## 2024-12-07 PROCEDURE — 80307 DRUG TEST PRSMV CHEM ANLYZR: CPT

## 2024-12-07 PROCEDURE — 96372 THER/PROPH/DIAG INJ SC/IM: CPT

## 2024-12-07 PROCEDURE — 6360000002 HC RX W HCPCS

## 2024-12-07 PROCEDURE — 80053 COMPREHEN METABOLIC PANEL: CPT

## 2024-12-07 PROCEDURE — 93005 ELECTROCARDIOGRAM TRACING: CPT | Performed by: STUDENT IN AN ORGANIZED HEALTH CARE EDUCATION/TRAINING PROGRAM

## 2024-12-07 PROCEDURE — 82550 ASSAY OF CK (CPK): CPT

## 2024-12-07 PROCEDURE — 80143 DRUG ASSAY ACETAMINOPHEN: CPT

## 2024-12-07 RX ORDER — LIDOCAINE HYDROCHLORIDE 10 MG/ML
5 INJECTION, SOLUTION INFILTRATION; PERINEURAL ONCE
Status: DISCONTINUED | OUTPATIENT
Start: 2024-12-07 | End: 2024-12-07 | Stop reason: HOSPADM

## 2024-12-07 RX ORDER — HYDROXYZINE PAMOATE 25 MG/1
50 CAPSULE ORAL ONCE
Status: COMPLETED | OUTPATIENT
Start: 2024-12-07 | End: 2024-12-07

## 2024-12-07 RX ORDER — ZIPRASIDONE MESYLATE 20 MG/ML
20 INJECTION, POWDER, LYOPHILIZED, FOR SOLUTION INTRAMUSCULAR ONCE
Status: COMPLETED | OUTPATIENT
Start: 2024-12-07 | End: 2024-12-07

## 2024-12-07 RX ORDER — DROPERIDOL 2.5 MG/ML
2.5 INJECTION, SOLUTION INTRAMUSCULAR; INTRAVENOUS ONCE
Status: DISCONTINUED | OUTPATIENT
Start: 2024-12-07 | End: 2024-12-07

## 2024-12-07 RX ORDER — DIPHENHYDRAMINE HCL 25 MG
50 TABLET ORAL ONCE
Status: COMPLETED | OUTPATIENT
Start: 2024-12-07 | End: 2024-12-07

## 2024-12-07 RX ORDER — WATER 10 ML/10ML
INJECTION INTRAMUSCULAR; INTRAVENOUS; SUBCUTANEOUS
Status: DISCONTINUED
Start: 2024-12-07 | End: 2024-12-07 | Stop reason: HOSPADM

## 2024-12-07 RX ORDER — LORAZEPAM 1 MG/1
2 TABLET ORAL ONCE
Status: COMPLETED | OUTPATIENT
Start: 2024-12-07 | End: 2024-12-07

## 2024-12-07 RX ADMIN — HYDROXYZINE PAMOATE 50 MG: 25 CAPSULE ORAL at 09:18

## 2024-12-07 RX ADMIN — LORAZEPAM 2 MG: 1 TABLET ORAL at 09:46

## 2024-12-07 RX ADMIN — ZIPRASIDONE MESYLATE 20 MG: 20 INJECTION, POWDER, LYOPHILIZED, FOR SOLUTION INTRAMUSCULAR at 10:51

## 2024-12-07 RX ADMIN — DIPHENHYDRAMINE HYDROCHLORIDE 50 MG: 25 TABLET ORAL at 09:46

## 2024-12-07 NOTE — ED NOTES
Pt states he wants to go home and that he does not want to go to [facility]. Pt states he would rather go to Crisis Unit in [place]. Pt is agitated at this time.

## 2024-12-07 NOTE — PSYCHOTHERAPY
[facility] consult completed. No beds available [facility], referred to [facility], accepted by SAM Nixon. [facility]ao worker faxing referral packet to [facility].

## 2024-12-07 NOTE — ED NOTES
Patient changed into a paper gown without ties, all of his belongings (shoes, socks, pants, shirt, jacket, cell phone)were placed in two bags with patient stickers on both sides and placed in locker # 4.
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
## 2024-12-07 NOTE — ED NOTES
Patient came in to receive b12 shot in right deltoid    Report given and care transferred to BETINA Cervantes.
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
## 2024-12-07 NOTE — ED PROVIDER NOTES
[facility] EMERGENCY DEPARTMENT  EMERGENCY DEPARTMENT ENCOUNTER      Pt Name: Lukas Foss  MRN: [id number]  Birthdate 2005  Date of evaluation: 12/6/2024  Provider: Rohit Saenz DO  PCP: No primary care provider on file.  Note Started: 3:26 AM EST 12/7/24    CHIEF COMPLAINT       Chief Complaint   Patient presents with    Suicidal     Pt states he has \"a lot going on\" and he's \"tired of people giving [him] a hard time;\" patient states he would get a gun and shoot himself in the face or walk into traffic if he didn't call for help; has no physical complaints at this time       HISTORY OF PRESENT ILLNESS: 1 or more Elements   History From: Patient  Limitations to history : None    Lukas Foss is a 19 y.o. male Past medical history of bipolar type I, as well as ADHD.  Patient presents with chief complaint of depression and suicidal ideation.  Patient states that for the last few days he has been feeling gradually worsening depression as well as some suicidal ideation.  Patient states that he does have a plan that would be to walk into traffic or get a gun from \"his friend and shoot himself\".  Symptoms have been moderate severity constant onset he has had multiple episodes the past.  Patient denies any fevers, chills, chest pain, cough, Dev pain, constipation or diarrhea    Nursing Notes were all reviewed and agreed with or any disagreements were addressed in the HPI.    REVIEW OF SYSTEMS :    Positives and Pertinent negatives as per HPI.     PAST MEDICAL HISTORY/Chronic Conditions Affecting Care    has a past medical history of ADHD (attention deficit hyperactivity disorder), Autism disorder, Bipolar 1 disorder (HCC), GERD (gastroesophageal reflux disease), Hypotonia, Intermittent explosive disorder, Microcephalic (HCC), and Schizoaffective disorder ([facility]).     SURGICAL HISTORY   No past surgical history on file.    CURRENTMEDICATIONS       Previous Medications     perfused, no clubbing, no cyanosis, no edema. Capillary refill <3 seconds  Integument: skin warm and dry. No rashes.   Neurologic: GCS 15, no focal deficits, symmetric strength 5/5 in the upper and lower extremities bilaterally  Psychiatric: Depression and suicidal ideation    DIAGNOSTIC RESULTS   LABS:    Labs Reviewed   CBC WITH AUTO DIFFERENTIAL - Abnormal; Notable for the following components:       Result Value    Eosinophils % 8 (*)     Eosinophils Absolute 0.71 (*)     All other components within normal limits   COMPREHENSIVE METABOLIC PANEL W/ REFLEX TO MG FOR LOW K - Abnormal; Notable for the following components:    CO2 30 (*)     All other components within normal limits   SERUM DRUG SCREEN - Abnormal; Notable for the following components:    Acetaminophen Level <5 (*)     All other components within normal limits   URINE DRUG SCREEN - Abnormal; Notable for the following components:    Cannabinoid Scrn, Ur POSITIVE (*)     All other components within normal limits   CK - Abnormal; Notable for the following components:    Total  (*)     All other components within normal limits       As interpreted by me, the above displayed labs are abnormal. All other labs obtained during this visit were within normal range or not returned as of this dictation.    EKG Interpretation:  Interpreted by emergency department physician, Rohit Saenz DO  Rate: 79  Rhythm: Sinus  Interpretation: EKG obtained today normal sinus rhythm, rate 79, normal axis, QTc 421, no acute ST segment changes.  Stable compared to previous EKG  Comparison: stable as compared to patient's most recent EKG    RADIOLOGY:   Non-plain film images such as CT, Ultrasound and MRI are read by the radiologist. Plain radiographic images are visualized and preliminarily interpreted by the ED Provider with the below findings:      Interpretation per the Radiologist below, if available at the time of this note:    No orders to display     XR KNEE

## 2024-12-08 LAB
EKG ATRIAL RATE: 79 BPM
EKG P AXIS: 70 DEGREES
EKG P-R INTERVAL: 152 MS
EKG Q-T INTERVAL: 368 MS
EKG QRS DURATION: 74 MS
EKG QTC CALCULATION (BAZETT): 421 MS
EKG R AXIS: 59 DEGREES
EKG T AXIS: 53 DEGREES
EKG VENTRICULAR RATE: 79 BPM

## 2024-12-08 PROCEDURE — 93010 ELECTROCARDIOGRAM REPORT: CPT | Performed by: INTERNAL MEDICINE

## 2024-12-30 ENCOUNTER — HOSPITAL ENCOUNTER (INPATIENT)
Age: 19
LOS: 3 days | Discharge: HOME | DRG: 753 | End: 2025-01-03
Attending: EMERGENCY MEDICINE | Admitting: PSYCHIATRY & NEUROLOGY
Payer: COMMERCIAL

## 2024-12-30 DIAGNOSIS — F32.A DEPRESSION, UNSPECIFIED DEPRESSION TYPE: Primary | ICD-10-CM

## 2024-12-30 LAB
ALBUMIN SERPL-MCNC: 4.3 G/DL (ref 3.5–5.2)
ALP SERPL-CCNC: 95 U/L (ref 40–129)
ALT SERPL-CCNC: 12 U/L (ref 0–40)
AMPHET UR QL SCN: NEGATIVE
ANION GAP SERPL CALCULATED.3IONS-SCNC: 9 MMOL/L (ref 7–16)
APAP SERPL-MCNC: <5 UG/ML (ref 10–30)
AST SERPL-CCNC: 23 U/L (ref 0–39)
BARBITURATES UR QL SCN: NEGATIVE
BASOPHILS # BLD: 0.04 K/UL (ref 0–0.2)
BASOPHILS NFR BLD: 1 % (ref 0–2)
BENZODIAZ UR QL: NEGATIVE
BILIRUB SERPL-MCNC: 0.5 MG/DL (ref 0–1.2)
BILIRUB UR QL STRIP: NEGATIVE
BUN SERPL-MCNC: 14 MG/DL (ref 6–20)
BUPRENORPHINE UR QL: NEGATIVE
CALCIUM SERPL-MCNC: 9.2 MG/DL (ref 8.6–10.2)
CANNABINOIDS UR QL SCN: POSITIVE
CHLORIDE SERPL-SCNC: 104 MMOL/L (ref 98–107)
CLARITY UR: CLEAR
CO2 SERPL-SCNC: 27 MMOL/L (ref 22–29)
COCAINE UR QL SCN: NEGATIVE
COLOR UR: YELLOW
COMMENT: NORMAL
CREAT SERPL-MCNC: 0.9 MG/DL (ref 0.7–1.2)
EOSINOPHIL # BLD: 0.74 K/UL (ref 0.05–0.5)
EOSINOPHILS RELATIVE PERCENT: 10 % (ref 0–6)
ERYTHROCYTE [DISTWIDTH] IN BLOOD BY AUTOMATED COUNT: 12.5 % (ref 11.5–15)
ETHANOLAMINE SERPL-MCNC: <10 MG/DL (ref 0–0.08)
FENTANYL UR QL: NEGATIVE
GFR, ESTIMATED: >90 ML/MIN/1.73M2
GLUCOSE SERPL-MCNC: 72 MG/DL (ref 74–99)
GLUCOSE UR STRIP-MCNC: NEGATIVE MG/DL
HCT VFR BLD AUTO: 48.1 % (ref 37–54)
HGB BLD-MCNC: 15.9 G/DL (ref 12.5–16.5)
HGB UR QL STRIP.AUTO: NEGATIVE
IMM GRANULOCYTES # BLD AUTO: 0.03 K/UL (ref 0–0.58)
IMM GRANULOCYTES NFR BLD: 0 % (ref 0–5)
KETONES UR STRIP-MCNC: NEGATIVE MG/DL
LEUKOCYTE ESTERASE UR QL STRIP: NEGATIVE
LYMPHOCYTES NFR BLD: 1.87 K/UL (ref 1.5–4)
LYMPHOCYTES RELATIVE PERCENT: 25 % (ref 20–42)
MCH RBC QN AUTO: 28.8 PG (ref 26–35)
MCHC RBC AUTO-ENTMCNC: 33.1 G/DL (ref 32–34.5)
MCV RBC AUTO: 87.1 FL (ref 80–99.9)
METHADONE UR QL: NEGATIVE
MONOCYTES NFR BLD: 0.74 K/UL (ref 0.1–0.95)
MONOCYTES NFR BLD: 10 % (ref 2–12)
NEUTROPHILS NFR BLD: 55 % (ref 43–80)
NEUTS SEG NFR BLD: 4.16 K/UL (ref 1.8–7.3)
NITRITE UR QL STRIP: NEGATIVE
OPIATES UR QL SCN: NEGATIVE
OXYCODONE UR QL SCN: NEGATIVE
PCP UR QL SCN: NEGATIVE
PH UR STRIP: 7.5 [PH] (ref 5–9)
PLATELET # BLD AUTO: 239 K/UL (ref 130–450)
PMV BLD AUTO: 8.9 FL (ref 7–12)
POTASSIUM SERPL-SCNC: 3.9 MMOL/L (ref 3.5–5)
PROT SERPL-MCNC: 6.9 G/DL (ref 6.4–8.3)
PROT UR STRIP-MCNC: NEGATIVE MG/DL
RBC # BLD AUTO: 5.52 M/UL (ref 3.8–5.8)
SALICYLATES SERPL-MCNC: <0.3 MG/DL (ref 0–30)
SODIUM SERPL-SCNC: 140 MMOL/L (ref 132–146)
SP GR UR STRIP: 1.02 (ref 1–1.03)
TEST INFORMATION: ABNORMAL
TOXIC TRICYCLIC SC,BLOOD: NEGATIVE
UROBILINOGEN UR STRIP-ACNC: 1 EU/DL (ref 0–1)
WBC OTHER # BLD: 7.6 K/UL (ref 4.5–11.5)

## 2024-12-30 PROCEDURE — 80164 ASSAY DIPROPYLACETIC ACD TOT: CPT

## 2024-12-30 PROCEDURE — G0480 DRUG TEST DEF 1-7 CLASSES: HCPCS

## 2024-12-30 PROCEDURE — 80053 COMPREHEN METABOLIC PANEL: CPT

## 2024-12-30 PROCEDURE — 80179 DRUG ASSAY SALICYLATE: CPT

## 2024-12-30 PROCEDURE — 80143 DRUG ASSAY ACETAMINOPHEN: CPT

## 2024-12-30 PROCEDURE — 81003 URINALYSIS AUTO W/O SCOPE: CPT

## 2024-12-30 PROCEDURE — 93005 ELECTROCARDIOGRAM TRACING: CPT | Performed by: EMERGENCY MEDICINE

## 2024-12-30 PROCEDURE — 99285 EMERGENCY DEPT VISIT HI MDM: CPT

## 2024-12-30 PROCEDURE — 80307 DRUG TEST PRSMV CHEM ANLYZR: CPT

## 2024-12-30 PROCEDURE — 90791 PSYCH DIAGNOSTIC EVALUATION: CPT | Performed by: COUNSELOR

## 2024-12-30 PROCEDURE — 85025 COMPLETE CBC W/AUTO DIFF WBC: CPT

## 2024-12-30 ASSESSMENT — PAIN - FUNCTIONAL ASSESSMENT: PAIN_FUNCTIONAL_ASSESSMENT: NONE - DENIES PAIN

## 2024-12-31 PROBLEM — R45.851 SUICIDAL IDEATION: Status: ACTIVE | Noted: 2024-12-31

## 2024-12-31 PROBLEM — R45.851 SUICIDAL IDEATIONS: Status: ACTIVE | Noted: 2024-12-31

## 2024-12-31 LAB
DATE LAST DOSE: ABNORMAL
EKG ATRIAL RATE: 73 BPM
EKG P AXIS: 66 DEGREES
EKG P-R INTERVAL: 150 MS
EKG Q-T INTERVAL: 358 MS
EKG QRS DURATION: 70 MS
EKG QTC CALCULATION (BAZETT): 394 MS
EKG R AXIS: 44 DEGREES
EKG T AXIS: 39 DEGREES
EKG VENTRICULAR RATE: 73 BPM
TME LAST DOSE: ABNORMAL H
VALPROATE SERPL-MCNC: <3 UG/ML (ref 50–100)
VANCOMYCIN DOSE: ABNORMAL MG

## 2024-12-31 PROCEDURE — 6360000002 HC RX W HCPCS: Performed by: PSYCHIATRY & NEUROLOGY

## 2024-12-31 PROCEDURE — 93010 ELECTROCARDIOGRAM REPORT: CPT | Performed by: INTERNAL MEDICINE

## 2024-12-31 PROCEDURE — 1240000000 HC EMOTIONAL WELLNESS R&B

## 2024-12-31 PROCEDURE — 6360000002 HC RX W HCPCS: Performed by: NURSE PRACTITIONER

## 2024-12-31 PROCEDURE — 6370000000 HC RX 637 (ALT 250 FOR IP)

## 2024-12-31 PROCEDURE — 6360000002 HC RX W HCPCS

## 2024-12-31 PROCEDURE — 90792 PSYCH DIAG EVAL W/MED SRVCS: CPT

## 2024-12-31 PROCEDURE — 6370000000 HC RX 637 (ALT 250 FOR IP): Performed by: PSYCHIATRY & NEUROLOGY

## 2024-12-31 RX ORDER — HYDROXYZINE HYDROCHLORIDE 50 MG/1
50 TABLET, FILM COATED ORAL 3 TIMES DAILY PRN
Status: DISCONTINUED | OUTPATIENT
Start: 2024-12-31 | End: 2025-01-03 | Stop reason: HOSPADM

## 2024-12-31 RX ORDER — ACETAMINOPHEN 325 MG/1
650 TABLET ORAL EVERY 4 HOURS PRN
Status: DISCONTINUED | OUTPATIENT
Start: 2024-12-31 | End: 2024-12-31 | Stop reason: SDUPTHER

## 2024-12-31 RX ORDER — DIVALPROEX SODIUM 500 MG/1
500 TABLET, DELAYED RELEASE ORAL 3 TIMES DAILY
Status: ON HOLD | COMMUNITY
End: 2025-01-03 | Stop reason: HOSPADM

## 2024-12-31 RX ORDER — HALOPERIDOL 5 MG/ML
5 INJECTION INTRAMUSCULAR EVERY 6 HOURS PRN
Status: DISCONTINUED | OUTPATIENT
Start: 2024-12-31 | End: 2025-01-03 | Stop reason: HOSPADM

## 2024-12-31 RX ORDER — DIPHENHYDRAMINE HYDROCHLORIDE 50 MG/ML
INJECTION INTRAMUSCULAR; INTRAVENOUS
Status: DISPENSED
Start: 2024-12-31 | End: 2025-01-01

## 2024-12-31 RX ORDER — MAGNESIUM HYDROXIDE/ALUMINUM HYDROXICE/SIMETHICONE 120; 1200; 1200 MG/30ML; MG/30ML; MG/30ML
30 SUSPENSION ORAL PRN
Status: DISCONTINUED | OUTPATIENT
Start: 2024-12-31 | End: 2025-01-03 | Stop reason: HOSPADM

## 2024-12-31 RX ORDER — POLYETHYLENE GLYCOL 3350 17 G/17G
17 POWDER, FOR SOLUTION ORAL DAILY PRN
Status: DISCONTINUED | OUTPATIENT
Start: 2024-12-31 | End: 2025-01-03 | Stop reason: HOSPADM

## 2024-12-31 RX ORDER — DIVALPROEX SODIUM 250 MG/1
250 TABLET, DELAYED RELEASE ORAL EVERY 12 HOURS SCHEDULED
Status: DISCONTINUED | OUTPATIENT
Start: 2024-12-31 | End: 2025-01-02

## 2024-12-31 RX ORDER — RISPERIDONE 0.5 MG/1
1 TABLET, ORALLY DISINTEGRATING ORAL 2 TIMES DAILY
Status: DISCONTINUED | OUTPATIENT
Start: 2024-12-31 | End: 2025-01-02

## 2024-12-31 RX ORDER — NICOTINE 21 MG/24HR
1 PATCH, TRANSDERMAL 24 HOURS TRANSDERMAL DAILY
Status: DISCONTINUED | OUTPATIENT
Start: 2024-12-31 | End: 2025-01-02

## 2024-12-31 RX ORDER — DIPHENHYDRAMINE HYDROCHLORIDE 50 MG/ML
25 INJECTION INTRAMUSCULAR; INTRAVENOUS EVERY 6 HOURS PRN
Status: DISCONTINUED | OUTPATIENT
Start: 2024-12-31 | End: 2025-01-03 | Stop reason: HOSPADM

## 2024-12-31 RX ORDER — LORAZEPAM 2 MG/ML
1 INJECTION INTRAMUSCULAR EVERY 6 HOURS PRN
Status: DISCONTINUED | OUTPATIENT
Start: 2024-12-31 | End: 2025-01-03 | Stop reason: HOSPADM

## 2024-12-31 RX ORDER — ACETAMINOPHEN 325 MG/1
650 TABLET ORAL EVERY 6 HOURS PRN
Status: DISCONTINUED | OUTPATIENT
Start: 2024-12-31 | End: 2025-01-03 | Stop reason: HOSPADM

## 2024-12-31 RX ORDER — HALOPERIDOL 5 MG/1
5 TABLET ORAL EVERY 6 HOURS PRN
Status: DISCONTINUED | OUTPATIENT
Start: 2024-12-31 | End: 2025-01-03 | Stop reason: HOSPADM

## 2024-12-31 RX ORDER — LORAZEPAM 2 MG/ML
INJECTION INTRAMUSCULAR
Status: COMPLETED
Start: 2024-12-31 | End: 2024-12-31

## 2024-12-31 RX ADMIN — RISPERIDONE 1 MG: 0.5 TABLET, ORALLY DISINTEGRATING ORAL at 20:40

## 2024-12-31 RX ADMIN — HALOPERIDOL LACTATE 5 MG: 5 INJECTION, SOLUTION INTRAMUSCULAR at 15:17

## 2024-12-31 RX ADMIN — LORAZEPAM 1 MG: 2 INJECTION INTRAMUSCULAR; INTRAVENOUS at 15:15

## 2024-12-31 RX ADMIN — DIVALPROEX SODIUM 250 MG: 250 TABLET, DELAYED RELEASE ORAL at 20:40

## 2024-12-31 RX ADMIN — RISPERIDONE 1 MG: 0.5 TABLET, ORALLY DISINTEGRATING ORAL at 12:55

## 2024-12-31 RX ADMIN — HYDROXYZINE HYDROCHLORIDE 50 MG: 50 TABLET ORAL at 14:10

## 2024-12-31 RX ADMIN — Medication 3 MG: at 20:40

## 2024-12-31 RX ADMIN — DIPHENHYDRAMINE HYDROCHLORIDE 25 MG: 50 INJECTION INTRAMUSCULAR; INTRAVENOUS at 15:13

## 2024-12-31 ASSESSMENT — PATIENT HEALTH QUESTIONNAIRE - PHQ9
SUM OF ALL RESPONSES TO PHQ QUESTIONS 1-9: 0
2. FEELING DOWN, DEPRESSED OR HOPELESS: NOT AT ALL
1. LITTLE INTEREST OR PLEASURE IN DOING THINGS: NOT AT ALL
SUM OF ALL RESPONSES TO PHQ9 QUESTIONS 1 & 2: 0
SUM OF ALL RESPONSES TO PHQ QUESTIONS 1-9: 0
SUM OF ALL RESPONSES TO PHQ QUESTIONS 1-9: 0
1. LITTLE INTEREST OR PLEASURE IN DOING THINGS: NOT AT ALL
SUM OF ALL RESPONSES TO PHQ QUESTIONS 1-9: 0
SUM OF ALL RESPONSES TO PHQ9 QUESTIONS 1 & 2: 0
SUM OF ALL RESPONSES TO PHQ QUESTIONS 1-9: 0
SUM OF ALL RESPONSES TO PHQ QUESTIONS 1-9: 0
2. FEELING DOWN, DEPRESSED OR HOPELESS: NOT AT ALL

## 2024-12-31 ASSESSMENT — SLEEP AND FATIGUE QUESTIONNAIRES
DO YOU USE A SLEEP AID: NO
AVERAGE NUMBER OF SLEEP HOURS: 10
SLEEP PATTERN: NORMAL
DO YOU HAVE DIFFICULTY SLEEPING: NO
AVERAGE NUMBER OF SLEEP HOURS: 10
DO YOU USE A SLEEP AID: NO
DO YOU HAVE DIFFICULTY SLEEPING: NO

## 2024-12-31 NOTE — BH NOTE
4 Eyes Skin Assessment     NAME:  Lukas Foss  YOB: 2005  MEDICAL RECORD NUMBER:  [id number]    The patient is being assessed for  Admission    I agree that at least one RN has performed a thorough Head to Toe Skin Assessment on the patient. ALL assessment sites listed below have been assessed.      Areas assessed by both nurses:    Head, Face, Ears, Shoulders, Back, Chest, Arms, Elbows, Hands, Sacrum. Buttock, Coccyx, Ischium, and Legs. Feet and Heels        Does the Patient have a Wound? No noted wound(s)       Curtis Prevention initiated by RN: No  Wound Care Orders initiated by RN: No    Pressure Injury (Stage 3,4, Unstageable, DTI, NWPT, and Complex wounds) if present, place Wound referral order by RN under : No    New Ostomies, if present place, Ostomy referral order under : No     Nurse 1 eSignature: Electronically signed by Hannah Chowdhury RN on 12/31/24 at 9:00 AM EST    **SHARE this note so that the co-signing nurse can place an eSignature**    Nurse 2 eSignature: {Esignature:[id number]}

## 2024-12-31 NOTE — ED NOTES
Pt is pending review for [facility] admission. Per section G RN, unable to reach on call.

## 2024-12-31 NOTE — BH NOTE
Patient states he does not remember what medication he takes at home desides the depakote and he gets all meds filled at [place].

## 2024-12-31 NOTE — BH NOTE
Patient arrived to the unit agitated and yelling. Patient refusing his vital signs. Refusing to complete OQ or menu. Patient making threats stating, \"I'll punch anyone who tries to talk to me. I ain't with the talking. Anyone who gets smart with me. I'll punch them right in the mouth\". Patient wanting to be transferred next door and gets increasingly more agitated when told by staff that he needs to wait until seeing the doctor. Patient later stated in front of [employer], \"I will go to senior care if they don't take me over to the other unit ([facility])\". Education given on appropriate behavior in order to redirect the patient. Patient agreeable to wait for doctors and stay in control. Will continue to monitor.

## 2024-12-31 NOTE — BH NOTE
Patient has 6/10  anxiety at  this time he stated at morning assessment his anxiety was 0/10 so was his depression 0/10.  Patient has been pacing around the nurse.s station .He states he cannot be here New years.He also states he does not feel comfortable here . He says he knows when he states he does not feel comfortable here we have to transfer him. He then ripped his patient's folder in half and throw it on the floor. He was offered an anti anxiety medication atarax 50mg of which patient took without problem orally. JUAN Greene approached the desk and asked the patient to do his assessment.they both went into patient;s room. Patient states he wants to go to our south unit right now!

## 2024-12-31 NOTE — H&P
Department of Psychiatry  History and Physical - Adult     CHIEF COMPLAINT:    Chief Complaint   Patient presents with    Psychiatric Evaluation     +SI no plan. Patient states \"I don't care about life anymore\" -HI, -hallucinations, PS by PD.        Patient was seen after discussing with the treatment team and reviewing the chart    CIRCUMSTANCES OF ADMISSION:     Patient name: Lukas Foss  Patient's past mental health and addiction history: History of bipolar disorder, autism spectrum disorder, and electro disability with multiple inpatient psychiatric hospitalizations  Patient's presentation to the ED and why the patient needs admission: Patient made involuntary status by PD endorse suicidal ideation to run into traffic  Legal status:  []  Voluntary  [x]  Involuntary  []  Probate  Triggering/precipitating events: [place] patient endorsed suicidal ideation  Duration of triggering/precipitating events: Days ago    HISTORY OF PRESENT ILLNESS:      The patient is a 19 y.o. male with significant past history of bipolar disorder, autism spectrum disorder, intellectual disability with multiple previous inpatient psychiatric hospitalizations last here at [facility] in December 2023, patient was at [facility] most recently discharge 12/10/2024 presented to the ED after patient was picked up by PD meet involuntary status after patient endorsed suicidal ideation.  Urine drug screen positive for cannabis, alcohol negative, valproic acid level less than 3, QTc 394.  Patient was medically cleared and involuntarily admitted initially to [facility] for further psychiatric assessment stabilization and treatment    Upon evaluation today patient was seen on the unit he is walking pacing up and down the halls he is irritable, easily upset and agitated, guarded, evasive.  When asking patient why he presented to the ED he stated that he was brought here by the

## 2024-12-31 NOTE — ED PROVIDER NOTES
HPI:  12/30/24,   Time: 8:30 PM ANTHONY Foss is a 19 y.o. male presenting to the ED for psych eval, beginning days ago.  The complaint has been persistent, moderate in severity, and worsened by nothing.  Bib ems, pink slipped by pd.  Pd states si, pt denies.  Psych hx.  No hallucinations.  No n/v/dabd pain/cp    Review of Systems:   Pertinent positives and negatives are stated within HPI, all other systems reviewed and are negative.          --------------------------------------------- PAST HISTORY ---------------------------------------------  Past Medical History:  has a past medical history of ADHD (attention deficit hyperactivity disorder), Autism disorder, Bipolar 1 disorder (Formerly [facility]), GERD (gastroesophageal reflux disease), Hypotonia, Intermittent explosive disorder, Microcephalic (Formerly [facility]), and Schizoaffective disorder (Formerly [facility]).    Past Surgical History:  has no past surgical history on file.    Social History:  reports that he has quit smoking. His smoking use included cigarettes. He has a 6 pack-year smoking history. He has never used smokeless tobacco. He reports that he does not currently use alcohol after a past usage of about 15.0 standard drinks of alcohol per week. He reports that he does not currently use drugs after having used the following drugs: Marijuana (Weed). Frequency: 7.00 times per week.    Family History: family history is not on file.     The patient’s home medications have been reviewed.    Allergies: Strawberry        ---------------------------------------------------PHYSICAL EXAM--------------------------------------    Constitutional/General: Alert and oriented x3, well appearing, non toxic in NAD  Head: Normocephalic and atraumatic  Eyes: PERRL, EOMI, conjunctive normal, sclera non icteric  Mouth: Oropharynx clear, handling secretions,   Neck: Supple, full ROM,   Respiratory:. Not in respiratory distress  Cardiovascular:  Regular rate. Regular rhythm.  2+ distal

## 2024-12-31 NOTE — BH NOTE
[facility]  Admission Note     Admission Type: Involuntary       Reason for admission:Suicidal Ideation         Addictive Behavior: none       Medical Problems:   Past Medical History:   Diagnosis Date    ADHD (attention deficit hyperactivity disorder)     Autism disorder     Bipolar 1 disorder (Formerly [facility])     GERD (gastroesophageal reflux disease)     Hypotonia     Intermittent explosive disorder     Microcephalic ([facility]ly [facility])     Schizoaffective disorder ([facility]ly [facility])        Status EXAM:  Mental Status and Behavioral Exam  Normal: No  Level of Assistance: Independent/Self  Facial Expression: Hostile  Affect: Appropriate  Level of Consciousness: Alert  Frequency of Checks: 4 times per hour, close  Mood:Normal: No  Mood: Anxious  Motor Activity:Normal: No  Motor Activity: Agitated  Eye Contact: Fair  Observed Behavior: Agitated  Sexual Misconduct History: Current - no  Preception: [place] to person, [place] to time, [place] to place, [place] to situation  Attention:Normal: No  Attention: Distractible  Thought Processes: Circumstantial  Thought Content:Normal: No  Thought Content: Preoccupations  Depression Symptoms: No problems reported or observed.  Anxiety Symptoms: Generalized  Shilpa Symptoms: No problems reported or observed.  Hallucinations: None  Delusions: No  Memory:Normal: Yes  Insight and Judgment: No  Insight and Judgment: Poor judgment, Poor insight    Tobacco Screening:  Practical Counseling, on admission, silvana X, if applicable and completed (first 3 are required if patient doesn't refuse)            ( ) Recognizing danger situations (included triggers and roadblocks)                    ( ) Coping skills (new ways to manage stress,relaxation techniques, changing routine, distraction)                                                           ( ) Basic information about quitting (benefits of quitting, techniques in how to quit, available resources  ( ) [facility]

## 2024-12-31 NOTE — VIRTUAL HEALTH
Risk - PD pink slip for SI  CSSR-S Screening: no risk - patient denies, but then said, \"not anymore\"    Brief ClinicalSummary:   Patient is a 19 y.o.  male who presents for psych eval. Patient states he was standing at the bus stop & the police picked him up. PD pink slip / invol hold for SI. Pt denied SI, then stated, \"not anymore.\" He denied past suicide attempts, HI, & AVH. He is single & lives with friends. He reported past IP psych admission & no current OP services with a psychiatrist or therapist, but wanted to follow-up OP. He is not any current psych meds.       Dx:   bipolar    Plan:  Collateral: Unable to obtain collateral; none on filed; refused  Inpatient psychiatric admission at appropriate care level facility, once medically cleared and stable  Safety plan created and reviewed with patient, see below for details  Re-consult for any new changes or concerns. Thank you for this consult.  Discussed recommendations with Dr. Zhou at time of consult completion.    TelePsych recommendations:Inpatient psychiatric admission      Safety Plan:  updated        Electronically signed by ALO Almazan on 12/30/2024 at 9:14 PM.        Lukas Hernandezjaime, was evaluated through a synchronous (real-time) audio-video encounter. The patient (and/or guardian if applicable) is aware that this is a billable service, which includes applicable co-pays. This virtual visit was conducted with patient's (and/or legal guardian's) consent. Patient identification was verified, and a caregiver was present when appropriate.  The patient was located at Facility (Appt Department): [facility] EMERGENCY DEPARTMENT  [address] [address]  Loc: [phone]  The provider was located at Home (City/State): [place]  Confirm you are appropriately licensed, registered, or certified to deliver care in the state where the patient is located as

## 2024-12-31 NOTE — BH NOTE
Patient screaming on unit about wanting discharged. Screaming into phone, yelling that he was wrongly pink slipped, swearing, getting louder and louder. Patient told assertively by this nurse that he was getting loud. Patient got angry, became verbally threatening. \"Don't walk up on me,\" and \"I ain't the brendan to walk up on.\" Aggressive demeanor. Attempts to talk to patient calmly from that point unsuccessful. Orders obtained, patient given IM Haldol 5 mg, Ativan 1 mg, and Benadryl 25 mg to left and right deltoids, see eMar. After injections in patient's room, patient complimented this nurse's tattoos and began to calm down. Will monitor for medication effectiveness and behaviors.

## 2024-12-31 NOTE — DISCHARGE INSTRUCTIONS
Patient was offered a referral to substance abuse treatment, patient declined referral at this time.      Follow up for Tobacco Cessation at:    [facility]                                 Date:  Friday 1/10 at 10am              [address] [address]   (Inside [facility]    take B elevators to 7th floor)   Phone: [phone]   Fax: [phone]

## 2024-12-31 NOTE — PROGRESS NOTES
Patient declined verbal invitation to the following group    Education- coping skills    Patient will continue to be provided with opportunities to enhance leisure skills/interests and/or coping mechanisms.

## 2025-01-01 PROCEDURE — 6370000000 HC RX 637 (ALT 250 FOR IP)

## 2025-01-01 PROCEDURE — 6370000000 HC RX 637 (ALT 250 FOR IP): Performed by: PSYCHIATRY & NEUROLOGY

## 2025-01-01 PROCEDURE — 99232 SBSQ HOSP IP/OBS MODERATE 35: CPT

## 2025-01-01 PROCEDURE — 6360000002 HC RX W HCPCS: Performed by: PSYCHIATRY & NEUROLOGY

## 2025-01-01 PROCEDURE — 1240000000 HC EMOTIONAL WELLNESS R&B

## 2025-01-01 PROCEDURE — 6360000002 HC RX W HCPCS: Performed by: NURSE PRACTITIONER

## 2025-01-01 RX ADMIN — RISPERIDONE 1 MG: 0.5 TABLET, ORALLY DISINTEGRATING ORAL at 20:44

## 2025-01-01 RX ADMIN — DIVALPROEX SODIUM 250 MG: 250 TABLET, DELAYED RELEASE ORAL at 09:36

## 2025-01-01 RX ADMIN — RISPERIDONE 1 MG: 0.5 TABLET, ORALLY DISINTEGRATING ORAL at 09:36

## 2025-01-01 RX ADMIN — LORAZEPAM 1 MG: 2 INJECTION INTRAMUSCULAR; INTRAVENOUS at 12:00

## 2025-01-01 RX ADMIN — HALOPERIDOL LACTATE 5 MG: 5 INJECTION, SOLUTION INTRAMUSCULAR at 12:00

## 2025-01-01 RX ADMIN — DIVALPROEX SODIUM 250 MG: 250 TABLET, DELAYED RELEASE ORAL at 20:43

## 2025-01-01 RX ADMIN — DIPHENHYDRAMINE HYDROCHLORIDE 25 MG: 50 INJECTION INTRAMUSCULAR; INTRAVENOUS at 11:59

## 2025-01-01 RX ADMIN — HYDROXYZINE HYDROCHLORIDE 50 MG: 50 TABLET ORAL at 20:44

## 2025-01-01 ASSESSMENT — PAIN SCALES - GENERAL: PAINLEVEL_OUTOF10: 0

## 2025-01-01 NOTE — PROGRESS NOTES
BEHAVIORAL HEALTH FOLLOW-UP NOTE     1/1/2025     Patient was seen and examined in person, Chart reviewed   Patient's case discussed with staff/team    Chief Complaint: Patient was made involuntary status by PD he was endorsing suicidal ideation    Interim History:   Patient was seen out on the unit multiple times he remains intrusive he continues to follow me around asking the same questions he remains very discharged focused.  He is irritable.  He states multiple times that he is from [place] he states that he should be in the [place] area he states he needs to get closer to home.  He states that he wants to to go to the crisis unit told patient crisis unit is in [place].  He needs redirected multiple times, he will follow me around standing while I am talking with other patients.  Yesterday patient was on the unit screaming demanding discharge he became increasingly loud and verbally threatening he then received IM Haldol 5 mg, Ativan 1 mg, Benadryl 25 mg.  Patient remains very easily upset and agitated.  He does denies suicidal ideation, denies homicidal ideation denies auditory visual hallucinations.  He continues to state that he should not even be on the unit.  He has been taking his medication.  Sleep and appetite ported to be fair    Appetite: [x] Normal/Unchanged  [] Increased  [] Decreased      Sleep:       [x] Normal/Unchanged  [] Fair       [] Poor              Energy:    [x] Normal/Unchanged  [] Increased  [] Decreased        SI [] Present  [x] Absent    HI  []Present  [x] Absent     Aggression:  [] yes  [x] no    Patient is [x] able  [] unable to CONTRACT FOR SAFETY     PAST MEDICAL/PSYCHIATRIC HISTORY:   Past Medical History:   Diagnosis Date    ADHD (attention deficit hyperactivity disorder)     Autism disorder     Bipolar 1 disorder (HCC)     GERD (gastroesophageal reflux disease)     Hypotonia     Intermittent explosive disorder     Microcephalic (HCC)     Schizoaffective disorder (HCC)

## 2025-01-01 NOTE — BH NOTE
Patient pacing the unit. Boisterous, loudly swearing, using obscenities, demanding to see SAM Stoner. Continuously and repeatedly demanding staff over and over to tell him where SAM Stoner is. Complaining that Georgiana is not here, \"get here here NOW,\" and \"open the fuckin doors NOW\" and \"discharge me NOW.\" Walking up to the unit doors, pounding fists on doors, banged head on door. Observed staff RN continually attempt to de-escalate patient without success, patient emotions becoming more out of control and escalated. Ripped his patient folder up into pieces at the nurse's station counter. Ripped his armbands off and threw them over the counter. Began punching fists off of counter and then on nurse station glass. Intervention by this nurse was to escort to the quiet room, patient walked with me immediately and sat on quiet room bed. Continued to make complaints, \"let me the fuck out of here\" and \"give me my vape, my vape, my vape, my vape.\" Also \"I'm gonna crack somebody's fuckin' head out there.\" Given Ativan 1 mg, Haldol 5 mg, and Benadryl 25 mg to left and right deltoids IM. Patient left to speak therapeutically in quiet room with staff RN. Emotions currently under control, behavior de-escalated at this time. Will monitor for medication effectiveness.

## 2025-01-01 NOTE — GROUP NOTE
Group Therapy Note    Date: 1/1/2025    Group Start Time: 0930  Group End Time: 0945  Group Topic: Community Meeting    Yeni Hodges [facility]    Group Therapy Note    Attendees: 8    Date: 1/1/2025  Start Time: 0930  End Time:  0945  Number of Participants: 8    Type of Group: Community Meeting    Patient's Goal:  Increased awareness of functions of the milieu, daily staffing and programming. Identified goal for the day.    Notes:  Patient often entered and exited group. Patient observed pacing unit while not at group. Patient shared goal for the day as, \"To not act up.\"    Status After Intervention:  Unchanged    Participation Level: Minimal    Participation Quality: Sharing      Speech:  normal      Thought Process/Content: Logical      Affective Functioning: Congruent      Mood: anxious      Level of consciousness:  Preoccupied and Inattentive      Response to Learning: Resistant      Endings: None Reported    Modes of Intervention: Education, Support, Socialization, Exploration, Clarifying, and Problem-solving      Discipline Responsible: Psychoeducational Specialist      Signature:  JILL Westbrook    
                                                                         Group Therapy Note    Date: 1/1/2025    Group Start Time: 0945  Group End Time: 1015  Group Topic: Psychoeducation    Yeni Hodges [facility]S    Group Therapy Note    Attendees: 8    Date: 1/1/2025  Start Time: 0945  End Time:  1015  Number of Participants: 8    Type of Group: Psychoeducation    Name:  Growth Mindset vs Fixed Mindset    Patient's Goal:  Identified differences between having a growth vs fixed mindset.    Notes:  Patient often entered and exited group. Patient observed pacing unit while not at group. Patient did not add to group discussion.    Status After Intervention:  Unchanged    Participation Level: None    Participation Quality: Resistant      Speech:  None      Thought Process/Content: None observed      Affective Functioning: Congruent      Mood: anxious      Level of consciousness:  Preoccupied and Inattentive      Response to Learning: Resistant      Endings: None Reported    Modes of Intervention: Education, Support, Socialization, Exploration, Clarifying, and Problem-solving      Discipline Responsible: Psychoeducational Specialist      Signature:  JILL Westbrook    
                                                                  Group Therapy Note    Date: 12/31/2024  Start Time: 0745  End Time:  0805  Number of Participants: 9    Type of Group: Community Meeting    Wellness Binder Information  Module Name:  Community Meeting      Patient's Goal:  Patient will be oriented to the unit including but not limited expectations, staff, programming schedule.  Patient will be able to ID expectations of treatment.     Notes: Patient was a participant in community meeting, and was updated on expectations of the unit, staffing, programming schedule, and acclimated to their environment.    Patient shared goal for today as \"to leave and go to the crisis unit\"    Status After Intervention:  Unchanged    Participation Level: Active Listener and Interactive    Participation Quality: Appropriate and Attentive      Speech:  normal      Thought Process/Content: Logical      Affective Functioning: Congruent      Mood: irritable      Level of consciousness:  Alert and Attentive      Response to Learning: Able to verbalize current knowledge/experience and Able to verbalize/acknowledge new learning      Endings: None Reported    Modes of Intervention: Exploration, Clarifying, and Problem-solving      Discipline Responsible: Recreational Therapist      Signature:  JILL Becerra    
Self/Parent(s)

## 2025-01-02 LAB
CHOLEST SERPL-MCNC: 91 MG/DL
HBA1C MFR BLD: 5.1 % (ref 4–5.6)
HDLC SERPL-MCNC: 26 MG/DL
LDLC SERPL CALC-MCNC: 53 MG/DL
TRIGL SERPL-MCNC: 62 MG/DL
VLDLC SERPL CALC-MCNC: 12 MG/DL

## 2025-01-02 PROCEDURE — 83036 HEMOGLOBIN GLYCOSYLATED A1C: CPT

## 2025-01-02 PROCEDURE — 6370000000 HC RX 637 (ALT 250 FOR IP): Performed by: PSYCHIATRY & NEUROLOGY

## 2025-01-02 PROCEDURE — 6370000000 HC RX 637 (ALT 250 FOR IP): Performed by: NURSE PRACTITIONER

## 2025-01-02 PROCEDURE — 6370000000 HC RX 637 (ALT 250 FOR IP)

## 2025-01-02 PROCEDURE — 36415 COLL VENOUS BLD VENIPUNCTURE: CPT

## 2025-01-02 PROCEDURE — 1240000000 HC EMOTIONAL WELLNESS R&B

## 2025-01-02 PROCEDURE — 80061 LIPID PANEL: CPT

## 2025-01-02 RX ORDER — DIVALPROEX SODIUM 500 MG/1
500 TABLET, DELAYED RELEASE ORAL EVERY 12 HOURS SCHEDULED
Status: DISCONTINUED | OUTPATIENT
Start: 2025-01-02 | End: 2025-01-03 | Stop reason: HOSPADM

## 2025-01-02 RX ORDER — POLYETHYLENE GLYCOL 3350 17 G
2 POWDER IN PACKET (EA) ORAL
Status: DISCONTINUED | OUTPATIENT
Start: 2025-01-02 | End: 2025-01-03 | Stop reason: HOSPADM

## 2025-01-02 RX ORDER — BENZTROPINE MESYLATE 1 MG/1
1 TABLET ORAL 2 TIMES DAILY
Status: DISCONTINUED | OUTPATIENT
Start: 2025-01-02 | End: 2025-01-03 | Stop reason: HOSPADM

## 2025-01-02 RX ORDER — RISPERIDONE 2 MG/1
2 TABLET, ORALLY DISINTEGRATING ORAL 2 TIMES DAILY
Status: COMPLETED | OUTPATIENT
Start: 2025-01-02 | End: 2025-01-02

## 2025-01-02 RX ORDER — RISPERIDONE 2 MG/1
2 TABLET, ORALLY DISINTEGRATING ORAL 2 TIMES DAILY
Status: DISCONTINUED | OUTPATIENT
Start: 2025-01-02 | End: 2025-01-02

## 2025-01-02 RX ADMIN — NICOTINE POLACRILEX 2 MG: 2 LOZENGE ORAL at 10:43

## 2025-01-02 RX ADMIN — DIVALPROEX SODIUM 250 MG: 250 TABLET, DELAYED RELEASE ORAL at 08:46

## 2025-01-02 RX ADMIN — RISPERIDONE 2 MG: 2 TABLET, ORALLY DISINTEGRATING ORAL at 20:14

## 2025-01-02 RX ADMIN — DIVALPROEX SODIUM 500 MG: 500 TABLET, DELAYED RELEASE ORAL at 20:14

## 2025-01-02 RX ADMIN — BENZTROPINE MESYLATE 1 MG: 1 TABLET ORAL at 20:14

## 2025-01-02 RX ADMIN — BENZTROPINE MESYLATE 1 MG: 1 TABLET ORAL at 14:24

## 2025-01-02 RX ADMIN — HYDROXYZINE HYDROCHLORIDE 50 MG: 50 TABLET ORAL at 20:14

## 2025-01-02 RX ADMIN — NICOTINE POLACRILEX 2 MG: 2 LOZENGE ORAL at 17:01

## 2025-01-02 RX ADMIN — RISPERIDONE 1 MG: 0.5 TABLET, ORALLY DISINTEGRATING ORAL at 08:46

## 2025-01-02 RX ADMIN — Medication 3 MG: at 20:14

## 2025-01-02 NOTE — PROGRESS NOTES
BEHAVIORAL HEALTH FOLLOW-UP NOTE     1/2/2025     Patient was seen and examined in person, Chart reviewed   Patient's case discussed with staff/team    Chief Complaint: Patient was made involuntary status by PD he was endorsing suicidal ideation    Interim History:   Patient seen this morning up on the unit.  He continues to demonstrate extremely poor impulse control poor insight and judgment he remains focused on discharge he is encouraged to attend groups to maintain control of his behaviors however he continues to be intrusive and threat in order to get what he wants.  He states that he lives at a house in [place] however he was heard up on the unit loud threatening on the phone.  He later asked to go the crisis unit because he tells me he is not able to return back to that house on discharge.  He is irritable he paces the unit he has poor insight and judgment and he is impulsive denies SI/HI intent or plan denies auditory or visual hallucinations      Appetite: [x] Normal/Unchanged  [] Increased  [] Decreased      Sleep:       [x] Normal/Unchanged  [] Fair       [] Poor              Energy:    [x] Normal/Unchanged  [] Increased  [] Decreased        SI [] Present  [x] Absent    HI  []Present  [x] Absent     Aggression:  [] yes  [x] no    Patient is [x] able  [] unable to CONTRACT FOR SAFETY     PAST MEDICAL/PSYCHIATRIC HISTORY:   Past Medical History:   Diagnosis Date    ADHD (attention deficit hyperactivity disorder)     Autism disorder     Bipolar 1 disorder (HCC)     GERD (gastroesophageal reflux disease)     Hypotonia     Intermittent explosive disorder     Microcephalic (HCC)     Schizoaffective disorder (HCC)        FAMILY/SOCIAL HISTORY:  History reviewed. No pertinent family history.  Social History     Socioeconomic History    Marital status: Single     Spouse name: Not on file    Number of children: 2    Years of education: 11    Highest education level: Not on file   Occupational History    Not on file

## 2025-01-02 NOTE — PROGRESS NOTES
Received a return call from Hanny from [facility]. Hanny requesting fax number for unit to send medication verification information.     Per Hanny's fax, \"Client missed 5/17/2024 [facility] appt as well as 6/10/2024, 8/6/2024, and 8/15/2024 rescheduled psych appointments. Client was last seen 4/29/2024. Client last received injection 2/13/2024.\"    Fax placed in patient's soft chart.

## 2025-01-02 NOTE — BH NOTE
Patient alert and oriented x4. Patient constantly walking up to nurses station asking about his discharge. It was told to patient multiple times that patient does not have a discharge today. Patient was at first irritable with staff, pacing unit. Patient was redirected and expectations were set with patient. Patient was able to regain control of his emotions. Patient currently sitting in dining room talking with peers.

## 2025-01-02 NOTE — BH NOTE
Pt resting in bed upon approach.  Pt has fair eye contact.    Denies SI/HI, A/V/H, or thoughts of self harm when asked.    Rates anxiety at 0/10 and depression at 0/10.    Denies pain. No noted signs or symptoms of distress.  Pt is compliant with PM medication.  According to previous notes pt attended 2 daytime groups.    Purposeful Q15min rounding continues.

## 2025-01-03 VITALS
SYSTOLIC BLOOD PRESSURE: 110 MMHG | WEIGHT: 150 LBS | HEART RATE: 56 BPM | DIASTOLIC BLOOD PRESSURE: 52 MMHG | BODY MASS INDEX: 22.22 KG/M2 | RESPIRATION RATE: 15 BRPM | OXYGEN SATURATION: 98 % | TEMPERATURE: 98.4 F | HEIGHT: 69 IN

## 2025-01-03 PROCEDURE — 6370000000 HC RX 637 (ALT 250 FOR IP): Performed by: NURSE PRACTITIONER

## 2025-01-03 RX ORDER — POLYETHYLENE GLYCOL 3350 17 G
2 POWDER IN PACKET (EA) ORAL
COMMUNITY
Start: 2025-01-03

## 2025-01-03 RX ORDER — BENZTROPINE MESYLATE 1 MG/1
1 TABLET ORAL 2 TIMES DAILY
Qty: 60 TABLET | Refills: 0 | Status: SHIPPED | OUTPATIENT
Start: 2025-01-03 | End: 2025-02-02

## 2025-01-03 RX ORDER — DIVALPROEX SODIUM 500 MG/1
500 TABLET, DELAYED RELEASE ORAL EVERY 12 HOURS SCHEDULED
Qty: 90 TABLET | Refills: 3 | Status: SHIPPED | OUTPATIENT
Start: 2025-01-03

## 2025-01-03 RX ADMIN — BENZTROPINE MESYLATE 1 MG: 1 TABLET ORAL at 08:34

## 2025-01-03 RX ADMIN — DIVALPROEX SODIUM 500 MG: 500 TABLET, DELAYED RELEASE ORAL at 08:34

## 2025-01-03 RX ADMIN — NICOTINE POLACRILEX 2 MG: 2 LOZENGE ORAL at 06:46

## 2025-01-03 NOTE — PLAN OF CARE
Problem: Anxiety  Goal: Will report anxiety at manageable levels  Description: INTERVENTIONS:  1. Administer medication as ordered  2. Teach and rehearse alternative coping skills  3. Provide emotional support with 1:1 interaction with staff  12/31/2024 2145 by Cristela Logan RN  Outcome: Progressing     Problem: Depression/Self Harm  Goal: Effect of psychiatric condition will be minimized and patient will be protected from self harm  Description: INTERVENTIONS:  1. Assess impact of patient's symptoms on level of functioning, self care needs and offer support as indicated  2. Assess patient/family knowledge of depression, impact on illness and need for teaching  3. Provide emotional support, presence and reassurance  4. Assess for possible suicidal thoughts or ideation. If patient expresses suicidal thoughts or statements do not leave alone, initiate Suicide Precautions, move to a room close to the nursing station and obtain sitter  5. Initiate consults as appropriate with Mental Health Professional, Spiritual Care, Psychosocial CNS, and consider a recommendation to the LIP for a Psychiatric Consultation  Outcome: Progressing   Pt states no suicidal ideations, homicidal ideations, hallucinations. Pt attended 1 group, is taking medications.  Pt. was cooperative after having medication, is still irritable.  
  Problem: Anxiety  Goal: Will report anxiety at manageable levels  Description: INTERVENTIONS:  1. Administer medication as ordered  2. Teach and rehearse alternative coping skills  3. Provide emotional support with 1:1 interaction with staff  Outcome: Progressing     Problem: Coping  Goal: Pt/Family able to verbalize concerns and demonstrate effective coping strategies  Description: INTERVENTIONS:  1. Assist patient/family to identify coping skills, available support systems and cultural and spiritual values  2. Provide emotional support, including active listening and acknowledgement of concerns of patient and caregivers  3. Reduce environmental stimuli, as able  4. Instruct patient/family in relaxation techniques, as appropriate  5. Assess for spiritual pain/suffering and initiate Spiritual Care, Psychosocial Clinical Specialist consults as needed  Outcome: Progressing     Problem: Decision Making  Goal: Pt/Family able to effectively weigh alternatives and participate in decision making related to treatment and care  Description: INTERVENTIONS:  1. Determine when there are differences between patient's view, family's view, and healthcare provider's view of condition  2. Facilitate patient and family articulation of goals for care  3. Help patient and family identify pros/cons of alternative solutions  4. Provide information as requested by patient/family  5. Respect patient/family right to receive or not to receive information  6. Serve as a liaison between patient and family and health care team  7. Initiate Consults from Ethics, Palliative Care or initiate Family Care Conference as is appropriate  Outcome: Progressing     
  Problem: Risk for Elopement  Goal: Patient will not exit the unit/facility without proper excort  Outcome: Progressing     Problem: Anxiety  Goal: Will report anxiety at manageable levels  Description: INTERVENTIONS:  1. Administer medication as ordered  2. Teach and rehearse alternative coping skills  3. Provide emotional support with 1:1 interaction with staff  1/1/2025 2115 by Ubaldo Mcconnell RN  Outcome: Progressing  1/1/2025 1544 by Francisca Griffin RN  Outcome: Progressing     Problem: Coping  Goal: Pt/Family able to verbalize concerns and demonstrate effective coping strategies  Description: INTERVENTIONS:  1. Assist patient/family to identify coping skills, available support systems and cultural and spiritual values  2. Provide emotional support, including active listening and acknowledgement of concerns of patient and caregivers  3. Reduce environmental stimuli, as able  4. Instruct patient/family in relaxation techniques, as appropriate  5. Assess for spiritual pain/suffering and initiate Spiritual Care, Psychosocial Clinical Specialist consults as needed  1/1/2025 2115 by Ubaldo Mcconnell RN  Outcome: Progressing  1/1/2025 1544 by Francisca Griffin RN  Outcome: Progressing     Problem: Decision Making  Goal: Pt/Family able to effectively weigh alternatives and participate in decision making related to treatment and care  Description: INTERVENTIONS:  1. Determine when there are differences between patient's view, family's view, and healthcare provider's view of condition  2. Facilitate patient and family articulation of goals for care  3. Help patient and family identify pros/cons of alternative solutions  4. Provide information as requested by patient/family  5. Respect patient/family right to receive or not to receive information  6. Serve as a liaison between patient and family and health care team  7. Initiate Consults from Ethics, Palliative Care or initiate Family Care Conference as is 
  Problem: Risk for Elopement  Goal: Patient will not exit the unit/facility without proper excort  Outcome: Progressing     Problem: Anxiety  Goal: Will report anxiety at manageable levels  Description: INTERVENTIONS:  1. Administer medication as ordered  2. Teach and rehearse alternative coping skills  3. Provide emotional support with 1:1 interaction with staff  Outcome: Progressing     Problem: Coping  Goal: Pt/Family able to verbalize concerns and demonstrate effective coping strategies  Description: INTERVENTIONS:  1. Assist patient/family to identify coping skills, available support systems and cultural and spiritual values  2. Provide emotional support, including active listening and acknowledgement of concerns of patient and caregivers  3. Reduce environmental stimuli, as able  4. Instruct patient/family in relaxation techniques, as appropriate  5. Assess for spiritual pain/suffering and initiate Spiritual Care, Psychosocial Clinical Specialist consults as needed  Outcome: Progressing     
Pt resting in bed apparently asleep with easy even respirations at HS q 15 min electronic rounding.    
Conference as is appropriate  Outcome: Progressing     
denies SI, HI and AVH. Pt was agitated this AM. Pt refused to deescalate. Behaviors increased to pounding and being boisterous on the unit. Demanding to leave and demanding to be seen again by the NP. Pt asking to be transferred back to [facility] d/t \"My families over there.\" Per [facility] staff, it is his cousins gf's sister or of the ilk. Pt labile and tearful. PRNs given earlier. Pt calm and cooperative thus far. Resting in bed no sign of distress. Encouraged positive behaviors pt stated \"I shouldn't be here.\" Emotional support given. Pt stated it was a \"bad flaco.\" Medication compliant. Will continue to monitor.

## 2025-01-03 NOTE — DISCHARGE SUMMARY
paper prescription for each of these medications  risperiDONE ER subcutaneous injection  You don't need a prescription for these medications  melatonin 3 MG Tabs tablet  nicotine polacrilex 2 MG lozenge     Patient is counseled he must been compliant with all medications all outpatient follow appointments    Patient is discharged home in stable condition    NOTE: This report was transcribed using voice recognition software. Every effort was made to ensure accuracy; however, inadvertent computerized transcription errors may be present.     TIME SPEND - 35 MINUTES TO COMPLETE THE EVALUATION, DISCHARGE SUMMARY, MEDICATION RECONCILIATION AND FOLLOW UP CARE     Signed:  NAN Aceves CNP  1/3/2025  10:33 AM

## 2025-01-03 NOTE — PROGRESS NOTES
CLINICAL PHARMACY NOTE: MEDS TO BEDS    Total # of Prescriptions Filled: 2   The following medications were delivered to the patient:  Benztropine 1 mg  Divalproex dr 500 mg    Additional Documentation:   RN picked up in pharmacy

## 2025-01-03 NOTE — TRANSITION OF CARE
Behavioral Health Transition Record    Patient Name: Lukas Foss  YOB: 2005   Medical Record Number: [id number]  Date of Admission: 12/30/2024  7:32 PM   Date of Discharge: 1/3/2024    Attending Provider: Sohail Reed MD   Discharging Provider: Sohail Reed MD  To contact this individual call [phone] and ask the  to page.  If unavailable, ask to be transferred to Behavioral Health Provider on call.  A Behavioral Health Provider will be available on call 24/7 and during holidays.    Primary Care Provider: No primary care provider on file.    Allergies   Allergen Reactions    Strawberry Rash       Reason for Admission: Patient name: Lukas Foss  Patient's past mental health and addiction history: History of bipolar disorder, autism spectrum disorder, and electro disability with multiple inpatient psychiatric hospitalizations  Patient's presentation to the ED and why the patient needs admission: Patient made involuntary status by PD endorse suicidal ideation to run into traffic  Legal status:  []  Voluntary  [x]  Involuntary  []  Probate  Triggering/precipitating events: Noncompliance with outpatient mental health treatment and noncompliance with medications.   Duration of triggering/precipitating events: Days ago       Admission Diagnosis: Suicidal ideations [R45.851]  Depression, unspecified depression type [F32.A]  Suicidal ideation [R45.851]    * No surgery found *    Results for orders placed or performed during the hospital encounter of 12/30/24   CBC with Auto Differential   Result Value Ref Range    WBC 7.6 4.5 - 11.5 k/uL    RBC 5.52 3.80 - 5.80 m/uL    Hemoglobin 15.9 12.5 - 16.5 g/dL    Hematocrit 48.1 37.0 - 54.0 %    MCV 87.1 80.0 - 99.9 fL    MCH 28.8 26.0 - 35.0 pg    MCHC 33.1 32.0 - 34.5 g/dL    RDW 12.5 11.5 - 15.0 %    Platelets 239 130 - 450 k/uL    MPV 8.9 7.0 - 12.0 fL    Neutrophils % 55 43.0 - 80.0 %    Lymphocytes % 25 20.0 - 42.0 %

## 2025-01-03 NOTE — CARE COORDINATION
Biopsychosocial Assessment Note    Social work met with patient to complete the biopsychosocial assessment and C-SSRS.     Chief Complaint: pt reports \"no reason.\"     Mental Status Exam: pt alert&oriented x4. Pt superficially cooperative, easily agitated. Pt mood irritable, anxious, blunt affect. Pt eye contact was poor. Pt speech was loud and agitated. Pt thoughts circumstantial, preoccupied, discharge focused. Pt insight/judgement poor. Pt denied SI/HI/AVH.     Clinical Summary: pt stated he is in the hospital for \"no reason\" and the police \"lied on me.\" Pt repeatedly stated he needs to be discharged because he does not feel comfortable here and \"you are going to have a lawsuit on your hands.\" Pt wants to be discharged to [facility] however this facility is temporarily closed. Pt denied any recent stressors. Per ER provider note, \"19 y.o. male presenting to the ED for psych eval, beginning days ago.  The complaint has been persistent, moderate in severity, and worsened by nothing.  Bib ems, pink slipped by pd.  Pd states si, pt denies.  Psych hx.  No hallucinations.  No n/v/dabd pain/cp\"     Pt has a hx of inpatient psychiatric admissions but he \"doesn't know\" when or where his last admission was. Per chart, pt was discharged from [facility] on 12/10/2024. Pt is active with [facility] and he reports med compliance. Pt reports a hx of suicidal thoughts but he has not felt suicidal \"in awhile.\" Pt denied any hx of suicide attempts or self-injurious behaviors. Per chart, pt reports a hx of suicide attempts. Pt denied trauma/abuse hx. Pt denied drug or alcohol use. Pt UDS positive for cannabis. Pt denied legal hx. Per chart, \"Patient is currently on probation and has a restraining order against him after throwing a brick through a window.\" Per chart, \"pt has a hx of aggravated menacing, criminal damage, and criminal trespassing\"     Pt completed the 
In order to ensure appropriate transition and discharge planning is in place, the following documents have been transmitted to [place], as the new outpatient provider:    The d/c diagnosis was transmitted to the next care provider  The reason for hospitalization was transmitted to the next care provider  The d/c medications (dosage and indication) were transmitted to the next care provider   The continuing care plan was transmitted to the next care provider    
JUAN and RN met with pt who agreed to sign NINA for Jose A Qiu. He states that this is a staff member for his friend Lukas who is deaf whom he lives with. He states that he is agreeable to either discharge to [facility] or to Lukas's home at discharge depending on what treatment team prefers he do.     JUAN called Jose A [phone] to gain collateral.  No answer, JUAN left voicemail.   
Pt and SW called pt friend Lukas who states that he has no concerns for pt and that pt is able to return to his home at [address] [facility] in [place], [place]. He states that pt typically uses the bus for transport. He denied pt access to guns/weapons. He is hopeful pt will be able to discharge soon.     Pt states that he is hopeful to discharge to friend Lukas's house soon and states that he will use the bus for transport. He denied questions or concerns for SW at this time.      JUAN called [facility] [phone] to schedule appointment for pt. Pt has appointments for crisis support 1/10 at 8am via phone and 1/23 at 1pm in office for medication management.   
Pt approached SW and revoked his NINA for Jose A. He states that there is no one else that SW is able to contact at this time, declined to sign NINA. He is discharge focused and states several times that he needs to go to the crisis unit today. SW offered emotional support, advised pt that there is not a discharge scheduled for today, but that SW would keep him updated.   
Pt approached SW and stated that he is hopeful to discharge today. He states that he plans to stay with his friend Lukas at discharge and use the bus for transport. He denied SI/HI/AVH. Pt is calm and cooperative, future focused. Pt plans to follow up with [facility].     Pt and SW called pt friend Lukas again who confirmed pt is able to stay with him at discharge and again denied concerns for pt at this time.       
Pt was seen during treatment team. Pt is calm and cooperative, future oriented, states that he is hopeful to discharge home today with his friend and that he will use the bus for transport. He declined [employer] ride as he does not want to wait for this. He states that bus his is primary mode of transportation, so he is comfortable with this option. Pt denied SI/HI/AVH. He denied questions or concerns for treatment team. Pt plans to follow up with [facility].     SW called [facility] [phone] and spoke with Mady to inform her of risperiDONE ER (UZEDY) subcutaneous injection 100 mg due on 2/2. She marked this in pt chart.   
  Perception of changes needed \"go to the crisis unit.  I don't need to be here\"   Strengths and Limitations   Strengths Independent in basic self-care activities;Positive support network   Limitations Tendency to isolate self;Difficult relationships / poor social skills;Lacks leisure interests;Multiple barriers to leisure interests

## 2025-03-12 ENCOUNTER — HOSPITAL ENCOUNTER (EMERGENCY)
Age: 20
Discharge: HOME | End: 2025-03-12
Payer: COMMERCIAL

## 2025-03-12 VITALS
DIASTOLIC BLOOD PRESSURE: 69 MMHG | RESPIRATION RATE: 16 BRPM | TEMPERATURE: 98.1 F | OXYGEN SATURATION: 97 % | HEART RATE: 96 BPM | SYSTOLIC BLOOD PRESSURE: 120 MMHG

## 2025-03-12 DIAGNOSIS — L30.9 DERMATITIS: Primary | ICD-10-CM

## 2025-03-12 PROCEDURE — 99283 EMERGENCY DEPT VISIT LOW MDM: CPT

## 2025-03-12 RX ORDER — METHYLPREDNISOLONE 4 MG/1
TABLET ORAL
Qty: 21 TABLET | Refills: 0 | Status: SHIPPED | OUTPATIENT
Start: 2025-03-12 | End: 2025-03-18

## 2025-03-12 RX ORDER — TRIAMCINOLONE ACETONIDE 5 MG/G
CREAM TOPICAL
Qty: 45 G | Refills: 0 | Status: SHIPPED | OUTPATIENT
Start: 2025-03-12 | End: 2025-03-19

## 2025-03-12 ASSESSMENT — PAIN DESCRIPTION - ORIENTATION: ORIENTATION: LEFT

## 2025-03-12 ASSESSMENT — PAIN SCALES - GENERAL: PAINLEVEL_OUTOF10: 3

## 2025-03-12 ASSESSMENT — PAIN DESCRIPTION - LOCATION: LOCATION: LEG

## 2025-03-12 ASSESSMENT — LIFESTYLE VARIABLES
HOW OFTEN DO YOU HAVE A DRINK CONTAINING ALCOHOL: 2-3 TIMES A WEEK
HOW MANY STANDARD DRINKS CONTAINING ALCOHOL DO YOU HAVE ON A TYPICAL DAY: 1 OR 2

## 2025-03-12 ASSESSMENT — PAIN - FUNCTIONAL ASSESSMENT: PAIN_FUNCTIONAL_ASSESSMENT: 0-10

## 2025-03-12 ASSESSMENT — PAIN DESCRIPTION - DESCRIPTORS: DESCRIPTORS: ITCHING;DISCOMFORT;SORE

## 2025-03-12 NOTE — DISCHARGE INSTRUCTIONS
If the area is itchy please apply ice pack to it.  Medrol Dosepak as directed.  Triamcinolone cream directly to the area 3 times daily.  Please try not to scratch the area.  You may wash the area with mild soap and water, pat dry and apply the ointment

## 2025-03-24 ENCOUNTER — APPOINTMENT (OUTPATIENT)
Dept: GENERAL RADIOLOGY | Age: 20
End: 2025-03-24
Payer: COMMERCIAL

## 2025-03-24 ENCOUNTER — HOSPITAL ENCOUNTER (EMERGENCY)
Age: 20
Discharge: HOME | End: 2025-03-24
Payer: COMMERCIAL

## 2025-03-24 VITALS
WEIGHT: 150 LBS | SYSTOLIC BLOOD PRESSURE: 131 MMHG | TEMPERATURE: 97.7 F | OXYGEN SATURATION: 98 % | RESPIRATION RATE: 14 BRPM | HEART RATE: 116 BPM | DIASTOLIC BLOOD PRESSURE: 80 MMHG | BODY MASS INDEX: 22.15 KG/M2

## 2025-03-24 DIAGNOSIS — S89.92XA INJURY OF LEFT KNEE, INITIAL ENCOUNTER: Primary | ICD-10-CM

## 2025-03-24 PROCEDURE — 99283 EMERGENCY DEPT VISIT LOW MDM: CPT

## 2025-03-24 PROCEDURE — 73562 X-RAY EXAM OF KNEE 3: CPT

## 2025-03-24 PROCEDURE — 6370000000 HC RX 637 (ALT 250 FOR IP): Performed by: NURSE PRACTITIONER

## 2025-03-24 RX ORDER — ACETAMINOPHEN 500 MG
500 TABLET ORAL 4 TIMES DAILY PRN
Qty: 100 TABLET | Refills: 0 | Status: SHIPPED | OUTPATIENT
Start: 2025-03-24 | End: 2025-03-24

## 2025-03-24 RX ORDER — CYCLOBENZAPRINE HCL 10 MG
10 TABLET ORAL ONCE
Status: COMPLETED | OUTPATIENT
Start: 2025-03-24 | End: 2025-03-24

## 2025-03-24 RX ORDER — ACETAMINOPHEN 500 MG
500 TABLET ORAL 4 TIMES DAILY PRN
Qty: 100 TABLET | Refills: 0 | Status: SHIPPED | OUTPATIENT
Start: 2025-03-24

## 2025-03-24 RX ORDER — IBUPROFEN 800 MG/1
800 TABLET, FILM COATED ORAL ONCE
Status: COMPLETED | OUTPATIENT
Start: 2025-03-24 | End: 2025-03-24

## 2025-03-24 RX ORDER — IBUPROFEN 600 MG/1
600 TABLET, FILM COATED ORAL 4 TIMES DAILY PRN
Qty: 100 TABLET | Refills: 0 | Status: SHIPPED | OUTPATIENT
Start: 2025-03-24 | End: 2025-03-24

## 2025-03-24 RX ORDER — IBUPROFEN 600 MG/1
600 TABLET, FILM COATED ORAL 4 TIMES DAILY PRN
Qty: 100 TABLET | Refills: 0 | Status: SHIPPED | OUTPATIENT
Start: 2025-03-24

## 2025-03-24 RX ADMIN — IBUPROFEN 800 MG: 800 TABLET, FILM COATED ORAL at 22:50

## 2025-03-24 RX ADMIN — CYCLOBENZAPRINE 10 MG: 10 TABLET, FILM COATED ORAL at 22:49

## 2025-03-24 ASSESSMENT — PAIN DESCRIPTION - ORIENTATION
ORIENTATION: LEFT

## 2025-03-24 ASSESSMENT — PAIN DESCRIPTION - LOCATION
LOCATION: KNEE

## 2025-03-24 ASSESSMENT — PAIN - FUNCTIONAL ASSESSMENT: PAIN_FUNCTIONAL_ASSESSMENT: 0-10

## 2025-03-24 ASSESSMENT — PAIN DESCRIPTION - DESCRIPTORS
DESCRIPTORS: ACHING;DISCOMFORT;TENDER;THROBBING
DESCRIPTORS: ACHING;DISCOMFORT;SORE
DESCRIPTORS: ACHING;DISCOMFORT;SORE

## 2025-03-24 ASSESSMENT — PAIN SCALES - GENERAL
PAINLEVEL_OUTOF10: 10

## 2025-03-24 ASSESSMENT — PAIN DESCRIPTION - PAIN TYPE: TYPE: ACUTE PAIN

## 2025-03-25 NOTE — DISCHARGE INSTRUCTIONS
Elevate ice  Wear the knee immobilizer   Followup with orthpedics    Ibuprofen tylenol and muscle relaxers    Return if worse    Xray showed    HISTORY:  ORDERING SYSTEM PROVIDED HISTORY: PAIN  TECHNOLOGIST PROVIDED HISTORY:  Reason for exam:->PAIN     FINDINGS:  No evidence of acute fracture or dislocation.  No focal osseous lesion.  No  evidence of joint effusion. No focal soft tissue abnormality.     IMPRESSION:  No acute abnormality of the knee.

## 2025-03-25 NOTE — ED PROVIDER NOTES
Independent CHRISTOPHER Visit.     HPI:  3/24/25, Time: 10:07 PM EDT         Lukas Foss is a 20 y.o. male presenting to the ED for left knee sprain , beginning few hours  ago.  The complaint has been persistent, moderate in severity, and worsened by nothing.  Pt stated that he was hit by a dog into his left knee and had immediate pain.He tried otc meds and ice but did not help Painful to ambulate. Nothing else hurts. No medications.     ROS:   Pertinent positives and negatives are stated within HPI, all other systems reviewed and are negative.  --------------------------------------------- PAST HISTORY ---------------------------------------------  Past Medical History:  has a past medical history of ADHD (attention deficit hyperactivity disorder), Autism disorder, Bipolar 1 disorder (HCC), GERD (gastroesophageal reflux disease), Hypotonia, Intermittent explosive disorder, Microcephalic (HCC), and Schizoaffective disorder (HCC).    Past Surgical History:  has no past surgical history on file.    Social History:  reports that he has quit smoking. His smoking use included cigarettes. He has a 6 pack-year smoking history. He has never used smokeless tobacco. He reports current alcohol use of about 15.0 standard drinks of alcohol per week. He reports that he does not currently use drugs after having used the following drugs: Marijuana (Weed). Frequency: 7.00 times per week.    Family History: family history is not on file.     The patient’s home medications have been reviewed.    Allergies: Strawberry    ---------------------------------------------------PHYSICAL EXAM--------------------------------------    Constitutional/General: Alert and oriented x3, well appearing, non toxic in NAD  Head: Normocephalic and atraumatic  Eyes: PERRL, EOMI  Mouth: Oropharynx clear, handling secretions, no trismus  Neck: Supple, full ROM, non tender to palpation in the midline, no stridor, no crepitus, no meningeal signs  Pulmonary:

## 2025-03-25 NOTE — ED NOTES
Pt angry and yelling about caresourse and verbally escalating over his ride. Pt refused discharge papers and walked out to lobby.

## 2025-04-21 VITALS
RESPIRATION RATE: 12 BRPM | HEART RATE: 98 BPM | SYSTOLIC BLOOD PRESSURE: 126 MMHG | TEMPERATURE: 97.7 F | WEIGHT: 150 LBS | HEIGHT: 69 IN | BODY MASS INDEX: 22.22 KG/M2 | DIASTOLIC BLOOD PRESSURE: 78 MMHG | OXYGEN SATURATION: 99 %

## 2025-04-21 PROCEDURE — 99283 EMERGENCY DEPT VISIT LOW MDM: CPT

## 2025-04-21 ASSESSMENT — PAIN - FUNCTIONAL ASSESSMENT: PAIN_FUNCTIONAL_ASSESSMENT: NONE - DENIES PAIN

## 2025-04-22 ENCOUNTER — HOSPITAL ENCOUNTER (EMERGENCY)
Age: 20
Discharge: HOME | End: 2025-04-22
Payer: COMMERCIAL

## 2025-04-22 DIAGNOSIS — F99 PSYCHIATRIC COMPLAINT: Primary | ICD-10-CM

## 2025-04-22 NOTE — ED TRIAGE NOTES
Department of Emergency Medicine  FIRST PROVIDER TRIAGE NOTE             Independent MLP           4/21/25  8:15 PM EDT    Date of Encounter: 4/21/25   MRN: [id number]      HPI: Lukas Foss is a 20 y.o. male who presents to the ED for Other (Pt comes to the ED after nearly getting kicked out of his apartment. Pt states that he just \"wants to talk to a .\" Pt denies SI/HI. )       Worsening depression and anxiety  Started today  Not sure who psychiatrist is    Feels need to be somewhere safe    Taking meds  Denied si hi or auditory or visual hallucinations    Cannot remember when last hospitalized    Denied having       ROS: Negative for cp or sob.    PE: Gen Appearance/Constitutional: alert  HEENT: NC/NT. PERRLA,  Airway patent.  Neck: supple  CV: regular rate  Pulm: CTA bilat  GI: soft and NT  Musculoskeletal: moves all extremities x 4  Lymphatics: no edema     Initial Plan of Care: All treatment areas with department are currently occupied. Plan to order/Initiate the following while awaiting opening in ED: labs.  Initiate Treatment-Testing, Proceed toTreatment Area When Bed Available for ED Attending/MLP to Continue Care    Electronically signed by NAN Pina - CLARICE   DD: 4/21/25

## 2025-05-02 ENCOUNTER — HOSPITAL ENCOUNTER (EMERGENCY)
Age: 20
Discharge: HOME | End: 2025-05-02
Payer: COMMERCIAL

## 2025-05-02 VITALS
SYSTOLIC BLOOD PRESSURE: 127 MMHG | HEART RATE: 88 BPM | RESPIRATION RATE: 16 BRPM | TEMPERATURE: 98.6 F | DIASTOLIC BLOOD PRESSURE: 82 MMHG | OXYGEN SATURATION: 99 %

## 2025-05-02 DIAGNOSIS — M25.561 ACUTE PAIN OF RIGHT KNEE: Primary | ICD-10-CM

## 2025-05-02 PROCEDURE — 99283 EMERGENCY DEPT VISIT LOW MDM: CPT

## 2025-05-02 RX ORDER — IBUPROFEN 400 MG/1
600 TABLET, FILM COATED ORAL ONCE
Status: DISCONTINUED | OUTPATIENT
Start: 2025-05-02 | End: 2025-05-02 | Stop reason: HOSPADM

## 2025-05-02 ASSESSMENT — PAIN - FUNCTIONAL ASSESSMENT: PAIN_FUNCTIONAL_ASSESSMENT: NONE - DENIES PAIN

## 2025-05-02 NOTE — ED PROVIDER NOTES
Independent CHRISTOPHER Visit.     [facility]  Department of Emergency Medicine   ED  Encounter Note  Admit Date/RoomTime: 2025  6:20 PM  ED Room: HUSAM PRECIADO/RODRIGUEZ    NAME: Lukas Foss  : 2005  MRN: [id number]     Chief Complaint:  Knee Pain (Pt was at the gas station and twisted his right knee.)    History of Present Illness       Lukas Foss is a 20 y.o. old male who presents to the emergency department by ambulance, for non-traumatic  sharp pain  to right knee  which occured a few minute(s) prior to arrival.  The complaint is due to no injury was just walking and felt sharp sudden pain which is mostly subsided at this point.  Since onset the symptoms have been rapidly improving.  PT reports torn ACL in the knee, no surgery, wore a brace and had PT, no complications since.  His pain is aggraveated by weight bearing and relieved by rest of injured area. His weight bearing ability is: normal. He denies any wounds, rash, swelling, numbness or tingling.     ROS   Pertinent positives and negatives are stated within HPI, all other systems reviewed and are negative.    Past Medical History:  has a past medical history of ADHD (attention deficit hyperactivity disorder), Autism disorder, Bipolar 1 disorder (HCC), GERD (gastroesophageal reflux disease), Hypotonia, Intermittent explosive disorder, Microcephalic (HCC), and Schizoaffective disorder (HCC).    Surgical History:  has no past surgical history on file.    Social History:  reports that he has quit smoking. His smoking use included cigarettes. He has a 6 pack-year smoking history. He has never used smokeless tobacco. He reports current alcohol use of about 15.0 standard drinks of alcohol per week. He reports that he does not currently use drugs after having used the following drugs: Marijuana (Weed). Frequency: 7.00 times per week.    Family History: family history is not on file.     Allergies: Strawberry    Physical Exam   Oxygen    Consult(s):   None    Procedure(s):   none.    MDM:  Lukas Foss is a 20 y.o. old male who presents to the emergency department by ambulance, for non-traumatic sharp pain to right knee  which occured a few minute(s) prior to arrival.  The complaint is due to no injury was just walking and felt sharp sudden pain which is mostly subsided at this point.  Since onset the symptoms have been rapidly improving.  PT reports torn ACL in the knee, no surgery, wore a brace and had PT, no complications since.  His pain is aggraveated by weight bearing and relieved by rest of injured area. His weight bearing ability is: normal. He denies any wounds, rash, swelling, numbness or tingling.    Imaging was not obtained based on no suspicion for fracture / bony abnormality as per history/physical findings. Plan is subsequently for ace wrap, pt declined knee brace, motrin which pt reports he has at home with outpatient follow-up with pcp as instructed in d/c instructions.    Plan of Care/Counseling:  Gela Paez PA-C reviewed today's visit with the patient in addition to providing specific details for the plan of care and counseling regarding the diagnosis and prognosis.  Questions are answered at this time and are agreeable with the plan.    Assessment      1. Acute pain of right knee      Plan   Discharged home.  Patient condition is good    New Medications     New Prescriptions    No medications on file     Electronically signed by Gela Paez PA-C   DD: 5/2/25  **This report was transcribed using voice recognition software. Every effort was made to ensure accuracy; however, inadvertent computerized transcription errors may be present.  END OF ED PROVIDER NOTE

## 2025-05-03 ENCOUNTER — HOSPITAL ENCOUNTER (EMERGENCY)
Age: 20
Discharge: HOME | End: 2025-05-04
Attending: EMERGENCY MEDICINE
Payer: COMMERCIAL

## 2025-05-03 VITALS
SYSTOLIC BLOOD PRESSURE: 106 MMHG | BODY MASS INDEX: 22.15 KG/M2 | RESPIRATION RATE: 18 BRPM | TEMPERATURE: 98.2 F | HEART RATE: 89 BPM | WEIGHT: 150 LBS | DIASTOLIC BLOOD PRESSURE: 88 MMHG | OXYGEN SATURATION: 98 %

## 2025-05-03 DIAGNOSIS — F32.A DEPRESSION, UNSPECIFIED DEPRESSION TYPE: Primary | ICD-10-CM

## 2025-05-03 PROCEDURE — 99283 EMERGENCY DEPT VISIT LOW MDM: CPT

## 2025-05-04 NOTE — DISCHARGE INSTRUCTIONS
Return to the ER if you have thoughts of hurting yourself or others, hearing voices or seeing things others cannot see or hear, or any other new or concerning symptoms.

## 2025-05-04 NOTE — ED PROVIDER NOTES
appear to be a threat to himself or others.          FINAL IMPRESSION      1. Depression, unspecified depression type          DISPOSITION/PLAN     DISPOSITION Decision To Discharge 05/04/2025 12:46:49 AM    PATIENT REFERRED TO:  [address] [address]  [phone]  Go on 5/5/2025  for assessment at 8:00AM      DISCHARGE MEDICATIONS:  Discharge Medication List as of 5/4/2025 12:50 AM          DISCONTINUED MEDICATIONS:  Discharge Medication List as of 5/4/2025 12:50 AM               (Please note that portions of this note were completed with a voice recognition program.  Efforts were made to edit the dictations but occasionally words are mis-transcribed.)    NAN Zuniga - CNP (electronically signed)

## 2025-05-04 NOTE — PROGRESS NOTES
Cl denied SI/HI and A/V H . He reports that his intention was to have the ambulance transport him to [facility] for meds refil so he could get ride home . Cl was educated on [facility].

## 2025-05-06 ENCOUNTER — HOSPITAL ENCOUNTER (EMERGENCY)
Age: 20
Discharge: PSYCHIATRIC OR SUBSTANCE USE FACILITY | End: 2025-05-07
Attending: STUDENT IN AN ORGANIZED HEALTH CARE EDUCATION/TRAINING PROGRAM
Payer: COMMERCIAL

## 2025-05-06 DIAGNOSIS — R45.851 SUICIDAL IDEATION: Primary | ICD-10-CM

## 2025-05-06 LAB
ALBUMIN SERPL-MCNC: 4.1 G/DL (ref 3.5–5.2)
ALP SERPL-CCNC: 103 U/L (ref 40–129)
ALT SERPL-CCNC: 13 U/L (ref 0–40)
AMPHET UR QL SCN: NEGATIVE
ANION GAP SERPL CALCULATED.3IONS-SCNC: 12 MMOL/L (ref 7–16)
APAP SERPL-MCNC: <5 UG/ML (ref 10–30)
AST SERPL-CCNC: 20 U/L (ref 0–39)
BARBITURATES UR QL SCN: NEGATIVE
BASOPHILS # BLD: 0.03 K/UL (ref 0–0.2)
BASOPHILS NFR BLD: 0 % (ref 0–2)
BENZODIAZ UR QL: NEGATIVE
BILIRUB SERPL-MCNC: 0.3 MG/DL (ref 0–1.2)
BUN SERPL-MCNC: 13 MG/DL (ref 6–20)
BUPRENORPHINE UR QL: NEGATIVE
CALCIUM SERPL-MCNC: 8.9 MG/DL (ref 8.6–10.2)
CANNABINOIDS UR QL SCN: POSITIVE
CHLORIDE SERPL-SCNC: 103 MMOL/L (ref 98–107)
CO2 SERPL-SCNC: 22 MMOL/L (ref 22–29)
COCAINE UR QL SCN: NEGATIVE
CREAT SERPL-MCNC: 0.9 MG/DL (ref 0.7–1.2)
DATE LAST DOSE: ABNORMAL
EOSINOPHIL # BLD: 0.55 K/UL (ref 0.05–0.5)
EOSINOPHILS RELATIVE PERCENT: 8 % (ref 0–6)
ERYTHROCYTE [DISTWIDTH] IN BLOOD BY AUTOMATED COUNT: 12.9 % (ref 11.5–15)
ETHANOLAMINE SERPL-MCNC: <10 MG/DL (ref 0–0.08)
FENTANYL UR QL: NEGATIVE
GFR, ESTIMATED: >90 ML/MIN/1.73M2
GLUCOSE SERPL-MCNC: 96 MG/DL (ref 74–99)
HCT VFR BLD AUTO: 46 % (ref 37–54)
HGB BLD-MCNC: 15.3 G/DL (ref 12.5–16.5)
IMM GRANULOCYTES # BLD AUTO: 0.03 K/UL (ref 0–0.58)
IMM GRANULOCYTES NFR BLD: 0 % (ref 0–5)
LYMPHOCYTES NFR BLD: 1.64 K/UL (ref 1.5–4)
LYMPHOCYTES RELATIVE PERCENT: 24 % (ref 20–42)
MCH RBC QN AUTO: 29.4 PG (ref 26–35)
MCHC RBC AUTO-ENTMCNC: 33.3 G/DL (ref 32–34.5)
MCV RBC AUTO: 88.5 FL (ref 80–99.9)
METHADONE UR QL: NEGATIVE
MONOCYTES NFR BLD: 0.73 K/UL (ref 0.1–0.95)
MONOCYTES NFR BLD: 11 % (ref 2–12)
NEUTROPHILS NFR BLD: 57 % (ref 43–80)
NEUTS SEG NFR BLD: 3.97 K/UL (ref 1.8–7.3)
OPIATES UR QL SCN: NEGATIVE
OXYCODONE UR QL SCN: NEGATIVE
PCP UR QL SCN: NEGATIVE
PLATELET # BLD AUTO: 202 K/UL (ref 130–450)
PMV BLD AUTO: 8.7 FL (ref 7–12)
POTASSIUM SERPL-SCNC: 4 MMOL/L (ref 3.5–5)
PROT SERPL-MCNC: 6.4 G/DL (ref 6.4–8.3)
RBC # BLD AUTO: 5.2 M/UL (ref 3.8–5.8)
SODIUM SERPL-SCNC: 137 MMOL/L (ref 132–146)
TEST INFORMATION: ABNORMAL
TME LAST DOSE: ABNORMAL H
VALPROATE SERPL-MCNC: <3 UG/ML (ref 50–100)
VANCOMYCIN DOSE: ABNORMAL MG
WBC OTHER # BLD: 7 K/UL (ref 4.5–11.5)

## 2025-05-06 PROCEDURE — G0480 DRUG TEST DEF 1-7 CLASSES: HCPCS

## 2025-05-06 PROCEDURE — 93005 ELECTROCARDIOGRAM TRACING: CPT | Performed by: STUDENT IN AN ORGANIZED HEALTH CARE EDUCATION/TRAINING PROGRAM

## 2025-05-06 PROCEDURE — 80053 COMPREHEN METABOLIC PANEL: CPT

## 2025-05-06 PROCEDURE — 85025 COMPLETE CBC W/AUTO DIFF WBC: CPT

## 2025-05-06 PROCEDURE — 80307 DRUG TEST PRSMV CHEM ANLYZR: CPT

## 2025-05-06 PROCEDURE — 99285 EMERGENCY DEPT VISIT HI MDM: CPT

## 2025-05-06 PROCEDURE — 80164 ASSAY DIPROPYLACETIC ACD TOT: CPT

## 2025-05-06 PROCEDURE — 80143 DRUG ASSAY ACETAMINOPHEN: CPT

## 2025-05-06 ASSESSMENT — LIFESTYLE VARIABLES
HOW MANY STANDARD DRINKS CONTAINING ALCOHOL DO YOU HAVE ON A TYPICAL DAY: 1 OR 2
HOW OFTEN DO YOU HAVE A DRINK CONTAINING ALCOHOL: 2-3 TIMES A WEEK

## 2025-05-06 ASSESSMENT — PAIN - FUNCTIONAL ASSESSMENT: PAIN_FUNCTIONAL_ASSESSMENT: NONE - DENIES PAIN

## 2025-05-07 VITALS
BODY MASS INDEX: 22.22 KG/M2 | HEART RATE: 64 BPM | OXYGEN SATURATION: 98 % | SYSTOLIC BLOOD PRESSURE: 101 MMHG | RESPIRATION RATE: 18 BRPM | WEIGHT: 150 LBS | DIASTOLIC BLOOD PRESSURE: 64 MMHG | HEIGHT: 69 IN | TEMPERATURE: 98.1 F

## 2025-05-07 NOTE — ED NOTES
Safe EnvironmentArm Bands On: ID; AllergiesPatient has limb restriction?: NoSafety Measures: Standard Safety Measures; Bed/Chair alarm on; Bed/Chair-Wheels locked; Bed in low position; Side rails X 2; Sitter at bedside; Gripper socks  Fall Risk InterventionsToilet Every 2 Hours-In Advance of Need: YesHourly Visual Checks: Awake; In bedRoom Door Open: No (Comment) (in church bed)Patient Moved Closer to Nursing Station: NoFall Visual Posted: SocksNursing Judgement-Fall Risk High(Add Comments): No  HygieneHygiene: Reny careLevel of Assistance: Independent  PrecautionsPrecautions: Suicide; ElopementNegative Pressure Room: NoPositive Pressure Room: No  Comfort and Environment InterventionsComfort: Warm blanketWarming [place]: Applied  Safety CompanionSitter: ContinuedSitter Type: StaffIndications for Sitter: Suicidal riskAlternatives to Sitter: Comfort Measures; Eliminate Invasive Treatment; Increased Frequency of Nursing Rounds; Decrease Stimulation  Restart TimerAutomatic Restart Rounding Timer: Yes  NutritionNPO: NoFeeding: Able to feed self - Independent  TelemetryCardiac/Telemetry Monitor On: No  MobilityActivity: In bed; To bathroomAmbulation Response: Tolerated wellLevel of Assistance: Independent (accompanied by sitter)Head of Bed Elevated : Self regulatedTurned/Repositioned: Turns self

## 2025-05-07 NOTE — ED NOTES
Constant observation ordered upon arrival; pt changed into blue paper gown and all belongings collect and stored away from patient. Ligature risks mitigated and all other appropriate suicide precautions taken.

## 2025-05-07 NOTE — ED NOTES
Safe EnvironmentArm Bands On: ID; AllergiesPatient has limb restriction?: NoSafety Measures: Standard Safety Measures; Bed/Chair alarm on; Bed/Chair-Wheels locked; Bed in low position; Side rails X 2; Sitter at bedside; Gripper socks  Fall Risk InterventionsToilet Every 2 Hours-In Advance of Need: YesHourly Visual Checks: Awake; In bedRoom Door Open: No (Comment) (in church bed)Patient Moved Closer to Nursing Station: NoFall Visual Posted: SocksNursing Judgement-Fall Risk High(Add Comments): No  HygieneHygiene: Reny careLevel of Assistance: Independent  PrecautionsPrecautions: Suicide; ElopementNegative Pressure Room: NoPositive Pressure Room: No  Comfort and Environment InterventionsComfort: Warm blanketWarming [place]: Applied  Safety CompanionSitter: ContinuedSitter Type: StaffIndications for Sitter: Suicidal riskAlternatives to Sitter: Comfort Measures; Eliminate Invasive Treatment; Increased Frequency of Nursing Rounds; Decrease Stimulation  Restart TimerAutomatic Restart Rounding Timer: Yes  NutritionNPO: NoFeeding: Able to feed self - Independent  TelemetryCardiac/Telemetry Monitor On: No  MobilityActivity: In bed; To bathroomAmbulation Response: Tolerated wellLevel of Assistance: Independent (accompanied by sitter)Head of Bed Elevated : Self regulatedTurned/Repositioned: Turns self

## 2025-05-07 NOTE — PROGRESS NOTES
05/07/25  Lukas Foss  [id number]  2005    Social Work Behavioral Health Crisis Assessment    Chief Complaint: SI with plan     Mental Status Exam:  Level of consciousness:  arousable to wake up   Appearance:  hospital attire.  Does appear stated age. No acute distress.  Behavior/Motor:  sleepy   Attitude toward examiner:  cooperative  SI/HI: SI with plan to jump in traffic   Speech:  normal rate , Tone: normal tone  Mood: depressed  Affect: flat  Thought Processes:  slow.   Thought Content: Suicidal Ideation:  active  Hallucinations:  Hallucinations: Denies AVOT-H  Cognition:  oriented to person, place, and time   Concentration: poor  Memory: intact, though not formally tested.  Insight: poor   Judgement: fair   Fund of Knowledge: adequate    Legal Status:  [] Voluntary:  [x] Involuntary, Issued by: [facility] worker  [] Probate    Brief Clinical Summary: Patient is a 20 y.o. White (non-) male who presents for SI with plan. Patient presented to the ED via EMS on 05/07/25 from Home.    Collateral Information:    Risk Factors: Non-compliant with medications     Protective Factors: Help Seeking    Legal Issues:  [x]  Yes (Specify) hx of charges, nothing currently   []  No    Access to Weapons:  []  Yes (Specify)  [x]  No    Violence Risk Screening:        Have you ever thought about hurting someone?   [x]  No  []  Yes (Ask the questions listed below)   When?    Did you follow through with the thoughts?      [x] No     [] Yes- When and what happened?  2.  Have you ever threatened anyone?  [x]  No  []  Yes (Ask the questions listed below)   When and what happened?    Have you ever threatened someone with a gun, knife or other weapon?   [x]  No  []  Yes - When and what happened?  2. Have you ever had an order of protection taken out against you? []  Yes [x]  No  3. Have you ever been arrested due to violence? []  Yes [x]  No  4. Have you ever been cruel to animals? []  Yes [x]  No    C-SSRS Screening Completed

## 2025-05-07 NOTE — ED NOTES
Safe EnvironmentArm Bands On: ID; AllergiesPatient has limb restriction?: NoSafety Measures: Standard Safety Measures; Bed/Chair alarm on; Bed/Chair-Wheels locked; Bed in low position; Side rails X 2; Sitter at bedside; Gripper socks  Fall Risk InterventionsToilet Every 2 Hours-In Advance of Need: YesHourly Visual Checks: Awake; In bedRoom Door Open: No (Comment) (in church bed)Patient Moved Closer to Nursing Station: NoFall Visual Posted: SocksNursing Judgement-Fall Risk High(Add Comments): No  HygieneHygiene: Reny careLevel of Assistance: Independent  PrecautionsPrecautions: Suicide; ElopementNegative Pressure Room: NoPositive Pressure Room: No  Comfort and Environment InterventionsComfort: Warm blanketWarming [place]: Applied  Safety CompanionSitter: ContinuedSitter Type: StaffIndications for Sitter: Suicidal riskAlternatives to Sitter: Comfort Measures; Eliminate Invasive Treatment; Increased Frequency of Nursing Rounds; Decrease Stimulation  Restart TimerAutomatic Restart Rounding Timer: Yes  NutritionNPO: NoFeeding: Able to feed self - Independent  TelemetryCardiac/Telemetry Monitor On: No  MobilityActivity: In bed; To bathroomAmbulation Response: Tolerated wellLevel of Assistance: Independent (accompanied by sitter)Head of Bed Elevated : Self regulatedTurned/Repositioned: Turns self

## 2025-05-07 NOTE — ED NOTES
Safe EnvironmentArm Bands On: ID; AllergiesPatient has limb restriction?: NoSafety Measures: Standard Safety Measures; Bed/Chair alarm on; Bed/Chair-Wheels locked; Bed in low position; Side rails X 2; Sitter at bedside; Gripper socks  Fall Risk InterventionsToilet Every 2 Hours-In Advance of Need: YesHourly Visual Checks: Awake; In bedRoom Door Open: No (Comment) (in church bed)Patient Moved Closer to Nursing Station: NoFall Visual Posted: SocksNursing Judgement-Fall Risk High(Add Comments): No  HygieneHygiene: Reny careLevel of Assistance: Independent  PrecautionsPrecautions: Suicide; ElopementNegative Pressure Room: NoPositive Pressure Room: No  Comfort and Environment InterventionsComfort: Warm blanketWarming [place]: Applied  Safety CompanionSitter: ContinuedSitter Type: StaffIndications for Sitter: Suicidal riskAlternatives to Sitter: Comfort Measures; Eliminate Invasive Treatment; Increased Frequency of Nursing Rounds; Decrease Stimulation  Restart TimerAutomatic Restart Rounding Timer: Yes  NutritionNPO: NoFeeding: Able to feed self - Independent  TelemetryCardiac/Telemetry Monitor On: No  MobilityActivity: In bed; To bathroomAmbulation Response: Tolerated wellLevel of Assistance: Independent (accompanied by sitter)Head of Bed Elevated : Self regulatedTurned/Repositioned: Turns self

## 2025-05-07 NOTE — ED NOTES
Patient educated on suicide precautions, need for a constant observer and rights as a patient; he expressed understanding.

## 2025-05-07 NOTE — ED NOTES
PrecautionsPrecautions: SuicideNegative Pressure Room: NoPositive Pressure Room: No  Safe EnvironmentArm Bands On: ID; AllergiesPatient has limb restriction?: NoSafety Measures: Standard Safety Measures; Bed/Chair-Wheels locked; Bed in low position; Gripper socks; Side rails X 2; Caregiver at bedside  TelemetryCardiac/Telemetry Monitor On: No  Family/Significant Other CommunicationFamily/Significant Other Update: Updated; Visiting  Fall Risk InterventionsNursing Judgement-Fall Risk High(Add Comments): NoToilet Every 2 Hours-In Advance of Need: YesHourly Visual Checks: In bed; Eyes closed; QuietFall Visual Posted: SocksRoom Door Open: No (Comment) (IN [facility] BED)Alarm On: BedPatient Moved Closer to Nursing Station: No  MobilityActivity: In bed; To bathroomLevel of Assistance: IndependentAmbulation Response: Tolerated wellTurned/Repositioned: Turns selfHead of Bed Elevated : Self regulated  NutritionFeeding: Able to feed self - IndependentNPO: No  HygieneHygiene: Reny careLevel of Assistance: Independent  Safety CompanionSitter: ContinuedSitter Type: StaffIndications for Sitter: Suicidal risk; Patient safetyAlternatives to Sitter: Comfort Measures; Decrease Stimulation; Eliminate Invasive Treatment; Increased Frequency of Nursing Rounds

## 2025-05-07 NOTE — ED NOTES
Safe EnvironmentArm Bands On: ID; AllergiesPatient has limb restriction?: NoSafety Measures: Standard Safety Measures; Bed/Chair alarm on; Bed/Chair-Wheels locked; Bed in low position; Side rails X 2; Sitter at bedside; Gripper socks  Fall Risk InterventionsToilet Every 2 Hours-In Advance of Need: YesHourly Visual Checks: Awake; In bedRoom Door Open: No (Comment) (in church bed)Patient Moved Closer to Nursing Station: NoFall Visual Posted: SocksNursing Judgement-Fall Risk High(Add Comments): No  HygieneHygiene: Reny careLevel of Assistance: Independent  PrecautionsPrecautions: Suicide; ElopementNegative Pressure Room: NoPositive Pressure Room: No  Comfort and Environment InterventionsComfort: Warm blanketWarming [place]: Applied  Safety CompanionSitter: ContinuedSitter Type: StaffIndications for Sitter: Suicidal riskAlternatives to Sitter: Comfort Measures; Eliminate Invasive Treatment; Increased Frequency of Nursing Rounds; Decrease Stimulation  Restart TimerAutomatic Restart Rounding Timer: Yes  NutritionNPO: NoFeeding: Able to feed self - Independent  TelemetryCardiac/Telemetry Monitor On: No  MobilityActivity: In bed; To bathroomAmbulation Response: Tolerated wellLevel of Assistance: Independent (accompanied by sitter)Head of Bed Elevated : Self regulatedTurned/Repositioned: Turns self

## 2025-05-07 NOTE — ED PROVIDER NOTES
7.0 - 12.0 fL    Neutrophils % 57 43.0 - 80.0 %    Lymphocytes % 24 20.0 - 42.0 %    Monocytes % 11 2.0 - 12.0 %    Eosinophils % 8 (H) 0 - 6 %    Basophils % 0 0.0 - 2.0 %    Immature Granulocytes % 0 0.0 - 5.0 %    Neutrophils Absolute 3.97 1.80 - 7.30 k/uL    Lymphocytes Absolute 1.64 1.50 - 4.00 k/uL    Monocytes Absolute 0.73 0.10 - 0.95 k/uL    Eosinophils Absolute 0.55 (H) 0.05 - 0.50 k/uL    Basophils Absolute 0.03 0.00 - 0.20 k/uL    Immature Granulocytes Absolute 0.03 0.00 - 0.58 k/uL   Comprehensive Metabolic Panel w/ Reflex to MG   Result Value Ref Range    Sodium 137 132 - 146 mmol/L    Potassium 4.0 3.5 - 5.0 mmol/L    Chloride 103 98 - 107 mmol/L    CO2 22 22 - 29 mmol/L    Anion Gap 12 7 - 16 mmol/L    Glucose 96 74 - 99 mg/dL    BUN 13 6 - 20 mg/dL    Creatinine 0.9 0.70 - 1.20 mg/dL    Est, Glom Filt Rate >90 >60 mL/min/1.73m2    Calcium 8.9 8.6 - 10.2 mg/dL    Total Protein 6.4 6.4 - 8.3 g/dL    Albumin 4.1 3.5 - 5.2 g/dL    Total Bilirubin 0.3 0.0 - 1.2 mg/dL    Alkaline Phosphatase 103 40 - 129 U/L    ALT 13 0 - 40 U/L    AST 20 0 - 39 U/L   Ethanol   Result Value Ref Range    Ethanol Lvl <10 <10 mg/dL   URINE DRUG SCREEN   Result Value Ref Range    Amphetamine Screen, Ur NEGATIVE NEGATIVE    Barbiturate Screen, Ur NEGATIVE NEGATIVE    Benzodiazepine Screen, Urine NEGATIVE NEGATIVE    Cocaine Metabolite, Urine NEGATIVE NEGATIVE    Methadone Screen, Urine NEGATIVE NEGATIVE    Opiates, Urine NEGATIVE NEGATIVE    Phencyclidine, Urine NEGATIVE NEGATIVE    Cannabinoid Scrn, Ur POSITIVE (A) NEGATIVE    Oxycodone Screen, Ur NEGATIVE NEGATIVE    Fentanyl, Ur NEGATIVE NEGATIVE    Buprenorphine Urine NEGATIVE NEGATIVE    Test Information       These drug screen results are for medical purposes only and should not be considered definitive or confirmed.   Acetaminophen Level   Result Value Ref Range    Acetaminophen Level <5 (L) 10.0 - 30.0 ug/mL   Valproic Acid Level, Total   Result Value Ref Range

## 2025-05-07 NOTE — PROGRESS NOTES
CB screened Cl and will refer to [facility] per his request.  This screener also pink slipped Cl for SI with plan.

## 2025-05-08 LAB
EKG ATRIAL RATE: 69 BPM
EKG P AXIS: 59 DEGREES
EKG P-R INTERVAL: 148 MS
EKG Q-T INTERVAL: 376 MS
EKG QRS DURATION: 90 MS
EKG QTC CALCULATION (BAZETT): 402 MS
EKG R AXIS: 51 DEGREES
EKG T AXIS: 47 DEGREES
EKG VENTRICULAR RATE: 69 BPM

## 2025-05-08 PROCEDURE — 93010 ELECTROCARDIOGRAM REPORT: CPT | Performed by: INTERNAL MEDICINE

## 2025-05-23 ENCOUNTER — HOSPITAL ENCOUNTER (EMERGENCY)
Dept: HOSPITAL 83 - ED | Age: 20
LOS: 1 days | Discharge: HOME | End: 2025-05-24
Payer: COMMERCIAL

## 2025-05-23 DIAGNOSIS — F32.A: Primary | ICD-10-CM

## 2025-05-23 DIAGNOSIS — Z59.9: ICD-10-CM

## 2025-05-23 SDOH — ECONOMIC STABILITY - INCOME SECURITY: PROBLEM RELATED TO HOUSING AND ECONOMIC CIRCUMSTANCES, UNSPECIFIED: Z59.9

## 2025-05-25 ENCOUNTER — HOSPITAL ENCOUNTER (EMERGENCY)
Age: 20
Discharge: LEFT BEFORE DISCHARGE | End: 2025-05-25
Attending: EMERGENCY MEDICINE
Payer: COMMERCIAL

## 2025-05-25 VITALS
RESPIRATION RATE: 16 BRPM | SYSTOLIC BLOOD PRESSURE: 114 MMHG | WEIGHT: 170 LBS | OXYGEN SATURATION: 97 % | BODY MASS INDEX: 25.18 KG/M2 | TEMPERATURE: 97.5 F | HEART RATE: 80 BPM | DIASTOLIC BLOOD PRESSURE: 73 MMHG | HEIGHT: 69 IN

## 2025-05-25 DIAGNOSIS — F41.9 ANXIETY: Primary | ICD-10-CM

## 2025-05-25 PROCEDURE — 99283 EMERGENCY DEPT VISIT LOW MDM: CPT

## 2025-05-25 PROCEDURE — 93005 ELECTROCARDIOGRAM TRACING: CPT | Performed by: EMERGENCY MEDICINE

## 2025-05-25 ASSESSMENT — PAIN - FUNCTIONAL ASSESSMENT: PAIN_FUNCTIONAL_ASSESSMENT: NONE - DENIES PAIN

## 2025-05-25 NOTE — ED PROVIDER NOTES
[facility] EMERGENCY DEPARTMENT  EMERGENCY DEPARTMENT ENCOUNTER        Pt Name: Lukas Foss  MRN: [id number]  Birthdate 2005  Date of evaluation: 5/25/2025  Provider: Ruby Chandra DO  PCP: No primary care provider on file.  Note Started: 7:36 PM EDT 5/25/25    CHIEF COMPLAINT       Chief Complaint   Patient presents with    Anxiety     States wants to talk to someone about anxiety, States about to lose his apartment because cannot afford it and has no options       HISTORY OF PRESENT ILLNESS: 1 or more Elements   History From: patient and EMS    Limitations to history : None    Lukas Foss is a 20 y.o. male who presents with request for refill of his anxiety medications a ride to [place].  Patient called EMS from his apartment stating he was anxious and just needed somebody to talk to and requested they bring him to [place] emergency department.  He told them that he was just seen here 3 days ago for the same thing and needs to come back for that.  Per review of medical record patient was transferred from [facility] to psychiatric facility for suicidal ideation.  Upon arrival here he states he is a bit anxious because he is out of his anxiety medication and he is going to lose his apartment soon and has nowhere to live but he can stay with his friend in [place] and is supposed to start a new job in [place] on Tuesday and asking if you can give him a cab voucher or pay for an Uber for him to get to [facility] now.  He reports he has not been on his anxiety medication because when he was discharged from the psych facility they [facility] the [facility] in [place] [facility] of the [facility] in [place] and he could not go pick it up and [facility].  He is unsure what the medication is but thinks it is Risperdal.  Per review of his current medication list he gets injectable Risperdal and not oral.  It appears he does take Depakote,

## 2025-05-25 NOTE — ED NOTES
Called  at [facility] [phone] per section G  will not be in until 1500. Patient information provided to section G. Dr. Chandra notified.

## 2025-05-27 LAB
EKG ATRIAL RATE: 65 BPM
EKG P AXIS: 36 DEGREES
EKG P-R INTERVAL: 142 MS
EKG Q-T INTERVAL: 366 MS
EKG QRS DURATION: 80 MS
EKG QTC CALCULATION (BAZETT): 380 MS
EKG R AXIS: 31 DEGREES
EKG T AXIS: 31 DEGREES
EKG VENTRICULAR RATE: 65 BPM

## 2025-05-27 PROCEDURE — 93010 ELECTROCARDIOGRAM REPORT: CPT | Performed by: INTERNAL MEDICINE

## 2025-06-02 ENCOUNTER — APPOINTMENT (OUTPATIENT)
Dept: GENERAL RADIOLOGY | Age: 20
End: 2025-06-02
Payer: COMMERCIAL

## 2025-06-02 ENCOUNTER — HOSPITAL ENCOUNTER (EMERGENCY)
Age: 20
Discharge: HOME | End: 2025-06-02
Attending: EMERGENCY MEDICINE
Payer: COMMERCIAL

## 2025-06-02 VITALS
SYSTOLIC BLOOD PRESSURE: 121 MMHG | RESPIRATION RATE: 16 BRPM | HEART RATE: 83 BPM | OXYGEN SATURATION: 99 % | TEMPERATURE: 97.9 F | DIASTOLIC BLOOD PRESSURE: 77 MMHG

## 2025-06-02 DIAGNOSIS — K56.7 ILEUS (HCC): Primary | ICD-10-CM

## 2025-06-02 PROCEDURE — 74018 RADEX ABDOMEN 1 VIEW: CPT

## 2025-06-02 PROCEDURE — 6370000000 HC RX 637 (ALT 250 FOR IP): Performed by: EMERGENCY MEDICINE

## 2025-06-02 PROCEDURE — 99283 EMERGENCY DEPT VISIT LOW MDM: CPT

## 2025-06-02 RX ORDER — ACETAMINOPHEN 500 MG
1000 TABLET ORAL ONCE
Status: COMPLETED | OUTPATIENT
Start: 2025-06-02 | End: 2025-06-02

## 2025-06-02 RX ORDER — DICYCLOMINE HYDROCHLORIDE 10 MG/1
10 CAPSULE ORAL
Qty: 20 CAPSULE | Refills: 0 | Status: SHIPPED | OUTPATIENT
Start: 2025-06-02

## 2025-06-02 RX ADMIN — ACETAMINOPHEN 1000 MG: 500 TABLET ORAL at 10:28

## 2025-06-02 ASSESSMENT — PAIN - FUNCTIONAL ASSESSMENT
PAIN_FUNCTIONAL_ASSESSMENT: NONE - DENIES PAIN
PAIN_FUNCTIONAL_ASSESSMENT: 0-10

## 2025-06-02 ASSESSMENT — PAIN SCALES - GENERAL: PAINLEVEL_OUTOF10: 2

## 2025-06-02 NOTE — ED PROVIDER NOTES
HPI:  6/2/25,   Time: 10:45 AM EDT         Lukas Foss is a 20 y.o. male presenting to the ED for chief complaint: Patient stated he has known onset abdominal cramping and left work and came to our facility to be checked and get a work excuse, beginning 1 hour ago.  He denies chest pain shortness of breath palpitations nausea vomiting or diarrhea.      ROS:   Pertinent positives and negatives are stated within HPI, all other systems reviewed and are negative.  --------------------------------------------- PAST HISTORY ---------------------------------------------  Past Medical History:  has a past medical history of ADHD (attention deficit hyperactivity disorder), Autism disorder, Bipolar 1 disorder (HCC), GERD (gastroesophageal reflux disease), Hypotonia, Intermittent explosive disorder, Microcephalic ([facility]), and Schizoaffective disorder ([facility]).    Past Surgical History:  has no past surgical history on file.    Social History:  reports that he has quit smoking. His smoking use included cigarettes. He has a 6 pack-year smoking history. He has never used smokeless tobacco. He reports current alcohol use of about 15.0 standard drinks of alcohol per week. He reports that he does not currently use drugs after having used the following drugs: Marijuana (Weed). Frequency: 7.00 times per week.    Family History: family history is not on file.     The patient’s home medications have been reviewed.    Allergies: Strawberry    -------------------------------------------------- RESULTS -------------------------------------------------  All laboratory and radiology results have been personally reviewed by myself   LABS:  No results found for this visit on 06/02/25.    RADIOLOGY:  Interpreted by Radiologist.  XR ABDOMEN (KUB) (SINGLE AP VIEW)   Final Result   1.  Mild small bowel distension, nonspecific.  Please correlate clinically.   Possible ileus.      2.  Moderate colonic stool content.         XR ABDOMEN (KUB)